# Patient Record
Sex: FEMALE | Race: WHITE | NOT HISPANIC OR LATINO | Employment: OTHER | URBAN - NONMETROPOLITAN AREA
[De-identification: names, ages, dates, MRNs, and addresses within clinical notes are randomized per-mention and may not be internally consistent; named-entity substitution may affect disease eponyms.]

---

## 2021-11-02 ENCOUNTER — APPOINTMENT (OUTPATIENT)
Dept: CT IMAGING | Facility: HOSPITAL | Age: 81
End: 2021-11-02

## 2021-11-02 ENCOUNTER — APPOINTMENT (OUTPATIENT)
Dept: GENERAL RADIOLOGY | Facility: HOSPITAL | Age: 81
End: 2021-11-02

## 2021-11-02 ENCOUNTER — HOSPITAL ENCOUNTER (INPATIENT)
Facility: HOSPITAL | Age: 81
LOS: 5 days | Discharge: HOME HEALTH | End: 2021-11-07
Attending: EMERGENCY MEDICINE | Admitting: INTERNAL MEDICINE

## 2021-11-02 DIAGNOSIS — Z74.09 IMPAIRED MOBILITY AND ADLS: ICD-10-CM

## 2021-11-02 DIAGNOSIS — Z74.09 IMPAIRED FUNCTIONAL MOBILITY, BALANCE, GAIT, AND ENDURANCE: ICD-10-CM

## 2021-11-02 DIAGNOSIS — Z78.9 IMPAIRED MOBILITY AND ADLS: ICD-10-CM

## 2021-11-02 DIAGNOSIS — S72.002A DISPLACED FRACTURE OF LEFT FEMORAL NECK (HCC): Primary | ICD-10-CM

## 2021-11-02 DIAGNOSIS — S72.042D CLOSED DISPLACED BASICERVICAL FRACTURE OF LEFT FEMUR WITH ROUTINE HEALING, SUBSEQUENT ENCOUNTER: ICD-10-CM

## 2021-11-02 DIAGNOSIS — S72.042A CLOSED DISPLACED BASICERVICAL FRACTURE OF LEFT FEMUR, INITIAL ENCOUNTER (HCC): ICD-10-CM

## 2021-11-02 PROBLEM — I10 PRIMARY HYPERTENSION: Status: ACTIVE | Noted: 2021-11-02

## 2021-11-02 LAB
ABO GROUP BLD: NORMAL
ALBUMIN SERPL-MCNC: 4.3 G/DL (ref 3.5–5.2)
ALBUMIN/GLOB SERPL: 1.3 G/DL
ALP SERPL-CCNC: 40 U/L (ref 39–117)
ALT SERPL W P-5'-P-CCNC: 17 U/L (ref 1–33)
ANION GAP SERPL CALCULATED.3IONS-SCNC: 10 MMOL/L (ref 5–15)
AST SERPL-CCNC: 35 U/L (ref 1–32)
BASOPHILS # BLD AUTO: 0.02 10*3/MM3 (ref 0–0.2)
BASOPHILS NFR BLD AUTO: 0.2 % (ref 0–1.5)
BILIRUB SERPL-MCNC: 0.6 MG/DL (ref 0–1.2)
BLD GP AB SCN SERPL QL: NEGATIVE
BUN SERPL-MCNC: 13 MG/DL (ref 8–23)
BUN/CREAT SERPL: 16.7 (ref 7–25)
CALCIUM SPEC-SCNC: 9.4 MG/DL (ref 8.6–10.5)
CHLORIDE SERPL-SCNC: 103 MMOL/L (ref 98–107)
CO2 SERPL-SCNC: 25 MMOL/L (ref 22–29)
CREAT SERPL-MCNC: 0.78 MG/DL (ref 0.57–1)
DEPRECATED RDW RBC AUTO: 43.8 FL (ref 37–54)
EOSINOPHIL # BLD AUTO: 0.02 10*3/MM3 (ref 0–0.4)
EOSINOPHIL NFR BLD AUTO: 0.2 % (ref 0.3–6.2)
ERYTHROCYTE [DISTWIDTH] IN BLOOD BY AUTOMATED COUNT: 13.3 % (ref 12.3–15.4)
FLUAV SUBTYP SPEC NAA+PROBE: NOT DETECTED
FLUBV RNA ISLT QL NAA+PROBE: NOT DETECTED
GFR SERPL CREATININE-BSD FRML MDRD: 71 ML/MIN/1.73
GLOBULIN UR ELPH-MCNC: 3.2 GM/DL
GLUCOSE SERPL-MCNC: 122 MG/DL (ref 65–99)
HCT VFR BLD AUTO: 39.1 % (ref 34–46.6)
HGB BLD-MCNC: 13 G/DL (ref 12–15.9)
HOLD SPECIMEN: NORMAL
IMM GRANULOCYTES # BLD AUTO: 0.06 10*3/MM3 (ref 0–0.05)
IMM GRANULOCYTES NFR BLD AUTO: 0.7 % (ref 0–0.5)
LYMPHOCYTES # BLD AUTO: 0.89 10*3/MM3 (ref 0.7–3.1)
LYMPHOCYTES NFR BLD AUTO: 10.2 % (ref 19.6–45.3)
Lab: NORMAL
MCH RBC QN AUTO: 29.9 PG (ref 26.6–33)
MCHC RBC AUTO-ENTMCNC: 33.2 G/DL (ref 31.5–35.7)
MCV RBC AUTO: 89.9 FL (ref 79–97)
MONOCYTES # BLD AUTO: 0.41 10*3/MM3 (ref 0.1–0.9)
MONOCYTES NFR BLD AUTO: 4.7 % (ref 5–12)
NEUTROPHILS NFR BLD AUTO: 7.31 10*3/MM3 (ref 1.7–7)
NEUTROPHILS NFR BLD AUTO: 84 % (ref 42.7–76)
NRBC BLD AUTO-RTO: 0 /100 WBC (ref 0–0.2)
PLATELET # BLD AUTO: 158 10*3/MM3 (ref 140–450)
PMV BLD AUTO: 9.6 FL (ref 6–12)
POTASSIUM SERPL-SCNC: 3.5 MMOL/L (ref 3.5–5.2)
PROT SERPL-MCNC: 7.5 G/DL (ref 6–8.5)
QT INTERVAL: 410 MS
QTC INTERVAL: 526 MS
RBC # BLD AUTO: 4.35 10*6/MM3 (ref 3.77–5.28)
RH BLD: POSITIVE
SARS-COV-2 RNA PNL SPEC NAA+PROBE: NOT DETECTED
SODIUM SERPL-SCNC: 138 MMOL/L (ref 136–145)
T&S EXPIRATION DATE: NORMAL
WBC # BLD AUTO: 8.71 10*3/MM3 (ref 3.4–10.8)
WHOLE BLOOD HOLD SPECIMEN: NORMAL

## 2021-11-02 PROCEDURE — 25010000002 MORPHINE PER 10 MG: Performed by: NURSE PRACTITIONER

## 2021-11-02 PROCEDURE — G0378 HOSPITAL OBSERVATION PER HR: HCPCS

## 2021-11-02 PROCEDURE — 73502 X-RAY EXAM HIP UNI 2-3 VIEWS: CPT

## 2021-11-02 PROCEDURE — 86900 BLOOD TYPING SEROLOGIC ABO: CPT | Performed by: ORTHOPAEDIC SURGERY

## 2021-11-02 PROCEDURE — 51702 INSERT TEMP BLADDER CATH: CPT

## 2021-11-02 PROCEDURE — 86901 BLOOD TYPING SEROLOGIC RH(D): CPT | Performed by: ORTHOPAEDIC SURGERY

## 2021-11-02 PROCEDURE — 85025 COMPLETE CBC W/AUTO DIFF WBC: CPT | Performed by: PHYSICIAN ASSISTANT

## 2021-11-02 PROCEDURE — 71045 X-RAY EXAM CHEST 1 VIEW: CPT

## 2021-11-02 PROCEDURE — 93005 ELECTROCARDIOGRAM TRACING: CPT | Performed by: PHYSICIAN ASSISTANT

## 2021-11-02 PROCEDURE — 86850 RBC ANTIBODY SCREEN: CPT | Performed by: ORTHOPAEDIC SURGERY

## 2021-11-02 PROCEDURE — 87636 SARSCOV2 & INF A&B AMP PRB: CPT | Performed by: PHYSICIAN ASSISTANT

## 2021-11-02 PROCEDURE — 99284 EMERGENCY DEPT VISIT MOD MDM: CPT

## 2021-11-02 PROCEDURE — 86900 BLOOD TYPING SEROLOGIC ABO: CPT

## 2021-11-02 PROCEDURE — 93010 ELECTROCARDIOGRAM REPORT: CPT | Performed by: INTERNAL MEDICINE

## 2021-11-02 PROCEDURE — 80053 COMPREHEN METABOLIC PANEL: CPT | Performed by: PHYSICIAN ASSISTANT

## 2021-11-02 PROCEDURE — 73700 CT LOWER EXTREMITY W/O DYE: CPT

## 2021-11-02 PROCEDURE — 86901 BLOOD TYPING SEROLOGIC RH(D): CPT

## 2021-11-02 RX ORDER — SODIUM CHLORIDE 0.9 % (FLUSH) 0.9 %
10 SYRINGE (ML) INJECTION AS NEEDED
Status: DISCONTINUED | OUTPATIENT
Start: 2021-11-02 | End: 2021-11-07 | Stop reason: HOSPADM

## 2021-11-02 RX ORDER — SIMVASTATIN 20 MG
20 TABLET ORAL NIGHTLY
COMMUNITY
End: 2022-11-21 | Stop reason: SDUPTHER

## 2021-11-02 RX ORDER — ACETAMINOPHEN 325 MG/1
650 TABLET ORAL EVERY 4 HOURS PRN
Status: DISCONTINUED | OUTPATIENT
Start: 2021-11-02 | End: 2021-11-07 | Stop reason: HOSPADM

## 2021-11-02 RX ORDER — BISACODYL 5 MG/1
5 TABLET, DELAYED RELEASE ORAL DAILY PRN
Status: DISCONTINUED | OUTPATIENT
Start: 2021-11-02 | End: 2021-11-07 | Stop reason: HOSPADM

## 2021-11-02 RX ORDER — NALOXONE HCL 0.4 MG/ML
0.4 VIAL (ML) INJECTION
Status: DISCONTINUED | OUTPATIENT
Start: 2021-11-02 | End: 2021-11-03

## 2021-11-02 RX ORDER — CARVEDILOL 3.12 MG/1
3.12 TABLET ORAL 2 TIMES DAILY WITH MEALS
COMMUNITY
End: 2022-05-03 | Stop reason: SDUPTHER

## 2021-11-02 RX ORDER — SODIUM CHLORIDE 0.9 % (FLUSH) 0.9 %
10 SYRINGE (ML) INJECTION EVERY 12 HOURS SCHEDULED
Status: DISCONTINUED | OUTPATIENT
Start: 2021-11-02 | End: 2021-11-07 | Stop reason: HOSPADM

## 2021-11-02 RX ORDER — AMOXICILLIN 250 MG
2 CAPSULE ORAL 2 TIMES DAILY
Status: DISCONTINUED | OUTPATIENT
Start: 2021-11-02 | End: 2021-11-07 | Stop reason: HOSPADM

## 2021-11-02 RX ORDER — CARVEDILOL 3.12 MG/1
3.12 TABLET ORAL 2 TIMES DAILY WITH MEALS
Status: DISCONTINUED | OUTPATIENT
Start: 2021-11-02 | End: 2021-11-07 | Stop reason: HOSPADM

## 2021-11-02 RX ORDER — ONDANSETRON 2 MG/ML
4 INJECTION INTRAMUSCULAR; INTRAVENOUS EVERY 6 HOURS PRN
Status: DISCONTINUED | OUTPATIENT
Start: 2021-11-02 | End: 2021-11-07 | Stop reason: HOSPADM

## 2021-11-02 RX ORDER — FUROSEMIDE 20 MG/1
20 TABLET ORAL DAILY
Status: DISCONTINUED | OUTPATIENT
Start: 2021-11-03 | End: 2021-11-04

## 2021-11-02 RX ORDER — ATORVASTATIN CALCIUM 10 MG/1
10 TABLET, FILM COATED ORAL DAILY
Status: DISCONTINUED | OUTPATIENT
Start: 2021-11-02 | End: 2021-11-07 | Stop reason: HOSPADM

## 2021-11-02 RX ORDER — BISACODYL 10 MG
10 SUPPOSITORY, RECTAL RECTAL DAILY PRN
Status: DISCONTINUED | OUTPATIENT
Start: 2021-11-02 | End: 2021-11-07 | Stop reason: HOSPADM

## 2021-11-02 RX ORDER — POTASSIUM CHLORIDE 750 MG/1
20 CAPSULE, EXTENDED RELEASE ORAL DAILY
COMMUNITY
End: 2022-07-18 | Stop reason: SDUPTHER

## 2021-11-02 RX ORDER — ONDANSETRON 4 MG/1
4 TABLET, FILM COATED ORAL EVERY 6 HOURS PRN
Status: DISCONTINUED | OUTPATIENT
Start: 2021-11-02 | End: 2021-11-07 | Stop reason: HOSPADM

## 2021-11-02 RX ORDER — POTASSIUM CHLORIDE 750 MG/1
20 CAPSULE, EXTENDED RELEASE ORAL DAILY
Status: DISCONTINUED | OUTPATIENT
Start: 2021-11-03 | End: 2021-11-07 | Stop reason: HOSPADM

## 2021-11-02 RX ORDER — FUROSEMIDE 20 MG/1
1 TABLET ORAL EVERY MORNING
COMMUNITY
Start: 2021-07-19 | End: 2022-07-18 | Stop reason: SDUPTHER

## 2021-11-02 RX ORDER — POLYETHYLENE GLYCOL 3350 17 G/17G
17 POWDER, FOR SOLUTION ORAL DAILY PRN
Status: DISCONTINUED | OUTPATIENT
Start: 2021-11-02 | End: 2021-11-07 | Stop reason: HOSPADM

## 2021-11-02 RX ORDER — MORPHINE SULFATE 2 MG/ML
2 INJECTION, SOLUTION INTRAMUSCULAR; INTRAVENOUS
Status: DISCONTINUED | OUTPATIENT
Start: 2021-11-02 | End: 2021-11-03

## 2021-11-02 RX ORDER — HYDROCODONE BITARTRATE AND ACETAMINOPHEN 7.5; 325 MG/1; MG/1
1 TABLET ORAL EVERY 4 HOURS PRN
Status: DISCONTINUED | OUTPATIENT
Start: 2021-11-02 | End: 2021-11-03

## 2021-11-02 RX ADMIN — ATORVASTATIN CALCIUM 10 MG: 10 TABLET, FILM COATED ORAL at 17:13

## 2021-11-02 RX ADMIN — CARVEDILOL 3.12 MG: 3.12 TABLET, FILM COATED ORAL at 17:13

## 2021-11-02 RX ADMIN — MORPHINE SULFATE 2 MG: 2 INJECTION, SOLUTION INTRAMUSCULAR; INTRAVENOUS at 17:13

## 2021-11-02 RX ADMIN — SODIUM CHLORIDE, PRESERVATIVE FREE 10 ML: 5 INJECTION INTRAVENOUS at 21:21

## 2021-11-02 RX ADMIN — HYDROCODONE BITARTRATE AND ACETAMINOPHEN 1 TABLET: 7.5; 325 TABLET ORAL at 18:16

## 2021-11-02 NOTE — PLAN OF CARE
Goal Outcome Evaluation:  Plan of Care Reviewed With: patient        Progress: no change admitted to floor

## 2021-11-02 NOTE — ED PROVIDER NOTES
"Subjective   Patient presents to emergency department for left hip pain starting this morning after she fell over the side of the couch while trying to move it.  She denies any head trauma, syncope, confusion, weakness.  States pain is alleviated by holding her left leg in knee flexion, aggravated by any movement.  She is unablel to bear any weight.        History provided by:  Patient   used: No        Review of Systems   Constitutional: Negative for chills and fever.   HENT: Negative for sore throat and trouble swallowing.    Eyes: Negative for visual disturbance.   Respiratory: Negative for cough, shortness of breath and wheezing.    Cardiovascular: Negative for chest pain.   Gastrointestinal: Negative for abdominal pain, nausea and vomiting.   Musculoskeletal: Positive for arthralgias. Negative for back pain.   Skin: Negative for color change.   Allergic/Immunologic: Negative for immunocompromised state.   Neurological: Negative for syncope and weakness.       Past Medical History:   Diagnosis Date   • CHF (congestive heart failure) (HCC)    • HLD (hyperlipidemia)    • Hypertension        No Known Allergies    History reviewed. No pertinent surgical history.    Family History   Problem Relation Age of Onset   • Heart disease Mother    • Cancer Mother    • Heart disease Father    • Cancer Father        Social History     Socioeconomic History   • Marital status:    Tobacco Use   • Smoking status: Never Smoker   • Smokeless tobacco: Never Used   Vaping Use   • Vaping Use: Never used   Substance and Sexual Activity   • Alcohol use: Never   • Drug use: Never   • Sexual activity: Defer           Objective      /67 (BP Location: Right arm, Patient Position: Lying)   Pulse 70   Temp 97 °F (36.1 °C)   Resp 18   Ht 162.6 cm (64\")   Wt 69.9 kg (154 lb)   SpO2 99%   BMI 26.43 kg/m²     Physical Exam  Vitals and nursing note reviewed.   Constitutional:       Appearance: Normal " appearance.   HENT:      Head: Normocephalic.   Eyes:      Conjunctiva/sclera: Conjunctivae normal.   Cardiovascular:      Rate and Rhythm: Normal rate and regular rhythm.      Pulses: Normal pulses.      Heart sounds: Normal heart sounds.   Pulmonary:      Effort: Pulmonary effort is normal.      Breath sounds: Normal breath sounds.   Musculoskeletal:         General: Tenderness (left anterior/lateral hip) present. No deformity.      Comments: ROM limited due to pain   Skin:     General: Skin is warm.   Neurological:      Mental Status: She is alert. Mental status is at baseline.   Psychiatric:         Mood and Affect: Mood normal.         Behavior: Behavior normal.         Thought Content: Thought content normal.         Procedures           ED Course  ED Course as of 11/03/21 1823 Tue Nov 02, 2021   1411 Paged Dr De Luna.   [JOAQUIN]   1431 Paged Hospitalist. [JOAQUIN]      ED Course User Index  [JOAQUIN] Varun Scales PA-C      Results for orders placed or performed during the hospital encounter of 11/02/21   COVID-19 and FLU A/B PCR - Swab, Nasopharynx    Specimen: Nasopharynx; Swab   Result Value Ref Range    COVID19 Not Detected Not Detected - Ref. Range    Influenza A PCR Not Detected Not Detected    Influenza B PCR Not Detected Not Detected   Comprehensive Metabolic Panel    Specimen: Blood   Result Value Ref Range    Glucose 122 (H) 65 - 99 mg/dL    BUN 13 8 - 23 mg/dL    Creatinine 0.78 0.57 - 1.00 mg/dL    Sodium 138 136 - 145 mmol/L    Potassium 3.5 3.5 - 5.2 mmol/L    Chloride 103 98 - 107 mmol/L    CO2 25.0 22.0 - 29.0 mmol/L    Calcium 9.4 8.6 - 10.5 mg/dL    Total Protein 7.5 6.0 - 8.5 g/dL    Albumin 4.30 3.50 - 5.20 g/dL    ALT (SGPT) 17 1 - 33 U/L    AST (SGOT) 35 (H) 1 - 32 U/L    Alkaline Phosphatase 40 39 - 117 U/L    Total Bilirubin 0.6 0.0 - 1.2 mg/dL    eGFR Non African Amer 71 >60 mL/min/1.73    Globulin 3.2 gm/dL    A/G Ratio 1.3 g/dL    BUN/Creatinine Ratio 16.7 7.0 - 25.0    Anion Gap 10.0  5.0 - 15.0 mmol/L   CBC Auto Differential    Specimen: Blood   Result Value Ref Range    WBC 8.71 3.40 - 10.80 10*3/mm3    RBC 4.35 3.77 - 5.28 10*6/mm3    Hemoglobin 13.0 12.0 - 15.9 g/dL    Hematocrit 39.1 34.0 - 46.6 %    MCV 89.9 79.0 - 97.0 fL    MCH 29.9 26.6 - 33.0 pg    MCHC 33.2 31.5 - 35.7 g/dL    RDW 13.3 12.3 - 15.4 %    RDW-SD 43.8 37.0 - 54.0 fl    MPV 9.6 6.0 - 12.0 fL    Platelets 158 140 - 450 10*3/mm3    Neutrophil % 84.0 (H) 42.7 - 76.0 %    Lymphocyte % 10.2 (L) 19.6 - 45.3 %    Monocyte % 4.7 (L) 5.0 - 12.0 %    Eosinophil % 0.2 (L) 0.3 - 6.2 %    Basophil % 0.2 0.0 - 1.5 %    Immature Grans % 0.7 (H) 0.0 - 0.5 %    Neutrophils, Absolute 7.31 (H) 1.70 - 7.00 10*3/mm3    Lymphocytes, Absolute 0.89 0.70 - 3.10 10*3/mm3    Monocytes, Absolute 0.41 0.10 - 0.90 10*3/mm3    Eosinophils, Absolute 0.02 0.00 - 0.40 10*3/mm3    Basophils, Absolute 0.02 0.00 - 0.20 10*3/mm3    Immature Grans, Absolute 0.06 (H) 0.00 - 0.05 10*3/mm3    nRBC 0.0 0.0 - 0.2 /100 WBC   Comprehensive Metabolic Panel    Specimen: Blood   Result Value Ref Range    Glucose 118 (H) 65 - 99 mg/dL    BUN 11 8 - 23 mg/dL    Creatinine 0.59 0.57 - 1.00 mg/dL    Sodium 133 (L) 136 - 145 mmol/L    Potassium 3.8 3.5 - 5.2 mmol/L    Chloride 100 98 - 107 mmol/L    CO2 24.0 22.0 - 29.0 mmol/L    Calcium 8.9 8.6 - 10.5 mg/dL    Total Protein 6.6 6.0 - 8.5 g/dL    Albumin 3.80 3.50 - 5.20 g/dL    ALT (SGPT) 14 1 - 33 U/L    AST (SGOT) 22 1 - 32 U/L    Alkaline Phosphatase 32 (L) 39 - 117 U/L    Total Bilirubin 1.0 0.0 - 1.2 mg/dL    eGFR Non African Amer 98 >60 mL/min/1.73    Globulin 2.8 gm/dL    A/G Ratio 1.4 g/dL    BUN/Creatinine Ratio 18.6 7.0 - 25.0    Anion Gap 9.0 5.0 - 15.0 mmol/L   CBC Auto Differential    Specimen: Blood   Result Value Ref Range    WBC 6.56 3.40 - 10.80 10*3/mm3    RBC 3.72 (L) 3.77 - 5.28 10*6/mm3    Hemoglobin 11.1 (L) 12.0 - 15.9 g/dL    Hematocrit 32.8 (L) 34.0 - 46.6 %    MCV 88.2 79.0 - 97.0 fL    MCH 29.8 26.6  - 33.0 pg    MCHC 33.8 31.5 - 35.7 g/dL    RDW 13.2 12.3 - 15.4 %    RDW-SD 42.7 37.0 - 54.0 fl    MPV 10.0 6.0 - 12.0 fL    Platelets 152 140 - 450 10*3/mm3    Neutrophil % 68.6 42.7 - 76.0 %    Lymphocyte % 20.0 19.6 - 45.3 %    Monocyte % 10.4 5.0 - 12.0 %    Eosinophil % 0.2 (L) 0.3 - 6.2 %    Basophil % 0.3 0.0 - 1.5 %    Immature Grans % 0.5 0.0 - 0.5 %    Neutrophils, Absolute 4.51 1.70 - 7.00 10*3/mm3    Lymphocytes, Absolute 1.31 0.70 - 3.10 10*3/mm3    Monocytes, Absolute 0.68 0.10 - 0.90 10*3/mm3    Eosinophils, Absolute 0.01 0.00 - 0.40 10*3/mm3    Basophils, Absolute 0.02 0.00 - 0.20 10*3/mm3    Immature Grans, Absolute 0.03 0.00 - 0.05 10*3/mm3    nRBC 0.0 0.0 - 0.2 /100 WBC   Urinalysis With Culture If Indicated - Urine, Catheter    Specimen: Urine, Catheter   Result Value Ref Range    Color, UA Yellow Yellow, Straw, Dark Yellow, Nydia    Appearance, UA Cloudy (A) Clear    pH, UA <=5.0 5.0 - 9.0    Specific Gravity, UA 1.027 1.003 - 1.030    Glucose, UA Negative Negative    Ketones, UA Negative Negative    Bilirubin, UA Negative Negative    Blood, UA Negative Negative    Protein, UA Trace (A) Negative    Leuk Esterase, UA Small (1+) (A) Negative    Nitrite, UA Positive (A) Negative    Urobilinogen, UA 0.2 E.U./dL 0.2 - 1.0 E.U./dL   Urinalysis, Microscopic Only - Urine, Catheter    Specimen: Urine, Catheter   Result Value Ref Range    RBC, UA 3-5 (A) None Seen /HPF    WBC, UA 13-20 (A) None Seen, 0-2, 3-5 /HPF    Bacteria, UA 1+ (A) None Seen /HPF    Squamous Epithelial Cells, UA None Seen None Seen, 0-2 /HPF    Hyaline Casts, UA 7-12 None Seen /LPF    Methodology Automated Microscopy    ECG 12 Lead   Result Value Ref Range    QT Interval 410 ms    QTC Interval 526 ms   Type & Screen    Specimen: Blood   Result Value Ref Range    ABO Type O     RH type Positive     Antibody Screen Negative     T&S Expiration Date 11/5/2021 11:59:59 PM    PREVIOUS HISTORY    Specimen: Blood   Result Value Ref Range     Previous History Previous Record on File    Gold Top - SST   Result Value Ref Range    Extra Tube Hold for add-ons.    Light Blue Top   Result Value Ref Range    Extra Tube hold for add-on           XR Chest 1 View    Result Date: 11/2/2021  Narrative: EXAM DESCRIPTION:  XR CHEST 1 VW CLINICAL HISTORY: 81 years  Female  pre op, S72.002A Fracture of unspecified part of neck of left femur, initial encounter for closed fracture COMPARISON: None available TECHNIQUE: One view-AP radiograph the chest FINDINGS: The lungs are well-expanded and clear. The cardiac silhouette and pulmonary vasculature are within normal limits. There are no pleural effusions.     Impression: 1. No radiographic evidence of acute cardiopulmonary disease. Electronically signed by:  Lexus Hernandez MD  11/2/2021 3:00 PM CDT Workstation: [id number]    CT Lower Extremity Left Without Contrast    Result Date: 11/2/2021  Narrative: PROCEDURE: CT LOWER EXTREMITY WITHOUT IV CONTRAST TECHNIQUE: Multichannel computed tomography of the left hip without contrast. Coronal and sagittal reformatted images were also obtained to improve fracture detection. Contrast: None This exam was performed using radiation doses that are as low as reasonably achievable (ALARA). This exam was performed according to our departmental dose optimization program, which includes automated exposure control, adjustment of the mA and/or KV according to patient size and/or use of iterative reconstruction technique. COMPARISON: Left hip radiographs performed the same date. HISTORY: further eval of left hip fracture per ortho, S72.002A Fracture of unspecified part of neck of left femur, initial encounter for closed fracture FINDINGS There is a comminuted left intertrochanteric fracture to the base of the femoral neck. There are degenerative changes of the left hip joint. There is a moderate amount stool in the rectum. There is a left adnexal cystic structure measuring 2.9 x 2.6 cm. Recommend  pelvic ultrasound in 6-12 weeks.     Impression: CONCLUSION: Comminuted left intertrochanteric proximal femoral fracture also involving the base of the femoral neck. Degenerative changes of the left hip. Left adnexal cystic structure measuring 2.9 x 2.6 cm. Recommend pelvic ultrasound in 6-12 weeks. Electronically signed by:  Taye Poon MD  11/2/2021 5:30 PM CDT Workstation: [id number]    XR Hip With or Without Pelvis 2 - 3 View Left    Result Date: 11/3/2021  Narrative: Procedure:  Left hip    Indication:  Postop fracture internal fixation.. Technique:  2 views   . Prior relevant exam: Left hip November 2, 2021.. Left hip fracture without angulation or deformity. Alignment is significantly improved in comparison with prereduction views. Alignment is maintained by a lag screw through  the femoral neck attached to a short intramedullary maryanne through the proximal femoral shaft.  Skin staples are noted in the lateral aspect left hip and proximal femur at the incision sites. There are moderate arthritic changes of the femoral head, left hip.     Impression: As above. Electronically signed by:  Timo Looney MD  11/3/2021 3:14 PM CDT Workstation: [id number]    XR Hip With or Without Pelvis 2 - 3 View Left    Result Date: 11/2/2021  Narrative: AP pelvis single view and left hip two views three views total November 2, 2021 INDICATION: Patient fell today with acute onset pain FINDINGS: Limited study secondary to limitation in patient positioning. Comminuted intertrochanteric fracture may extend into the femoral neck. Multiple butterfly fragments suspected. No definite anterior pelvic fracture. Right hip grossly normal. Degenerative changes lower lumbar region.     Impression: Limited study. Comminuted intertrochanteric fracture on the left may extend into the femoral neck. Clinical correlation recommended. Depending upon the clinical situation, he scan might be considered for more complete evaluation. Electronically  signed by:  Kg Villa MD  11/2/2021 2:21 PM CDT Workstation: [id number]                                    [facility]    Final diagnoses:   Displaced fracture of left femoral neck (HCC)       ED Disposition  ED Disposition     ED Disposition Condition Comment    Decision to Admit  Level of Care: Med/Surg [1]   Diagnosis: Displaced fracture of left femoral neck (HCC) [241483]   Admitting Physician: AMPARO SHORT [[id number]]   Attending Physician: AMPARO SHORT [[id number]]            No follow-up provider specified.       Medication List      No changes were made to your prescriptions during this visit.          Varun Scales PA-C  11/03/21 1829

## 2021-11-03 ENCOUNTER — ANESTHESIA EVENT (OUTPATIENT)
Dept: PERIOP | Facility: HOSPITAL | Age: 81
End: 2021-11-03

## 2021-11-03 ENCOUNTER — APPOINTMENT (OUTPATIENT)
Dept: GENERAL RADIOLOGY | Facility: HOSPITAL | Age: 81
End: 2021-11-03

## 2021-11-03 ENCOUNTER — ANESTHESIA (OUTPATIENT)
Dept: PERIOP | Facility: HOSPITAL | Age: 81
End: 2021-11-03

## 2021-11-03 LAB
ALBUMIN SERPL-MCNC: 3.8 G/DL (ref 3.5–5.2)
ALBUMIN/GLOB SERPL: 1.4 G/DL
ALP SERPL-CCNC: 32 U/L (ref 39–117)
ALT SERPL W P-5'-P-CCNC: 14 U/L (ref 1–33)
ANION GAP SERPL CALCULATED.3IONS-SCNC: 9 MMOL/L (ref 5–15)
AST SERPL-CCNC: 22 U/L (ref 1–32)
BACTERIA UR QL AUTO: ABNORMAL /HPF
BASOPHILS # BLD AUTO: 0.02 10*3/MM3 (ref 0–0.2)
BASOPHILS NFR BLD AUTO: 0.3 % (ref 0–1.5)
BILIRUB SERPL-MCNC: 1 MG/DL (ref 0–1.2)
BILIRUB UR QL STRIP: NEGATIVE
BUN SERPL-MCNC: 11 MG/DL (ref 8–23)
BUN/CREAT SERPL: 18.6 (ref 7–25)
CALCIUM SPEC-SCNC: 8.9 MG/DL (ref 8.6–10.5)
CHLORIDE SERPL-SCNC: 100 MMOL/L (ref 98–107)
CLARITY UR: ABNORMAL
CO2 SERPL-SCNC: 24 MMOL/L (ref 22–29)
COLOR UR: YELLOW
CREAT SERPL-MCNC: 0.59 MG/DL (ref 0.57–1)
DEPRECATED RDW RBC AUTO: 42.7 FL (ref 37–54)
EOSINOPHIL # BLD AUTO: 0.01 10*3/MM3 (ref 0–0.4)
EOSINOPHIL NFR BLD AUTO: 0.2 % (ref 0.3–6.2)
ERYTHROCYTE [DISTWIDTH] IN BLOOD BY AUTOMATED COUNT: 13.2 % (ref 12.3–15.4)
GFR SERPL CREATININE-BSD FRML MDRD: 98 ML/MIN/1.73
GLOBULIN UR ELPH-MCNC: 2.8 GM/DL
GLUCOSE SERPL-MCNC: 118 MG/DL (ref 65–99)
GLUCOSE UR STRIP-MCNC: NEGATIVE MG/DL
HCT VFR BLD AUTO: 32.8 % (ref 34–46.6)
HGB BLD-MCNC: 11.1 G/DL (ref 12–15.9)
HGB UR QL STRIP.AUTO: NEGATIVE
HYALINE CASTS UR QL AUTO: ABNORMAL /LPF
IMM GRANULOCYTES # BLD AUTO: 0.03 10*3/MM3 (ref 0–0.05)
IMM GRANULOCYTES NFR BLD AUTO: 0.5 % (ref 0–0.5)
KETONES UR QL STRIP: NEGATIVE
LEUKOCYTE ESTERASE UR QL STRIP.AUTO: ABNORMAL
LYMPHOCYTES # BLD AUTO: 1.31 10*3/MM3 (ref 0.7–3.1)
LYMPHOCYTES NFR BLD AUTO: 20 % (ref 19.6–45.3)
MCH RBC QN AUTO: 29.8 PG (ref 26.6–33)
MCHC RBC AUTO-ENTMCNC: 33.8 G/DL (ref 31.5–35.7)
MCV RBC AUTO: 88.2 FL (ref 79–97)
MONOCYTES # BLD AUTO: 0.68 10*3/MM3 (ref 0.1–0.9)
MONOCYTES NFR BLD AUTO: 10.4 % (ref 5–12)
NEUTROPHILS NFR BLD AUTO: 4.51 10*3/MM3 (ref 1.7–7)
NEUTROPHILS NFR BLD AUTO: 68.6 % (ref 42.7–76)
NITRITE UR QL STRIP: POSITIVE
NRBC BLD AUTO-RTO: 0 /100 WBC (ref 0–0.2)
PH UR STRIP.AUTO: <=5 [PH] (ref 5–9)
PLATELET # BLD AUTO: 152 10*3/MM3 (ref 140–450)
PMV BLD AUTO: 10 FL (ref 6–12)
POTASSIUM SERPL-SCNC: 3.8 MMOL/L (ref 3.5–5.2)
PROT SERPL-MCNC: 6.6 G/DL (ref 6–8.5)
PROT UR QL STRIP: ABNORMAL
RBC # BLD AUTO: 3.72 10*6/MM3 (ref 3.77–5.28)
RBC # UR: ABNORMAL /HPF
REF LAB TEST METHOD: ABNORMAL
SODIUM SERPL-SCNC: 133 MMOL/L (ref 136–145)
SP GR UR STRIP: 1.03 (ref 1–1.03)
SQUAMOUS #/AREA URNS HPF: ABNORMAL /HPF
UROBILINOGEN UR QL STRIP: ABNORMAL
WBC # BLD AUTO: 6.56 10*3/MM3 (ref 3.4–10.8)
WBC UR QL AUTO: ABNORMAL /HPF

## 2021-11-03 PROCEDURE — 81001 URINALYSIS AUTO W/SCOPE: CPT | Performed by: ORTHOPAEDIC SURGERY

## 2021-11-03 PROCEDURE — 73502 X-RAY EXAM HIP UNI 2-3 VIEWS: CPT

## 2021-11-03 PROCEDURE — 87086 URINE CULTURE/COLONY COUNT: CPT | Performed by: ORTHOPAEDIC SURGERY

## 2021-11-03 PROCEDURE — 99222 1ST HOSP IP/OBS MODERATE 55: CPT | Performed by: ORTHOPAEDIC SURGERY

## 2021-11-03 PROCEDURE — 76000 FLUOROSCOPY <1 HR PHYS/QHP: CPT

## 2021-11-03 PROCEDURE — 80053 COMPREHEN METABOLIC PANEL: CPT | Performed by: NURSE PRACTITIONER

## 2021-11-03 PROCEDURE — 94799 UNLISTED PULMONARY SVC/PX: CPT

## 2021-11-03 PROCEDURE — 25010000002 PROPOFOL 10 MG/ML EMULSION: Performed by: NURSE ANESTHETIST, CERTIFIED REGISTERED

## 2021-11-03 PROCEDURE — 27245 TREAT THIGH FRACTURE: CPT | Performed by: ORTHOPAEDIC SURGERY

## 2021-11-03 PROCEDURE — C1769 GUIDE WIRE: HCPCS | Performed by: ORTHOPAEDIC SURGERY

## 2021-11-03 PROCEDURE — 25010000002 CEFAZOLIN PER 500 MG: Performed by: ORTHOPAEDIC SURGERY

## 2021-11-03 PROCEDURE — 25010000002 MIDAZOLAM PER 1 MG: Performed by: NURSE ANESTHETIST, CERTIFIED REGISTERED

## 2021-11-03 PROCEDURE — 25010000002 VANCOMYCIN 1 G RECONSTITUTED SOLUTION: Performed by: ORTHOPAEDIC SURGERY

## 2021-11-03 PROCEDURE — 97166 OT EVAL MOD COMPLEX 45 MIN: CPT

## 2021-11-03 PROCEDURE — 85025 COMPLETE CBC W/AUTO DIFF WBC: CPT | Performed by: NURSE PRACTITIONER

## 2021-11-03 PROCEDURE — 25010000002 ONDANSETRON PER 1 MG: Performed by: NURSE ANESTHETIST, CERTIFIED REGISTERED

## 2021-11-03 PROCEDURE — 25010000002 PHENYLEPHRINE 10 MG/ML SOLUTION: Performed by: NURSE ANESTHETIST, CERTIFIED REGISTERED

## 2021-11-03 PROCEDURE — 25010000002 PHENYLEPHRINE 10 MG/ML SOLUTION 5 ML VIAL: Performed by: NURSE ANESTHETIST, CERTIFIED REGISTERED

## 2021-11-03 PROCEDURE — 25010000002 FENTANYL CITRATE (PF) 50 MCG/ML SOLUTION: Performed by: NURSE ANESTHETIST, CERTIFIED REGISTERED

## 2021-11-03 PROCEDURE — C1713 ANCHOR/SCREW BN/BN,TIS/BN: HCPCS | Performed by: ORTHOPAEDIC SURGERY

## 2021-11-03 PROCEDURE — 0QS706Z REPOSITION LEFT UPPER FEMUR WITH INTRAMEDULLARY INTERNAL FIXATION DEVICE, OPEN APPROACH: ICD-10-PCS | Performed by: ORTHOPAEDIC SURGERY

## 2021-11-03 DEVICE — SCRW LK STRDRV TI 5X40M STRL: Type: IMPLANTABLE DEVICE | Site: HIP | Status: FUNCTIONAL

## 2021-11-03 DEVICE — NAIL FEM TFN ADV PROX 130D SHT 10X170MM STRL: Type: IMPLANTABLE DEVICE | Site: HIP | Status: FUNCTIONAL

## 2021-11-03 DEVICE — BLD FEM FIX HELI TFN ADV PERF 95MM STRL: Type: IMPLANTABLE DEVICE | Site: HIP | Status: FUNCTIONAL

## 2021-11-03 RX ORDER — HYDROCODONE BITARTRATE AND ACETAMINOPHEN 5; 325 MG/1; MG/1
2 TABLET ORAL EVERY 4 HOURS PRN
Status: DISCONTINUED | OUTPATIENT
Start: 2021-11-03 | End: 2021-11-07 | Stop reason: HOSPADM

## 2021-11-03 RX ORDER — SODIUM CHLORIDE 0.9 % (FLUSH) 0.9 %
3-10 SYRINGE (ML) INJECTION AS NEEDED
Status: DISCONTINUED | OUTPATIENT
Start: 2021-11-03 | End: 2021-11-07 | Stop reason: HOSPADM

## 2021-11-03 RX ORDER — ONDANSETRON 2 MG/ML
4 INJECTION INTRAMUSCULAR; INTRAVENOUS EVERY 6 HOURS PRN
Status: DISCONTINUED | OUTPATIENT
Start: 2021-11-03 | End: 2021-11-03 | Stop reason: SDUPTHER

## 2021-11-03 RX ORDER — SODIUM CHLORIDE, SODIUM GLUCONATE, SODIUM ACETATE, POTASSIUM CHLORIDE AND MAGNESIUM CHLORIDE 526; 502; 368; 37; 30 MG/100ML; MG/100ML; MG/100ML; MG/100ML; MG/100ML
1000 INJECTION, SOLUTION INTRAVENOUS CONTINUOUS PRN
Status: DISCONTINUED | OUTPATIENT
Start: 2021-11-03 | End: 2021-11-03 | Stop reason: HOSPADM

## 2021-11-03 RX ORDER — SODIUM CHLORIDE 9 MG/ML
100 INJECTION, SOLUTION INTRAVENOUS CONTINUOUS
Status: DISCONTINUED | OUTPATIENT
Start: 2021-11-03 | End: 2021-11-05

## 2021-11-03 RX ORDER — MIDAZOLAM HYDROCHLORIDE 1 MG/ML
INJECTION INTRAMUSCULAR; INTRAVENOUS AS NEEDED
Status: DISCONTINUED | OUTPATIENT
Start: 2021-11-03 | End: 2021-11-03 | Stop reason: SURG

## 2021-11-03 RX ORDER — HYDROCODONE BITARTRATE AND ACETAMINOPHEN 5; 325 MG/1; MG/1
1 TABLET ORAL EVERY 4 HOURS PRN
Status: DISCONTINUED | OUTPATIENT
Start: 2021-11-03 | End: 2021-11-07 | Stop reason: HOSPADM

## 2021-11-03 RX ORDER — BUPIVACAINE HCL/0.9 % NACL/PF 0.1 %
2 PLASTIC BAG, INJECTION (ML) EPIDURAL ONCE
Status: COMPLETED | OUTPATIENT
Start: 2021-11-03 | End: 2021-11-03

## 2021-11-03 RX ORDER — SODIUM CHLORIDE 0.9 % (FLUSH) 0.9 %
3 SYRINGE (ML) INJECTION EVERY 12 HOURS SCHEDULED
Status: DISCONTINUED | OUTPATIENT
Start: 2021-11-03 | End: 2021-11-07 | Stop reason: HOSPADM

## 2021-11-03 RX ORDER — TRANEXAMIC ACID 650 MG/1
1300 TABLET ORAL EVERY 4 HOURS
Status: COMPLETED | OUTPATIENT
Start: 2021-11-03 | End: 2021-11-03

## 2021-11-03 RX ORDER — TRANEXAMIC ACID 650 MG/1
1300 TABLET ORAL ONCE
Status: COMPLETED | OUTPATIENT
Start: 2021-11-03 | End: 2021-11-03

## 2021-11-03 RX ORDER — BUPIVACAINE HYDROCHLORIDE 2.5 MG/ML
INJECTION, SOLUTION EPIDURAL; INFILTRATION; INTRACAUDAL AS NEEDED
Status: DISCONTINUED | OUTPATIENT
Start: 2021-11-03 | End: 2021-11-03 | Stop reason: HOSPADM

## 2021-11-03 RX ORDER — ONDANSETRON 4 MG/1
4 TABLET, FILM COATED ORAL EVERY 6 HOURS PRN
Status: DISCONTINUED | OUTPATIENT
Start: 2021-11-03 | End: 2021-11-03 | Stop reason: SDUPTHER

## 2021-11-03 RX ORDER — PROPOFOL 10 MG/ML
VIAL (ML) INTRAVENOUS AS NEEDED
Status: DISCONTINUED | OUTPATIENT
Start: 2021-11-03 | End: 2021-11-03 | Stop reason: SURG

## 2021-11-03 RX ORDER — PHENYLEPHRINE HYDROCHLORIDE 10 MG/ML
INJECTION INTRAVENOUS AS NEEDED
Status: DISCONTINUED | OUTPATIENT
Start: 2021-11-03 | End: 2021-11-03 | Stop reason: SURG

## 2021-11-03 RX ORDER — DOCUSATE SODIUM 100 MG/1
100 CAPSULE, LIQUID FILLED ORAL 2 TIMES DAILY
Status: DISCONTINUED | OUTPATIENT
Start: 2021-11-03 | End: 2021-11-07 | Stop reason: HOSPADM

## 2021-11-03 RX ORDER — ONDANSETRON 2 MG/ML
4 INJECTION INTRAMUSCULAR; INTRAVENOUS ONCE AS NEEDED
Status: DISCONTINUED | OUTPATIENT
Start: 2021-11-03 | End: 2021-11-03 | Stop reason: HOSPADM

## 2021-11-03 RX ORDER — NALOXONE HCL 0.4 MG/ML
0.4 VIAL (ML) INJECTION
Status: DISCONTINUED | OUTPATIENT
Start: 2021-11-03 | End: 2021-11-07 | Stop reason: HOSPADM

## 2021-11-03 RX ORDER — TRANEXAMIC ACID 100 MG/ML
INJECTION, SOLUTION INTRAVENOUS AS NEEDED
Status: DISCONTINUED | OUTPATIENT
Start: 2021-11-03 | End: 2021-11-03 | Stop reason: HOSPADM

## 2021-11-03 RX ORDER — FENTANYL CITRATE 50 UG/ML
INJECTION, SOLUTION INTRAMUSCULAR; INTRAVENOUS AS NEEDED
Status: DISCONTINUED | OUTPATIENT
Start: 2021-11-03 | End: 2021-11-03 | Stop reason: SURG

## 2021-11-03 RX ORDER — BUPIVACAINE HCL/0.9 % NACL/PF 0.1 %
2 PLASTIC BAG, INJECTION (ML) EPIDURAL EVERY 8 HOURS
Status: COMPLETED | OUTPATIENT
Start: 2021-11-03 | End: 2021-11-04

## 2021-11-03 RX ORDER — MORPHINE SULFATE 2 MG/ML
2 INJECTION, SOLUTION INTRAMUSCULAR; INTRAVENOUS
Status: DISCONTINUED | OUTPATIENT
Start: 2021-11-03 | End: 2021-11-07 | Stop reason: HOSPADM

## 2021-11-03 RX ORDER — VANCOMYCIN HYDROCHLORIDE 1 G/20ML
INJECTION, POWDER, LYOPHILIZED, FOR SOLUTION INTRAVENOUS AS NEEDED
Status: DISCONTINUED | OUTPATIENT
Start: 2021-11-03 | End: 2021-11-03 | Stop reason: HOSPADM

## 2021-11-03 RX ORDER — ONDANSETRON 2 MG/ML
INJECTION INTRAMUSCULAR; INTRAVENOUS AS NEEDED
Status: DISCONTINUED | OUTPATIENT
Start: 2021-11-03 | End: 2021-11-03 | Stop reason: SURG

## 2021-11-03 RX ADMIN — DOCUSATE SODIUM 50 MG AND SENNOSIDES 8.6 MG 2 TABLET: 8.6; 5 TABLET, FILM COATED ORAL at 21:12

## 2021-11-03 RX ADMIN — PHENYLEPHRINE HYDROCHLORIDE 200 MCG: 10 INJECTION INTRAVENOUS at 13:01

## 2021-11-03 RX ADMIN — PHENYLEPHRINE HYDROCHLORIDE 0.1 MCG/KG/MIN: 10 INJECTION INTRAVENOUS at 13:33

## 2021-11-03 RX ADMIN — PHENYLEPHRINE HYDROCHLORIDE 100 MCG: 10 INJECTION INTRAVENOUS at 13:22

## 2021-11-03 RX ADMIN — FENTANYL CITRATE 25 MCG: 50 INJECTION INTRAMUSCULAR; INTRAVENOUS at 12:38

## 2021-11-03 RX ADMIN — HYDROCODONE BITARTRATE AND ACETAMINOPHEN 1 TABLET: 7.5; 325 TABLET ORAL at 00:02

## 2021-11-03 RX ADMIN — PROPOFOL 50 MG: 10 INJECTION, EMULSION INTRAVENOUS at 12:52

## 2021-11-03 RX ADMIN — PHENYLEPHRINE HYDROCHLORIDE 100 MCG: 10 INJECTION INTRAVENOUS at 13:05

## 2021-11-03 RX ADMIN — DOCUSATE SODIUM 100 MG: 100 CAPSULE, LIQUID FILLED ORAL at 21:12

## 2021-11-03 RX ADMIN — PHENYLEPHRINE HYDROCHLORIDE 100 MCG: 10 INJECTION INTRAVENOUS at 13:16

## 2021-11-03 RX ADMIN — ONDANSETRON 4 MG: 2 INJECTION INTRAMUSCULAR; INTRAVENOUS at 13:57

## 2021-11-03 RX ADMIN — PROPOFOL 100 MG: 10 INJECTION, EMULSION INTRAVENOUS at 12:45

## 2021-11-03 RX ADMIN — CEFAZOLIN SODIUM 2 G: 10 INJECTION, POWDER, FOR SOLUTION INTRAVENOUS at 21:11

## 2021-11-03 RX ADMIN — TRANEXAMIC ACID 1300 MG: 650 TABLET ORAL at 18:34

## 2021-11-03 RX ADMIN — MIDAZOLAM HYDROCHLORIDE 0.5 MG: 1 INJECTION, SOLUTION INTRAMUSCULAR; INTRAVENOUS at 12:38

## 2021-11-03 RX ADMIN — PHENYLEPHRINE HYDROCHLORIDE 100 MCG: 10 INJECTION INTRAVENOUS at 13:12

## 2021-11-03 RX ADMIN — Medication 2 G: at 12:43

## 2021-11-03 RX ADMIN — SODIUM CHLORIDE, SODIUM GLUCONATE, SODIUM ACETATE, POTASSIUM CHLORIDE AND MAGNESIUM CHLORIDE 1000 ML: 526; 502; 368; 37; 30 INJECTION, SOLUTION INTRAVENOUS at 11:13

## 2021-11-03 RX ADMIN — TRANEXAMIC ACID 1300 MG: 650 TABLET ORAL at 21:12

## 2021-11-03 RX ADMIN — HYDROCODONE BITARTRATE AND ACETAMINOPHEN 2 TABLET: 5; 325 TABLET ORAL at 16:38

## 2021-11-03 RX ADMIN — CARVEDILOL 3.12 MG: 3.12 TABLET, FILM COATED ORAL at 08:43

## 2021-11-03 RX ADMIN — SODIUM CHLORIDE, PRESERVATIVE FREE 10 ML: 5 INJECTION INTRAVENOUS at 08:46

## 2021-11-03 RX ADMIN — SODIUM CHLORIDE 100 ML/HR: 9 INJECTION, SOLUTION INTRAVENOUS at 16:03

## 2021-11-03 RX ADMIN — TRANEXAMIC ACID 1300 MG: 650 TABLET ORAL at 11:53

## 2021-11-03 RX ADMIN — CARVEDILOL 3.12 MG: 3.12 TABLET, FILM COATED ORAL at 18:34

## 2021-11-03 RX ADMIN — FENTANYL CITRATE 25 MCG: 50 INJECTION INTRAMUSCULAR; INTRAVENOUS at 13:27

## 2021-11-03 RX ADMIN — PROPOFOL 50 MG: 10 INJECTION, EMULSION INTRAVENOUS at 13:28

## 2021-11-03 RX ADMIN — SODIUM CHLORIDE, SODIUM GLUCONATE, SODIUM ACETATE, POTASSIUM CHLORIDE AND MAGNESIUM CHLORIDE: 526; 502; 368; 37; 30 INJECTION, SOLUTION INTRAVENOUS at 13:47

## 2021-11-03 NOTE — OP NOTE
Procedure(s):  HIP TROCHANTERIC NAILING SHORT WITH INTRAMEDULLARY HIP SCREW  Procedure Note    Corina Lenz  11/3/2021    Pre-op Diagnosis: Displaced cervical trochanteric fracture left femur  Closed displaced basicervical fracture of left femur, initial encounter ([facility]) [S72.042A]    Post-op Diagnosis:   Same  Post-Op Diagnosis Codes:     * Closed displaced basicervical fracture of left femur, initial encounter ([facility]) [S72.042A]    Procedure/CPT® Codes: Closed reduction left hip with placement of trochanteric fixation nail (CPT code 86934).      Procedure(s):  HIP TROCHANTERIC NAILING SHORT WITH INTRAMEDULLARY HIP SCREW    Surgeon(s):  Leobardo De Luna MD    Anesthesia: Choice    Staff:   Circulator: Lexus Hemphill RN  Scrub Person: Javed Stout  Assistant: Anahi Garcia MA    Assistant: Anahi Garcia MA was responsible for performing the following activities: Suction, Irrigation and Placing Dressing and their skilled assistance was necessary for the success of this case.     Estimated Blood Loss: Less than 50 cc    Specimens:       None               Drains: None  Urethral Catheter Silicone 16 Fr. (Active)   Daily Indications Required activity restriction from trauma, surgery, (e.g. unstable spine, fracture, hemodynamics) 11/03/21 0744   Site Assessment Clean 11/03/21 0747   Collection Container Standard drainage bag 11/03/21 1111   Securement Method Leg strap 11/03/21 0149   Catheter care complete Yes 11/03/21 0744   Output (mL) 500 mL 11/03/21 0900       Findings: Displaced cervical trochanteric fracture left femur    Complications: None    INDICATIONS : The patient is a 81-year-old female who sustained the above injury.  Treatment options were reviewed and I recommended the above procedure.  Anticipated benefits of surgery as well as potential complications of surgery and anesthetic were reviewed in detail and she appeared to understand all matters discussed and wished to proceed.  The  timeout procedure was performed prior to entry to the operating room suite.    Operative Report: Patient was brought to the operating room.  The timeout procedure was followed.  General anesthesia was induced.  She was placed on the fracture table in the supine position.  Fracture was manipulated under fluoroscopic control and satisfactory fracture alignment was confirmed.    The left hindquarter was prepped and draped.  A longitudinal incision was made in the buttock.  Subcutaneous tissues and the fascia olayinka were incised and bleeding points cauterized.  Under fluoroscopic control a guidewire was advanced into the proximal femur.  After the appropriate reaming an intramedullary nail was advanced to the femoral canal.  I chose as the implant for fixation a trochanteric fixation nail manufactured by the [employer] with 10 mm in diameter and 130 degree neck shaft angle.  After the appropriate reaming a spiral blade was placed proximally.  This was 95 mm in length.  Rotation of the nail was locked.  Distal locking was performed using a single 40 mm screw.  Final position of the fracture and hardware were satisfactory C arm in multiple planes.    The wounds were irrigated.  The deep fascia was closed with Vicryl.  Subcutaneous tissues were closed in layers using Vicryl.  Skin closure was with staples.  The wounds were infiltrated with Marcaine.  A mixture of tranexamic acid and saline was then injected deep to the fascia.  This injection also included 1 g of vancomycin and 5 cc of Marcaine.  Soft dressing was applied.    The patient was transported to the recovery room in stable condition.  There were no intraoperative or immediate postoperative complications.  Estimated blood loss less than 50 cc.    Leobardo De Luna MD  11/03/21  14:16 CDT

## 2021-11-03 NOTE — THERAPY EVALUATION
Patient Name: Corina Lenz  : 1940    MRN: [id number]                              Today's Date: 11/3/2021       Admit Date: 2021    Visit Dx:     ICD-10-CM ICD-9-CM   1. Displaced fracture of left femoral neck (HCC)  S72.002A 820.8   2. Closed displaced basicervical fracture of left femur, initial encounter ([facility])  S72.042A 820.03   3. Impaired mobility and ADLs  Z74.09 V49.89    Z78.9      Patient Active Problem List   Diagnosis   • Displaced fracture of left femoral neck (HCC)   • Primary hypertension   • Closed displaced fracture of base of neck of left femur (HCC)     Past Medical History:   Diagnosis Date   • CHF (congestive heart failure) (HCC)    • HLD (hyperlipidemia)    • Hypertension      History reviewed. No pertinent surgical history.   General Information     [facility] Name 21 155          OT Time and Intention    Document Type evaluation  -     Mode of Treatment individual therapy; occupational therapy  -[facility] Name 21 155          General Information    Patient Profile Reviewed yes  -     Prior Level of Function independent:; all household mobility; community mobility; gait; transfer; bed mobility; ADL's; feeding; grooming; dressing; bathing; shopping; cleaning; cooking; home management  family assists with driving.  -     Existing Precautions/Restrictions fall; weight bearing  -     Barriers to Rehab none identified  -[facility] Name 21          Living Environment    Lives With other (see comments)  Niece and Nephew  -[facility] Name 21 230          Stairs Within Home, Primary    Stairs, Within Home, Primary No stairs, 1 story home. Patient has a tub with no chair or railings. Also has a regular toilet. No RW or canes. Patient independent in all ADLs and IADLs (she does her own laundry, cooks, cleans). She does not drive.  -     Row Name 21 788          Cognition    Orientation Status (Cognition) oriented x 4  -[facility] Name 21 271           Safety Issues, Functional Mobility    Safety Issues Affecting Function (Mobility) insight into deficits/self-awareness; awareness of need for assistance  -     Impairments Affecting Function (Mobility) pain  -           User Key  (r) = Recorded By, (t) = Taken By, (c) = Cosigned By    Initials Name Provider Type    Fazal Clements OT Occupational Therapist                 Mobility/ADL's     Row Name 11/03/21 1550          Bed Mobility    Bed Mobility rolling left; rolling right; scooting/bridging  -     Rolling Left [place] (Bed Mobility) moderate assist (50% patient effort)  -     Rolling Right [place] (Bed Mobility) minimum assist (75% patient effort)  -     Scooting/Bridging [place] (Bed Mobility) 2 person assist; dependent (less than 25% patient effort)  -     Assistive Device (Bed Mobility) bed rails; draw sheet; head of bed elevated  -     Row Name 11/03/21 1550          Activities of Daily Living    BADL Assessment/Intervention grooming; lower body dressing  -     Row Name 11/03/21 [facility]0          Mobility    Extremity Weight-bearing Status left lower extremity  -     Left Lower Extremity (Weight-bearing Status) weight-bearing as tolerated (WBAT)  -     Row Name 11/03/21 1550          Grooming Assessment/Training    [place] Level (Grooming) set up  -SJ     Position (Grooming) supine  -SJ     Comment (Grooming) washing hands and face  -     Row Name 11/03/21 1550          Lower Body Dressing Assessment/Training    [place] Level (Lower Body Dressing) doff; don; socks; dependent (less than 25% patient effort)  -SJ     Position (Lower Body Dressing) supine  -           User Key  (r) = Recorded By, (t) = Taken By, (c) = Cosigned By    Initials Name Provider Type    Fazal Clements OT Occupational Therapist               Obj/Interventions     Row Name 11/03/21 1550          Sensory Assessment (Somatosensory)    Sensory Assessment (Somatosensory) UE  sensation intact  -SJ     Row Name 11/03/21 1550          Vision Assessment/Intervention    Visual Impairment/Limitations corrective lenses full-time  -[facility] Name 11/03/21 1550          Range of Motion Comprehensive    General Range of Motion bilateral upper extremity ROM WFL  -[facility] Name 11/03/21 1550          Strength Comprehensive (MMT)    General Manual Muscle Testing (MMT) Assessment other (see comments)  -     Comment, General Manual Muscle Testing (MMT) Assessment BUE 4-/5 grossly  -           User Key  (r) = Recorded By, (t) = Taken By, (c) = Cosigned By    Initials Name Provider Type     Fazal Cheatham, OT Occupational Therapist               Goals/Plan     Row Name 11/03/21 1550          Transfer Goal 1 (OT)    Activity/Assistive Device (Transfer Goal 1, OT) toilet  -     [place] Level/Cues Needed (Transfer Goal 1, OT) minimum assist (75% or more patient effort)  -SJ     Time Frame (Transfer Goal 1, OT) long term goal (LTG); by discharge  -     Progress/Outcome (Transfer Goal 1, OT) goal not met  -[facility] 11/03/21 1550          Bathing Goal 1 (OT)    Activity/Device (Bathing Goal 1, OT) lower body bathing  -     [place] Level/Cues Needed (Bathing Goal 1, OT) standby assist  -     Time Frame (Bathing Goal 1, OT) long term goal (LTG); by discharge  -     Progress/Outcomes (Bathing Goal 1, OT) goal not met  -[facility] 11/03/21 1550          Dressing Goal 1 (OT)    Activity/Device (Dressing Goal 1, OT) lower body dressing  -     [place]/Cues Needed (Dressing Goal 1, OT) standby assist; minimum assist (75% or more patient effort)  -SJ     Time Frame (Dressing Goal 1, OT) long term goal (LTG); by discharge  -     Progress/Outcome (Dressing Goal 1, OT) goal not met  -[facility] 11/03/21 1550          Toileting Goal 1 (OT)    [place] Level/Cues Needed (Toileting Goal 1, OT) minimum assist (75% or more patient effort)  -SJ     Time Frame  (Toileting Goal 1, OT) long term goal (LTG); by discharge  -     Progress/Outcome (Toileting Goal 1, OT) goal not met  -     Row Name 11/03/21 7740          Therapy Assessment/Plan (OT)    Planned Therapy Interventions (OT) activity tolerance training; adaptive equipment training; BADL retraining; cognitive/visual perception retraining; edema control/reduction; functional balance retraining; IADL retraining; manual therapy/joint mobilization; occupation/activity based interventions; patient/caregiver education/training; ROM/therapeutic exercise; strengthening exercise; transfer/mobility retraining  -           User Key  (r) = Recorded By, (t) = Taken By, (c) = Cosigned By    Initials Name Provider Type     Fazal Cheatham, OT Occupational Therapist               Clinical Impression     Row Name 11/03/21 9270          Pain Assessment    Additional Documentation Pain Scale: Numbers Pre/Post-Treatment (Group)  -[facility] Name 11/03/21 3090          Pain Scale: Numbers Pre/Post-Treatment    Pretreatment Pain Rating 10/10  -     Posttreatment Pain Rating 10/10  -     Pain Location - Side Left  -     Pain Location hip  -     Pre/Posttreatment Pain Comment RN to provide pain meds  -     Pain Intervention(s) Repositioned  -[facility] Name 11/03/21 6440          Plan of Care Review    Plan of Care Reviewed With patient  -     Outcome Summary OT eval complete, RN okay with OT evaluation. Supine in bed upon arrival, L LE still numb, but patient has distal movement as patient able to dorsi/plantar flex on L LE. Bed eval only as patient still numb. Rolling R with min A, rolling L with Mod A. X 2 person to scoot patient up towards HOB. Set up for feeding, hand hygiene. Dependent for LE dressing at this time (donning socks). Recommend rehab prior to return home.  -     Row Name 11/03/21 2770          Therapy Assessment/Plan (OT)    Patient/Family Therapy Goal Statement (OT) To be able to take care of her  self  -SJ     Rehab Potential (OT) good, to achieve stated therapy goals  -     Criteria for Skilled Therapeutic Interventions Met (OT) yes; meets criteria; skilled treatment is necessary  -     Therapy Frequency (OT) daily  -     Predicted Duration of Therapy Intervention (OT) until discharge or all goals met  -     Row Name 11/03/21 1550          Therapy Plan Review/Discharge Plan (OT)    Equipment Needs Upon Discharge (OT) tub bench; commode chair; raised toilet seat; hip kit  -     Anticipated Discharge Disposition (OT) inpatient rehabilitation facility; skilled nursing facility  -     Row Name 11/03/21 1850          Vital Signs    Pre Systolic BP Rehab 107  -SJ     Pre Treatment Diastolic BP 59  -SJ     Post Systolic BP Rehab 127  -SJ     Post Treatment Diastolic BP 69  -SJ     Pretreatment Heart Rate (beats/min) 68  -SJ     Posttreatment Heart Rate (beats/min) 68  -SJ     Pre SpO2 (%) 98  -SJ     O2 Delivery Pre Treatment room air  -     Post SpO2 (%) 97  -SJ     O2 Delivery Post Treatment room air  -SJ     Pre Patient Position Supine  -     Post Patient Position Side Lying  -[facility] Name 11/03/21 5840          Positioning and Restraints    Pre-Treatment Position in bed  -SJ     Post Treatment Position bed  -SJ     In Bed side lying right; call light within reach; encouraged to call for assist; with nsg; exit alarm on  -           User Key  (r) = Recorded By, (t) = Taken By, (c) = Cosigned By    Initials Name Provider Type     Fazal Cheatham, OT Occupational Therapist               Outcome Measures     Row Name 11/03/21 3294          How much help from another is currently needed...    Putting on and taking off regular lower body clothing? 1  -SJ     Bathing (including washing, rinsing, and drying) 2  -SJ     Toileting (which includes using toilet bed pan or urinal) 2  -SJ     Putting on and taking off regular upper body clothing 3  -SJ     Taking care of personal grooming (such as  brushing teeth) 4  -     Eating meals 4  -     AM-PAC 6 Clicks Score (OT) 16  -     Row Name 11/03/21 1550          Functional Assessment    Outcome Measure Options AM-PAC 6 Clicks Daily Activity (OT)  -           User Key  (r) = Recorded By, (t) = Taken By, (c) = Cosigned By    Initials Name Provider Type     Fazal Cheatham, OT Occupational Therapist                Occupational Therapy Education                 Title: PT OT SLP Therapies (In Progress)     Topic: Occupational Therapy (In Progress)     Point: ADL training (Not Started)     Description:   Instruct learner(s) on proper safety adaptation and remediation techniques during self care or transfers.   Instruct in proper use of assistive devices.              Learner Progress:  Not documented in this visit.          Point: Home exercise program (Not Started)     Description:   Instruct learner(s) on appropriate technique for monitoring, assisting and/or progressing therapeutic exercises/activities.              Learner Progress:  Not documented in this visit.          Point: Precautions (In Progress)     Description:   Instruct learner(s) on prescribed precautions during self-care and functional transfers.              Learning Progress Summary           Patient Acceptance, E,TB, NR by  at 11/3/2021 1621    Comment: POC, role of OT, transfer training                   Point: Body mechanics (In Progress)     Description:   Instruct learner(s) on proper positioning and spine alignment during self-care, functional mobility activities and/or exercises.              Learning Progress Summary           Patient Acceptance, E,TB, NR by  at 11/3/2021 1621    Comment: POC, role of OT, transfer training                               User Key     Initials Effective Dates Name Provider Type [facility] 06/14/21 -  Fazal Cheatham OT Occupational Therapist OT              OT Recommendation and Plan  Planned Therapy Interventions (OT): activity  tolerance training, adaptive equipment training, BADL retraining, cognitive/visual perception retraining, edema control/reduction, functional balance retraining, IADL retraining, manual therapy/joint mobilization, occupation/activity based interventions, patient/caregiver education/training, ROM/therapeutic exercise, strengthening exercise, transfer/mobility retraining  Therapy Frequency (OT): daily  Plan of Care Review  Plan of Care Reviewed With: patient  Outcome Summary: OT eval complete, RN okay with OT evaluation. Supine in bed upon arrival, L LE still numb, but patient has distal movement as patient able to dorsi/plantar flex on L LE. Bed eval only as patient still numb. Rolling R with min A, rolling L with Mod A. X 2 person to scoot patient up towards HOB. Set up for feeding, hand hygiene. Dependent for LE dressing at this time (donning socks). Recommend rehab prior to return home.     Time Calculation:    Time Calculation- OT     Row Name 11/03/21 1609             Time Calculation- OT    OT Start Time 1550  -      OT Stop Time 1605  -      OT Time Calculation (min) 15 min  -SJ      Total Timed Code Minutes- OT 15 minute(s)  -      OT Received On 11/03/21  -      OT Goal Re-Cert Due Date 11/16/21  -              Untimed Charges    OT Eval/Re-eval Minutes 15  -SJ              Total Minutes    Untimed Charges Total Minutes 15  -SJ       Total Minutes 15  -SJ            User Key  (r) = Recorded By, (t) = Taken By, (c) = Cosigned By    Initials Name Provider Type     Fazal Cheatham OT Occupational Therapist              Therapy Charges for Today     Code Description Service Date Service Provider Modifiers Qty    [id number]  OT EVAL MOD COMPLEXITY 1 11/3/2021 Fazal Cheatham OT GO 1               Fazal Cheatham OT  11/3/2021

## 2021-11-03 NOTE — ANESTHESIA POSTPROCEDURE EVALUATION
Patient: Corina Lenz    Procedure Summary     Date: 11/03/21 Room / Location: [facility] OR 11 / [facility] OR    Anesthesia Start: 1233 Anesthesia Stop:     Procedure: HIP TROCHANTERIC NAILING SHORT WITH INTRAMEDULLARY HIP SCREW (Left Hip) Diagnosis:       Closed displaced basicervical fracture of left femur, initial encounter ([facility])      (Closed displaced basicervical fracture of left femur, initial encounter ([facility]) [S72.042A])    Surgeons: Leobardo De Luna MD Provider: Michael Lawler MD    Anesthesia Type: general ASA Status: 3          Anesthesia Type: general    Vitals  No vitals data found for the desired time range.          Post Anesthesia Care and Evaluation    Patient location during evaluation: PACU  Patient participation: complete - patient cannot participate  Level of consciousness: sleepy but conscious  Pain score: 0  Pain management: adequate  Airway patency: patent  Anesthetic complications: No anesthetic complications  PONV Status: none  Cardiovascular status: acceptable  Respiratory status: acceptable, spontaneous ventilation and face mask  Hydration status: acceptable    Comments: 124/70, HR 82, sat 95%, resp 18, stable, report given to RN

## 2021-11-03 NOTE — PLAN OF CARE
Goal Outcome Evaluation:  Plan of Care Reviewed With: patient           Outcome Summary: OT milind complete, RN okay with OT evaluation. Supine in bed upon arrival, L LE still numb, but patient has distal movement as patient able to dorsi/plantar flex on L LE. Bed eval only as patient still numb. Rolling R with min A, rolling L with Mod A. X 2 person to scoot patient up towards HOB. Set up for feeding, hand hygiene. Dependent for LE dressing at this time (donning socks). Recommend rehab prior to return home.

## 2021-11-03 NOTE — PAYOR COMM NOTE
"Ashley Guerrero   [facility]  phone [phone]  fax [phone]    Admitted from ER to Observation 11/02/21 to Inpatient 11/3/21    REF# [id number]    Ministerio Clarke (81 y.o. Female)             Date of Birth Social Security Number Address Home Phone MRN    1940  [address] [address] [phone] [id number]    Sikhism Marital Status             None        Admission Date Admission Type Admitting Provider Attending Provider Department, Room/Bed    11/2/21 Emergency Camacho Cruz MD Ebenibo, Sotonte E, MD [facility] OR, MAD OR/MAIN OR    Discharge Date Discharge Disposition Discharge Destination                         Attending Provider: Camacho Cruz MD    Allergies: No Known Allergies    Isolation: None   Infection: None   Code Status: CPR   Advance Care Planning Activity    Ht: 162.6 cm (64\")   Wt: 69.9 kg (154 lb)    Admission Cmt: None   Principal Problem: Closed displaced fracture of base of neck of left femur (HCC) [S72.042A] More...                 Active Insurance as of 11/2/2021     Primary Coverage     Payor Plan Insurance Group Employer/Plan Group    ANTHEM MEDICARE REPLACEMENT ANTHEM MEDICARE ADVANTAGE [id number]     Payor Plan Address Payor Plan Phone Number Payor Plan Fax Number Effective Dates    [address] [phone]  1/1/2021 - None Entered    [facility] [id number]       Subscriber Name Subscriber Birth Date Member ID       MINISTERIO CLARKE 1940 [id number]                 Emergency Contacts      (Rel.) Home Phone Work Phone Mobile Phone    Donya Worley (Relative) [phone] -- [phone]               History & Physical      Juan Henning APRN at 11/02/21 1513     Attestation signed by Camacho Cruz MD at 11/02/21 2302    I have examined the patient, reviewed this documentation, discussed with APC and agree with the plan as outlined.    HPI: Patient " "presents with mechanical fall after tripping at home while trying to move furniture.  She complains of some pain on moving her left hip.    Physical exam  General: Alert, oriented x3, not in distress  Chest: Breath sounds clear, no rales  Musculoskeletal: Tenderness around left hip region    Assessment  Displaced fracture of left femoral neck  Essential hypertension    Plan  Pain control.  N.p.o. for midnight.  Orthopedic surgery consultation.  Plan for ORIF in the morning.  Continue other home medications.                            [facility] Admission      Date of Admission: 11/2/2021      Primary Care Physician: Provider, No Known      Chief Complaint: Fall    HPI:  This is a 81 year old female with a history of HTN, HLD, and \"mild CHF\" who presents to the ED with a complaint of left hip pain after a fall at home caused by tripping over a rug. X-ray noted comminuted intertrochanteric fracture.  She denies any pain at this time.      Concurrent Medical History:  has a past medical history of CHF (congestive heart failure) (HCC), HLD (hyperlipidemia), and Hypertension.    Past Surgical History: History reviewed. No pertinent surgical history. Denies surgical history.    Family History:   Family History   Problem Relation Age of Onset   • Heart disease Mother    • Cancer Mother    • Heart disease Father    • Cancer Father        Social History:   Social History     Socioeconomic History   • Marital status:    Tobacco Use   • Smoking status: Never Smoker   • Smokeless tobacco: Never Used       Allergies: No Known Allergies    Medications:   Coreg 3.125 mg PO BID  Simvistatin 20 mg PO daily  Lasix 20 mg PO daily  KCl 20 mEq PO cosby    Review of Systems:  Review of Systems   Constitutional: Negative for appetite change, chills, fatigue and fever.   HENT: Negative for congestion.    Eyes: Negative for visual disturbance.   Respiratory: Negative for cough, chest " tightness, shortness of breath and wheezing.    Cardiovascular: Negative for chest pain, palpitations and leg swelling.   Gastrointestinal: Negative for abdominal distention, abdominal pain, diarrhea, nausea and vomiting.   Genitourinary: Negative for difficulty urinating and dysuria.   Musculoskeletal: Positive for arthralgias (left hip). Negative for back pain, myalgias and neck pain.   Skin: Negative for rash and wound.   Neurological: Negative for dizziness, syncope, weakness, light-headedness, numbness and headaches.   All other systems reviewed and are negative.     Otherwise complete ROS is negative except as mentioned above.    Physical Exam:   Temp:  [97.9 °F (36.6 °C)-98 °F (36.7 °C)] 98 °F (36.7 °C)  Heart Rate:  [79-84] 79  Resp:  [17-18] 17  BP: (146-153)/(69-73) 149/73  Physical Exam  Vitals reviewed.   Constitutional:       General: She is not in acute distress.     Appearance: Normal appearance. She is well-developed. She is not ill-appearing.   HENT:      Head: Normocephalic and atraumatic.   Eyes:      Conjunctiva/sclera: Conjunctivae normal.   Cardiovascular:      Rate and Rhythm: Normal rate and regular rhythm.      Heart sounds: Normal heart sounds. No murmur heard.  No friction rub. No gallop.    Pulmonary:      Effort: Pulmonary effort is normal. No respiratory distress.      Breath sounds: Normal breath sounds. No wheezing or rales.   Abdominal:      General: Bowel sounds are normal. There is no distension.      Palpations: Abdomen is soft.      Tenderness: There is no abdominal tenderness.   Musculoskeletal:         General: Tenderness (left hip), deformity (left leg shortened) and signs of injury present. No swelling. Normal range of motion.      Cervical back: Normal range of motion and neck supple.   Skin:     General: Skin is warm and dry.      Findings: No erythema.   Neurological:      General: No focal deficit present.      Mental Status: She is alert and oriented to person, place,  and time.   Psychiatric:         Behavior: Behavior normal.         Thought Content: Thought content normal.         Judgment: Judgment normal.           Results Reviewed:  I have personally reviewed current lab, radiology, and data and agree with results.  Lab Results (last 24 hours)     Procedure Component Value Units Date/Time    CBC & Differential [[id number]]  (Abnormal) Collected: 11/02/21 1451    Specimen: Blood Updated: 11/02/21 1458    Narrative:      The following orders were created for panel order CBC & Differential.  Procedure                               Abnormality         Status                     ---------                               -----------         ------                     CBC Auto Differential[[id number]]        Abnormal            Final result                 Please view results for these tests on the individual orders.    CBC Auto Differential [[id number]]  (Abnormal) Collected: 11/02/21 1451    Specimen: Blood Updated: 11/02/21 1458     WBC 8.71 10*3/mm3      RBC 4.35 10*6/mm3      Hemoglobin 13.0 g/dL      Hematocrit 39.1 %      MCV 89.9 fL      MCH 29.9 pg      MCHC 33.2 g/dL      RDW 13.3 %      RDW-SD 43.8 fl      MPV 9.6 fL      Platelets 158 10*3/mm3      Neutrophil % 84.0 %      Lymphocyte % 10.2 %      Monocyte % 4.7 %      Eosinophil % 0.2 %      Basophil % 0.2 %      Immature Grans % 0.7 %      Neutrophils, Absolute 7.31 10*3/mm3      Lymphocytes, Absolute 0.89 10*3/mm3      Monocytes, Absolute 0.41 10*3/mm3      Eosinophils, Absolute 0.02 10*3/mm3      Basophils, Absolute 0.02 10*3/mm3      Immature Grans, Absolute 0.06 10*3/mm3      nRBC 0.0 /100 WBC     Gold Top - SST [[id number]] Collected: 11/02/21 1455    Specimen: Blood Updated: 11/02/21 1455    Extra Tubes [[id number]] Collected: 11/02/21 1455    Specimen: Blood, Venous Line Updated: 11/02/21 1455    Narrative:      The following orders were created for panel order Extra Tubes.  Procedure                                Abnormality         Status                     ---------                               -----------         ------                     Gold Top - [facility][[id number]]                                   In process                 Light Blue Top[[id number]]                                   In process                   Please view results for these tests on the individual orders.    Light Blue Top [[id number]] Collected: 11/02/21 1455    Specimen: Blood Updated: 11/02/21 1455    Comprehensive Metabolic Panel [[id number]] Collected: 11/02/21 1451    Specimen: Blood Updated: 11/02/21 1454    COVID-19 and FLU A/B PCR - Swab, Nasopharynx [[id number]] Collected: 11/02/21 1451    Specimen: Swab from Nasopharynx Updated: 11/02/21 1454        Imaging Results (Last 24 Hours)     Procedure Component Value Units Date/Time    XR Chest 1 View [[id number]] Collected: 11/02/21 1426     Updated: 11/02/21 1501    Narrative:      EXAM DESCRIPTION:     XR CHEST 1 VW    CLINICAL HISTORY:     81 years  Female  pre op, S72.002A Fracture of unspecified part  of neck of left femur, initial encounter for closed fracture    COMPARISON:     None available    TECHNIQUE:     One view-AP radiograph the chest    FINDINGS:     The lungs are well-expanded and clear. The cardiac silhouette and  pulmonary vasculature are within normal limits. There are no  pleural effusions.      Impression:          1. No radiographic evidence of acute cardiopulmonary disease.        Electronically signed by:  Lexus Hernandez MD  11/2/2021 3:00 PM  CDT Workstation: [id number]    XR Hip With or Without Pelvis 2 - 3 View Left [[id number]] Collected: 11/02/21 1323     Updated: 11/02/21 1422    Narrative:      AP pelvis single view and left hip two views three views total  November 2, 2021    INDICATION: Patient fell today with acute onset pain    FINDINGS:  Limited study secondary to limitation in patient positioning.  Comminuted intertrochanteric fracture may extend into the  femoral  neck. Multiple butterfly fragments suspected. No definite  anterior pelvic fracture.  Right hip grossly normal.  Degenerative changes lower lumbar region.      Impression:      Limited study.  Comminuted intertrochanteric fracture on the left may extend into  the femoral neck. Clinical correlation recommended. Depending  upon the clinical situation, he scan might be considered for more  complete evaluation.    Electronically signed by:  Kg Villa MD  11/2/2021 2:21 PM  CDT Workstation: [id number]            Assessment:      Displaced fracture of left femoral neck (HCC)    Primary hypertension            Plan:  1. Admit inpatient, Bedrest, NPO after midnight  2. Orthopedics consulted, plan for surgery tomorrow  3. CT left hip  4. Pain control: PRN tylenol, PRN Norco, PRN morphine  5. Resume home Coreg, Lasix, Potassium, and Statin  6. VTE PPx: Pharmacologic prophylaxis deferred to orthopedic surgery who ask for no anticoagulation at this time due to planned surgery      I confirmed that the patient's Advance Care Plan is present, code status is documented, or surrogate decision maker is listed in the patient's medical record.     I have utilized all available immediate resources to obtain, update, or review the patient's current medications.     I discussed the patient's findings and my recommendations with: patient, niece    Juan Henning, APRN  11/02/21  15:19 CDT                    Electronically signed by Camacho Cruz MD at 11/02/21 2301          Emergency Department Notes      Rex Pate RN at 11/02/21 1555        Called report to floor RN, awaiting transport       Rex Pate RN  11/02/21 5239      Electronically signed by Rex Pate RN at 11/02/21 1551         Lab Results (last 24 hours)     Procedure Component Value Units Date/Time    Comprehensive Metabolic Panel [[id number]]  (Abnormal) Collected: 11/03/21 0525    Specimen: Blood Updated: 11/03/21 0636     Glucose  118 mg/dL      BUN 11 mg/dL      Creatinine 0.59 mg/dL      Sodium 133 mmol/L      Potassium 3.8 mmol/L      Chloride 100 mmol/L      CO2 24.0 mmol/L      Calcium 8.9 mg/dL      Total Protein 6.6 g/dL      Albumin 3.80 g/dL      ALT (SGPT) 14 U/L      AST (SGOT) 22 U/L      Alkaline Phosphatase 32 U/L      Total Bilirubin 1.0 mg/dL      eGFR Non African Amer 98 mL/min/1.73      Globulin 2.8 gm/dL      A/G Ratio 1.4 g/dL      BUN/Creatinine Ratio 18.6     Anion Gap 9.0 mmol/L     Narrative:      GFR Normal >60  Chronic Kidney Disease <60  Kidney Failure <15      CBC & Differential [[id number]]  (Abnormal) Collected: 11/03/21 0525    Specimen: Blood Updated: 11/03/21 0607    Narrative:      The following orders were created for panel order CBC & Differential.  Procedure                               Abnormality         Status                     ---------                               -----------         ------                     CBC Auto Differential[[id number]]        Abnormal            Final result                 Please view results for these tests on the individual orders.    CBC Auto Differential [[id number]]  (Abnormal) Collected: 11/03/21 0525    Specimen: Blood Updated: 11/03/21 0607     WBC 6.56 10*3/mm3      RBC 3.72 10*6/mm3      Hemoglobin 11.1 g/dL      Hematocrit 32.8 %      MCV 88.2 fL      MCH 29.8 pg      MCHC 33.8 g/dL      RDW 13.2 %      RDW-SD 42.7 fl      MPV 10.0 fL      Platelets 152 10*3/mm3      Neutrophil % 68.6 %      Lymphocyte % 20.0 %      Monocyte % 10.4 %      Eosinophil % 0.2 %      Basophil % 0.3 %      Immature Grans % 0.5 %      Neutrophils, Absolute 4.51 10*3/mm3      Lymphocytes, Absolute 1.31 10*3/mm3      Monocytes, Absolute 0.68 10*3/mm3      Eosinophils, Absolute 0.01 10*3/mm3      Basophils, Absolute 0.02 10*3/mm3      Immature Grans, Absolute 0.03 10*3/mm3      nRBC 0.0 /100 WBC     Extra Tubes [[id number]] Collected: 11/02/21 6206    Specimen: Blood, Venous Line Updated:  11/02/21 1600    Narrative:      The following orders were created for panel order Extra Tubes.  Procedure                               Abnormality         Status                     ---------                               -----------         ------                     Gold Top - SST[[id number]]                                   Final result               Light Blue Top[[id number]]                                   Final result                 Please view results for these tests on the individual orders.    Gold Top - SST [[id number]] Collected: 11/02/21 1455    Specimen: Blood Updated: 11/02/21 1600     Extra Tube Hold for add-ons.     Comment: Auto resulted.       Light Blue Top [[id number]] Collected: 11/02/21 1455    Specimen: Blood Updated: 11/02/21 1600     Extra Tube hold for add-on     Comment: Auto resulted       COVID-19 and FLU A/B PCR - Swab, Nasopharynx [[id number]]  (Normal) Collected: 11/02/21 1451    Specimen: Swab from Nasopharynx Updated: 11/02/21 1526     COVID19 Not Detected     Influenza A PCR Not Detected     Influenza B PCR Not Detected    Narrative:      Fact sheet for providers: https://www.fda.gov/media/771484/download    Fact sheet for patients: https://www.fda.gov/media/474726/download    Test performed by PCR.    Comprehensive Metabolic Panel [[id number]]  (Abnormal) Collected: 11/02/21 1451    Specimen: Blood Updated: 11/02/21 1523     Glucose 122 mg/dL      BUN 13 mg/dL      Creatinine 0.78 mg/dL      Sodium 138 mmol/L      Potassium 3.5 mmol/L      Chloride 103 mmol/L      CO2 25.0 mmol/L      Calcium 9.4 mg/dL      Total Protein 7.5 g/dL      Albumin 4.30 g/dL      ALT (SGPT) 17 U/L      AST (SGOT) 35 U/L      Alkaline Phosphatase 40 U/L      Total Bilirubin 0.6 mg/dL      eGFR Non African Amer 71 mL/min/1.73      Globulin 3.2 gm/dL      A/G Ratio 1.3 g/dL      BUN/Creatinine Ratio 16.7     Anion Gap 10.0 mmol/L     Narrative:      GFR Normal >60  Chronic Kidney Disease <60  Kidney  Failure <15      CBC & Differential [[id number]]  (Abnormal) Collected: 11/02/21 1451    Specimen: Blood Updated: 11/02/21 1458    Narrative:      The following orders were created for panel order CBC & Differential.  Procedure                               Abnormality         Status                     ---------                               -----------         ------                     CBC Auto Differential[[id number]]        Abnormal            Final result                 Please view results for these tests on the individual orders.    CBC Auto Differential [[id number]]  (Abnormal) Collected: 11/02/21 1451    Specimen: Blood Updated: 11/02/21 1458     WBC 8.71 10*3/mm3      RBC 4.35 10*6/mm3      Hemoglobin 13.0 g/dL      Hematocrit 39.1 %      MCV 89.9 fL      MCH 29.9 pg      MCHC 33.2 g/dL      RDW 13.3 %      RDW-SD 43.8 fl      MPV 9.6 fL      Platelets 158 10*3/mm3      Neutrophil % 84.0 %      Lymphocyte % 10.2 %      Monocyte % 4.7 %      Eosinophil % 0.2 %      Basophil % 0.2 %      Immature Grans % 0.7 %      Neutrophils, Absolute 7.31 10*3/mm3      Lymphocytes, Absolute 0.89 10*3/mm3      Monocytes, Absolute 0.41 10*3/mm3      Eosinophils, Absolute 0.02 10*3/mm3      Basophils, Absolute 0.02 10*3/mm3      Immature Grans, Absolute 0.06 10*3/mm3      nRBC 0.0 /100 WBC         Imaging Results (Last 24 Hours)     Procedure Component Value Units Date/Time    CT Lower Extremity Left Without Contrast [[id number]] Collected: 11/02/21 1553     Updated: 11/02/21 1731    Narrative:      PROCEDURE: CT LOWER EXTREMITY WITHOUT IV CONTRAST    TECHNIQUE: Multichannel computed tomography of the left hip  without contrast.  Coronal and sagittal reformatted images were also obtained to  improve fracture detection.    Contrast: None    This exam was performed using radiation doses that are as low as  reasonably achievable (ALARA).  This exam was performed according to our departmental dose  optimization program, which  includes automated exposure control,  adjustment of the mA and/or KV according to patient size and/or  use of iterative reconstruction technique.    COMPARISON: Left hip radiographs performed the same date.    HISTORY: further eval of left hip fracture per ortho, S72.002A  Fracture of unspecified part of neck of left femur, initial  encounter for closed fracture    FINDINGS  There is a comminuted left intertrochanteric fracture to the base  of the femoral neck. There are degenerative changes of the left  hip joint. There is a moderate amount stool in the rectum. There  is a left adnexal cystic structure measuring 2.9 x 2.6 cm.  Recommend pelvic ultrasound in 6-12 weeks.      Impression:      CONCLUSION:   Comminuted left intertrochanteric proximal femoral fracture also  involving the base of the femoral neck.  Degenerative changes of the left hip.  Left adnexal cystic structure measuring 2.9 x 2.6 cm. Recommend  pelvic ultrasound in 6-12 weeks.    Electronically signed by:  Taye Poon MD  11/2/2021 5:30 PM CDT  Workstation: [id number]    XR Chest 1 View [[id number]] Collected: 11/02/21 1426     Updated: 11/02/21 1501    Narrative:      EXAM DESCRIPTION:     XR CHEST 1 VW    CLINICAL HISTORY:     81 years  Female  pre op, S72.002A Fracture of unspecified part  of neck of left femur, initial encounter for closed fracture    COMPARISON:     None available    TECHNIQUE:     One view-AP radiograph the chest    FINDINGS:     The lungs are well-expanded and clear. The cardiac silhouette and  pulmonary vasculature are within normal limits. There are no  pleural effusions.      Impression:          1. No radiographic evidence of acute cardiopulmonary disease.        Electronically signed by:  Lexus Hernandez MD  11/2/2021 3:00 PM  CDT Workstation: [id number]    XR Hip With or Without Pelvis 2 - 3 View Left [[id number]] Collected: 11/02/21 1323     Updated: 11/02/21 1422    Narrative:      AP pelvis single view and left hip  two views three views total  November 2, 2021    INDICATION: Patient fell today with acute onset pain    FINDINGS:  Limited study secondary to limitation in patient positioning.  Comminuted intertrochanteric fracture may extend into the femoral  neck. Multiple butterfly fragments suspected. No definite  anterior pelvic fracture.  Right hip grossly normal.  Degenerative changes lower lumbar region.      Impression:      Limited study.  Comminuted intertrochanteric fracture on the left may extend into  the femoral neck. Clinical correlation recommended. Depending  upon the clinical situation, he scan might be considered for more  complete evaluation.    Electronically signed by:  Kg Villa MD  11/2/2021 2:21 PM  CDT Workstation: [id number]        ECG/EMG Results (last 24 hours)     Procedure Component Value Units Date/Time    SCANNED EKG [[id number]] Resulted: 11/02/21     Updated: 11/02/21 1906    ECG 12 Lead [[id number]] Collected: 11/02/21 1452     Updated: 11/02/21 2019     QT Interval 410 ms      QTC Interval 526 ms     Narrative:      Test Reason : pre op  Blood Pressure :   */*   mmHG  Vent. Rate :  99 BPM     Atrial Rate :  99 BPM     P-R Int : 166 ms          QRS Dur : 132 ms      QT Int : 410 ms       P-R-T Axes :  73 -64  84 degrees     QTc Int : 526 ms    Normal sinus rhythm  Left axis deviation (LAFB)  Right bundle branch block  Left ventricular hypertrophy with repolarization abnormality  Anterior infarct , age undetermined  Abnormal ECG  No previous ECGs available    Referred By: DAPHNE           Confirmed By: PATRICIA WAGNER MD          Physician Progress Notes (last 24 hours)  Notes from 11/02/21 1141 through 11/03/21 1141   No notes of this type exist for this encounter.            Consult Notes (last 24 hours)      Leobardo De Luna MD at 11/03/21 0659          ORTHOPAEDIC CONSULT     Patient Name:  Corina Lenz  Admit Date:  11/2/2021  Consult Date:  11/3/2021    Referring Provider:   Fahad  Reason for Consultation: Fracture left hip    Patient Care Team:  Jane Lepe Known as PCP - General    History of present illness: Ms. Lenz is 81 years old.  She slipped and fell yesterday with an isolated injury to the left hip.  There is no history of back pain or loss of consciousness.  Presented emergency room where x-rays showed a fracture of the left hip.  She was admitted for further investigation and treatment.    Home medications include Coreg Lasix calcium chloride and simvastatin.  She has no drug allergies.  She does not smoke.    Past medical history shows her general health is been good.  She has had no previous surgeries.    Family history she is .    Social history she lives in [place] with a niece.  She said prior to her injury she was a normal community ambulator and uses no ambulatory aids.    Review of Systems remarkable for left hip pain.  Otherwise complete ROS is negative except as mentioned above.    Prior to Admission medications    Medication Sig Start Date End Date Taking? Authorizing Provider   carvedilol (COREG) 3.125 MG tablet Take 3.125 mg by mouth 2 (Two) Times a Day With Meals.   Yes Stefanie Lepe MD   furosemide (LASIX) 20 MG tablet Take 1 tablet by mouth Every Morning. 7/19/21  Yes Stefanie Lepe MD   potassium chloride (MICRO-K) 10 MEQ CR capsule Take 20 mEq by mouth Daily.   Yes Stefanie Lepe MD   simvastatin (ZOCOR) 20 MG tablet Take 20 mg by mouth Every Night.   Yes Stefanie Lepe MD     No Known Allergies  Social History     Socioeconomic History   • Marital status:    Tobacco Use   • Smoking status: Never Smoker   • Smokeless tobacco: Never Used   Vaping Use   • Vaping Use: Never used   Substance and Sexual Activity   • Alcohol use: Never   • Drug use: Never   • Sexual activity: Defer     Past Medical History:   Diagnosis Date   • CHF (congestive heart failure) (HCC)    • HLD (hyperlipidemia)    • Hypertension       History reviewed. No pertinent surgical history.  Family History   Problem Relation Age of Onset   • Heart disease Mother    • Cancer Mother    • Heart disease Father    • Cancer Father        Vital Signs   Temp:  [95.5 °F (35.3 °C)-98.2 °F (36.8 °C)] 97 °F (36.1 °C)  Heart Rate:  [76-84] 76  Resp:  [15-20] 18  BP: (137-153)/(63-75) 137/66      11/03/21  0500   Weight: 69.9 kg (154 lb)     Body mass index is 26.43 kg/m².    Physical Exam she is alert calm and in no apparent distress.  She responds appropriately to questions and commands.    Musculoskeletal exam is directed to the lower extremities.  Skin about the left hip is unremarkable.  There is pain on attempts at movement of the left hip.  Dorsalis pedis pulses strong bilaterally.  Sensory exam is intact to soft touch.  Sciatic motor function is grossly intact although exam is compromised somewhat by her pain.    Radiographs of the left hip done on admission show what appears to be a minimally displaced cervical trochanteric fracture of the femur.  There is also degenerative change in the hip.  The studies are technically limited due to the positioning of the limb.  CT scan of the hip confirms a slightly displaced cervical trochanteric fracture of the femur with moderate degenerative change of the hip joint.    Lab studies show glucose 118 sodium 133 BUN 11 creatinine 0.59 albumin 3.8.  Hemoglobin today is 11.1 with platelet count 152,000.  Covid testing is negative.  Results of urinalysis are pending.    Results Review:  Imaging Results (Last 24 Hours)     Procedure Component Value Units Date/Time    CT Lower Extremity Left Without Contrast [[id number]] Collected: 11/02/21 1553     Updated: 11/02/21 1731    Narrative:      PROCEDURE: CT LOWER EXTREMITY WITHOUT IV CONTRAST    TECHNIQUE: Multichannel computed tomography of the left hip  without contrast.  Coronal and sagittal reformatted images were also obtained to  improve fracture detection.    Contrast:  None    This exam was performed using radiation doses that are as low as  reasonably achievable (ALARA).  This exam was performed according to our departmental dose  optimization program, which includes automated exposure control,  adjustment of the mA and/or KV according to patient size and/or  use of iterative reconstruction technique.    COMPARISON: Left hip radiographs performed the same date.    HISTORY: further eval of left hip fracture per ortho, S72.002A  Fracture of unspecified part of neck of left femur, initial  encounter for closed fracture    FINDINGS  There is a comminuted left intertrochanteric fracture to the base  of the femoral neck. There are degenerative changes of the left  hip joint. There is a moderate amount stool in the rectum. There  is a left adnexal cystic structure measuring 2.9 x 2.6 cm.  Recommend pelvic ultrasound in 6-12 weeks.      Impression:      CONCLUSION:   Comminuted left intertrochanteric proximal femoral fracture also  involving the base of the femoral neck.  Degenerative changes of the left hip.  Left adnexal cystic structure measuring 2.9 x 2.6 cm. Recommend  pelvic ultrasound in 6-12 weeks.    Electronically signed by:  Taye Poon MD  11/2/2021 5:30 PM CDT  Workstation: [id number]    XR Chest 1 View [[id number]] Collected: 11/02/21 1426     Updated: 11/02/21 1501    Narrative:      EXAM DESCRIPTION:     XR CHEST 1 VW    CLINICAL HISTORY:     81 years  Female  pre op, S72.002A Fracture of unspecified part  of neck of left femur, initial encounter for closed fracture    COMPARISON:     None available    TECHNIQUE:     One view-AP radiograph the chest    FINDINGS:     The lungs are well-expanded and clear. The cardiac silhouette and  pulmonary vasculature are within normal limits. There are no  pleural effusions.      Impression:          1. No radiographic evidence of acute cardiopulmonary disease.        Electronically signed by:  Lexus Hernandez MD  11/2/2021 3:00  PM  CDT Workstation: [id number]    XR Hip With or Without Pelvis 2 - 3 View Left [[id number]] Collected: 11/02/21 1323     Updated: 11/02/21 1422    Narrative:      AP pelvis single view and left hip two views three views total  November 2, 2021    INDICATION: Patient fell today with acute onset pain    FINDINGS:  Limited study secondary to limitation in patient positioning.  Comminuted intertrochanteric fracture may extend into the femoral  neck. Multiple butterfly fragments suspected. No definite  anterior pelvic fracture.  Right hip grossly normal.  Degenerative changes lower lumbar region.      Impression:      Limited study.  Comminuted intertrochanteric fracture on the left may extend into  the femoral neck. Clinical correlation recommended. Depending  upon the clinical situation, he scan might be considered for more  complete evaluation.    Electronically signed by:  Kg Villa MD  11/2/2021 2:21 PM  CDT Workstation: [id number]        Lab Results (last 24 hours)     Procedure Component Value Units Date/Time    Comprehensive Metabolic Panel [[id number]]  (Abnormal) Collected: 11/03/21 0525    Specimen: Blood Updated: 11/03/21 0636     Glucose 118 mg/dL      BUN 11 mg/dL      Creatinine 0.59 mg/dL      Sodium 133 mmol/L      Potassium 3.8 mmol/L      Chloride 100 mmol/L      CO2 24.0 mmol/L      Calcium 8.9 mg/dL      Total Protein 6.6 g/dL      Albumin 3.80 g/dL      ALT (SGPT) 14 U/L      AST (SGOT) 22 U/L      Alkaline Phosphatase 32 U/L      Total Bilirubin 1.0 mg/dL      eGFR Non African Amer 98 mL/min/1.73      Globulin 2.8 gm/dL      A/G Ratio 1.4 g/dL      BUN/Creatinine Ratio 18.6     Anion Gap 9.0 mmol/L     Narrative:      GFR Normal >60  Chronic Kidney Disease <60  Kidney Failure <15      CBC & Differential [[id number]]  (Abnormal) Collected: 11/03/21 0525    Specimen: Blood Updated: 11/03/21 0607    Narrative:      The following orders were created for panel order CBC & Differential.  Procedure                                Abnormality         Status                     ---------                               -----------         ------                     CBC Auto Differential[[id number]]        Abnormal            Final result                 Please view results for these tests on the individual orders.    CBC Auto Differential [[id number]]  (Abnormal) Collected: 11/03/21 0525    Specimen: Blood Updated: 11/03/21 0607     WBC 6.56 10*3/mm3      RBC 3.72 10*6/mm3      Hemoglobin 11.1 g/dL      Hematocrit 32.8 %      MCV 88.2 fL      MCH 29.8 pg      MCHC 33.8 g/dL      RDW 13.2 %      RDW-SD 42.7 fl      MPV 10.0 fL      Platelets 152 10*3/mm3      Neutrophil % 68.6 %      Lymphocyte % 20.0 %      Monocyte % 10.4 %      Eosinophil % 0.2 %      Basophil % 0.3 %      Immature Grans % 0.5 %      Neutrophils, Absolute 4.51 10*3/mm3      Lymphocytes, Absolute 1.31 10*3/mm3      Monocytes, Absolute 0.68 10*3/mm3      Eosinophils, Absolute 0.01 10*3/mm3      Basophils, Absolute 0.02 10*3/mm3      Immature Grans, Absolute 0.03 10*3/mm3      nRBC 0.0 /100 WBC     Extra Tubes [[id number]] Collected: 11/02/21 1455    Specimen: Blood, Venous Line Updated: 11/02/21 1600    Narrative:      The following orders were created for panel order Extra Tubes.  Procedure                               Abnormality         Status                     ---------                               -----------         ------                     Gold Top - SST[[id number]]                                   Final result               Light Blue Top[[id number]]                                   Final result                 Please view results for these tests on the individual orders.    Gold Top - SST [[id number]] Collected: 11/02/21 1455    Specimen: Blood Updated: 11/02/21 1600     Extra Tube Hold for add-ons.     Comment: Auto resulted.       Light Blue Top [[id number]] Collected: 11/02/21 1455    Specimen: Blood Updated: 11/02/21 1600     Extra Tube  hold for add-on     Comment: Auto resulted       COVID-19 and FLU A/B PCR - Swab, Nasopharynx [[id number]]  (Normal) Collected: 11/02/21 1451    Specimen: Swab from Nasopharynx Updated: 11/02/21 1526     COVID19 Not Detected     Influenza A PCR Not Detected     Influenza B PCR Not Detected    Narrative:      Fact sheet for providers: https://www.fda.gov/media/118472/download    Fact sheet for patients: https://www.fda.gov/media/237524/download    Test performed by PCR.    Comprehensive Metabolic Panel [[id number]]  (Abnormal) Collected: 11/02/21 1451    Specimen: Blood Updated: 11/02/21 1523     Glucose 122 mg/dL      BUN 13 mg/dL      Creatinine 0.78 mg/dL      Sodium 138 mmol/L      Potassium 3.5 mmol/L      Chloride 103 mmol/L      CO2 25.0 mmol/L      Calcium 9.4 mg/dL      Total Protein 7.5 g/dL      Albumin 4.30 g/dL      ALT (SGPT) 17 U/L      AST (SGOT) 35 U/L      Alkaline Phosphatase 40 U/L      Total Bilirubin 0.6 mg/dL      eGFR Non African Amer 71 mL/min/1.73      Globulin 3.2 gm/dL      A/G Ratio 1.3 g/dL      BUN/Creatinine Ratio 16.7     Anion Gap 10.0 mmol/L     Narrative:      GFR Normal >60  Chronic Kidney Disease <60  Kidney Failure <15      CBC & Differential [[id number]]  (Abnormal) Collected: 11/02/21 1451    Specimen: Blood Updated: 11/02/21 1458    Narrative:      The following orders were created for panel order CBC & Differential.  Procedure                               Abnormality         Status                     ---------                               -----------         ------                     CBC Auto Differential[[id number]]        Abnormal            Final result                 Please view results for these tests on the individual orders.    CBC Auto Differential [[id number]]  (Abnormal) Collected: 11/02/21 1451    Specimen: Blood Updated: 11/02/21 1458     WBC 8.71 10*3/mm3      RBC 4.35 10*6/mm3      Hemoglobin 13.0 g/dL      Hematocrit 39.1 %      MCV 89.9 fL      MCH 29.9 pg       MCHC 33.2 g/dL      RDW 13.3 %      RDW-SD 43.8 fl      MPV 9.6 fL      Platelets 158 10*3/mm3      Neutrophil % 84.0 %      Lymphocyte % 10.2 %      Monocyte % 4.7 %      Eosinophil % 0.2 %      Basophil % 0.2 %      Immature Grans % 0.7 %      Neutrophils, Absolute 7.31 10*3/mm3      Lymphocytes, Absolute 0.89 10*3/mm3      Monocytes, Absolute 0.41 10*3/mm3      Eosinophils, Absolute 0.02 10*3/mm3      Basophils, Absolute 0.02 10*3/mm3      Immature Grans, Absolute 0.06 10*3/mm3      nRBC 0.0 /100 WBC           Assessment/Plan Cervical trochanteric fracture left femur.    The natural history of the disorder and treatment options were reviewed.  She understands there is no surgery that will return her to her preinjury functional level.  We discussed both nonoperative and surgical treatment.  I recommended reduction and stabilization of the fracture with a cephalomedullary nail.  This should significantly improve pain control and will allow earlier mobilization and facilitate rehabilitation and nursing care.  Potential for leg length inequality and deep venous thrombosis were discussed.  She also understands there is a small risk of revision surgery.  Other potential complications of surgery and anesthetic were reviewed in detail including but not limited to infection blood loss neurovascular damage sore throat pneumonia brain damage and even death.  She appears understand all matters discussed and wished to proceed.    Pending medical clearance  we anticipate performing her surgery later today.    I appreciate 's referral.      Closed displaced fracture of base of neck of left femur (HCC)    Displaced fracture of left femoral neck (HCC)    Primary hypertension        Leobardo De Luna MD  11/03/21  06:59 CDT    Electronically signed by Leobardo De Luna MD at 11/03/21 0724

## 2021-11-03 NOTE — CONSULTS
ORTHOPAEDIC CONSULT     Patient Name:  Corina Lenz  Admit Date:  11/2/2021  Consult Date:  11/3/2021    Referring Provider: Dr. Vásquez  Reason for Consultation: Fracture left hip    Patient Care Team:  Jane Lepe Known as PCP - General    History of present illness: Ms. Lenz is 81 years old.  She slipped and fell yesterday with an isolated injury to the left hip.  There is no history of back pain or loss of consciousness.  Presented emergency room where x-rays showed a fracture of the left hip.  She was admitted for further investigation and treatment.    Home medications include Coreg Lasix calcium chloride and simvastatin.  She has no drug allergies.  She does not smoke.    Past medical history shows her general health is been good.  She has had no previous surgeries.    Family history she is .    Social history she lives in [place] with a niece.  She said prior to her injury she was a normal community ambulator and uses no ambulatory aids.    Review of Systems remarkable for left hip pain.  Otherwise complete ROS is negative except as mentioned above.    Prior to Admission medications    Medication Sig Start Date End Date Taking? Authorizing Provider   carvedilol (COREG) 3.125 MG tablet Take 3.125 mg by mouth 2 (Two) Times a Day With Meals.   Yes Stefanie Lepe MD   furosemide (LASIX) 20 MG tablet Take 1 tablet by mouth Every Morning. 7/19/21  Yes Stefanie Lepe MD   potassium chloride (MICRO-K) 10 MEQ CR capsule Take 20 mEq by mouth Daily.   Yes Stefanie Lepe MD   simvastatin (ZOCOR) 20 MG tablet Take 20 mg by mouth Every Night.   Yes Stefanie Lepe MD     No Known Allergies  Social History     Socioeconomic History   • Marital status:    Tobacco Use   • Smoking status: Never Smoker   • Smokeless tobacco: Never Used   Vaping Use   • Vaping Use: Never used   Substance and Sexual Activity   • Alcohol use: Never   • Drug use: Never   • Sexual activity: Defer      Past Medical History:   Diagnosis Date   • CHF (congestive heart failure) (HCC)    • HLD (hyperlipidemia)    • Hypertension      History reviewed. No pertinent surgical history.  Family History   Problem Relation Age of Onset   • Heart disease Mother    • Cancer Mother    • Heart disease Father    • Cancer Father        Vital Signs   Temp:  [95.5 °F (35.3 °C)-98.2 °F (36.8 °C)] 97 °F (36.1 °C)  Heart Rate:  [76-84] 76  Resp:  [15-20] 18  BP: (137-153)/(63-75) 137/66      11/03/21  0500   Weight: 69.9 kg (154 lb)     Body mass index is 26.43 kg/m².    Physical Exam she is alert calm and in no apparent distress.  She responds appropriately to questions and commands.    Musculoskeletal exam is directed to the lower extremities.  Skin about the left hip is unremarkable.  There is pain on attempts at movement of the left hip.  Dorsalis pedis pulses strong bilaterally.  Sensory exam is intact to soft touch.  Sciatic motor function is grossly intact although exam is compromised somewhat by her pain.    Radiographs of the left hip done on admission show what appears to be a minimally displaced cervical trochanteric fracture of the femur.  There is also degenerative change in the hip.  The studies are technically limited due to the positioning of the limb.  CT scan of the hip confirms a slightly displaced cervical trochanteric fracture of the femur with moderate degenerative change of the hip joint.    Lab studies show glucose 118 sodium 133 BUN 11 creatinine 0.59 albumin 3.8.  Hemoglobin today is 11.1 with platelet count 152,000.  Covid testing is negative.  Results of urinalysis are pending.    Results Review:  Imaging Results (Last 24 Hours)     Procedure Component Value Units Date/Time    CT Lower Extremity Left Without Contrast [[id number]] Collected: 11/02/21 1553     Updated: 11/02/21 1731    Narrative:      PROCEDURE: CT LOWER EXTREMITY WITHOUT IV CONTRAST    TECHNIQUE: Multichannel computed tomography of the left  hip  without contrast.  Coronal and sagittal reformatted images were also obtained to  improve fracture detection.    Contrast: None    This exam was performed using radiation doses that are as low as  reasonably achievable (ALARA).  This exam was performed according to our departmental dose  optimization program, which includes automated exposure control,  adjustment of the mA and/or KV according to patient size and/or  use of iterative reconstruction technique.    COMPARISON: Left hip radiographs performed the same date.    HISTORY: further eval of left hip fracture per ortho, S72.002A  Fracture of unspecified part of neck of left femur, initial  encounter for closed fracture    FINDINGS  There is a comminuted left intertrochanteric fracture to the base  of the femoral neck. There are degenerative changes of the left  hip joint. There is a moderate amount stool in the rectum. There  is a left adnexal cystic structure measuring 2.9 x 2.6 cm.  Recommend pelvic ultrasound in 6-12 weeks.      Impression:      CONCLUSION:   Comminuted left intertrochanteric proximal femoral fracture also  involving the base of the femoral neck.  Degenerative changes of the left hip.  Left adnexal cystic structure measuring 2.9 x 2.6 cm. Recommend  pelvic ultrasound in 6-12 weeks.    Electronically signed by:  Taye Poon MD  11/2/2021 5:30 PM CDT  Workstation: [id number]    XR Chest 1 View [[id number]] Collected: 11/02/21 1426     Updated: 11/02/21 1501    Narrative:      EXAM DESCRIPTION:     XR CHEST 1 VW    CLINICAL HISTORY:     81 years  Female  pre op, S72.002A Fracture of unspecified part  of neck of left femur, initial encounter for closed fracture    COMPARISON:     None available    TECHNIQUE:     One view-AP radiograph the chest    FINDINGS:     The lungs are well-expanded and clear. The cardiac silhouette and  pulmonary vasculature are within normal limits. There are no  pleural effusions.      Impression:          1. No  radiographic evidence of acute cardiopulmonary disease.        Electronically signed by:  Lexus Hernandez MD  11/2/2021 3:00 PM  CDT Workstation: [id number]    XR Hip With or Without Pelvis 2 - 3 View Left [[id number]] Collected: 11/02/21 1323     Updated: 11/02/21 1422    Narrative:      AP pelvis single view and left hip two views three views total  November 2, 2021    INDICATION: Patient fell today with acute onset pain    FINDINGS:  Limited study secondary to limitation in patient positioning.  Comminuted intertrochanteric fracture may extend into the femoral  neck. Multiple butterfly fragments suspected. No definite  anterior pelvic fracture.  Right hip grossly normal.  Degenerative changes lower lumbar region.      Impression:      Limited study.  Comminuted intertrochanteric fracture on the left may extend into  the femoral neck. Clinical correlation recommended. Depending  upon the clinical situation, he scan might be considered for more  complete evaluation.    Electronically signed by:  Kg Villa MD  11/2/2021 2:21 PM  CDT Workstation: [id number]        Lab Results (last 24 hours)     Procedure Component Value Units Date/Time    Comprehensive Metabolic Panel [[id number]]  (Abnormal) Collected: 11/03/21 0525    Specimen: Blood Updated: 11/03/21 0636     Glucose 118 mg/dL      BUN 11 mg/dL      Creatinine 0.59 mg/dL      Sodium 133 mmol/L      Potassium 3.8 mmol/L      Chloride 100 mmol/L      CO2 24.0 mmol/L      Calcium 8.9 mg/dL      Total Protein 6.6 g/dL      Albumin 3.80 g/dL      ALT (SGPT) 14 U/L      AST (SGOT) 22 U/L      Alkaline Phosphatase 32 U/L      Total Bilirubin 1.0 mg/dL      eGFR Non African Amer 98 mL/min/1.73      Globulin 2.8 gm/dL      A/G Ratio 1.4 g/dL      BUN/Creatinine Ratio 18.6     Anion Gap 9.0 mmol/L     Narrative:      GFR Normal >60  Chronic Kidney Disease <60  Kidney Failure <15      CBC & Differential [[id number]]  (Abnormal) Collected: 11/03/21 0525    Specimen: Blood  Updated: 11/03/21 0607    Narrative:      The following orders were created for panel order CBC & Differential.  Procedure                               Abnormality         Status                     ---------                               -----------         ------                     CBC Auto Differential[[id number]]        Abnormal            Final result                 Please view results for these tests on the individual orders.    CBC Auto Differential [[id number]]  (Abnormal) Collected: 11/03/21 0525    Specimen: Blood Updated: 11/03/21 0607     WBC 6.56 10*3/mm3      RBC 3.72 10*6/mm3      Hemoglobin 11.1 g/dL      Hematocrit 32.8 %      MCV 88.2 fL      MCH 29.8 pg      MCHC 33.8 g/dL      RDW 13.2 %      RDW-SD 42.7 fl      MPV 10.0 fL      Platelets 152 10*3/mm3      Neutrophil % 68.6 %      Lymphocyte % 20.0 %      Monocyte % 10.4 %      Eosinophil % 0.2 %      Basophil % 0.3 %      Immature Grans % 0.5 %      Neutrophils, Absolute 4.51 10*3/mm3      Lymphocytes, Absolute 1.31 10*3/mm3      Monocytes, Absolute 0.68 10*3/mm3      Eosinophils, Absolute 0.01 10*3/mm3      Basophils, Absolute 0.02 10*3/mm3      Immature Grans, Absolute 0.03 10*3/mm3      nRBC 0.0 /100 WBC     Extra Tubes [[id number]] Collected: 11/02/21 1455    Specimen: Blood, Venous Line Updated: 11/02/21 1600    Narrative:      The following orders were created for panel order Extra Tubes.  Procedure                               Abnormality         Status                     ---------                               -----------         ------                     Gold Top - SST[[id number]]                                   Final result               Light Blue Top[[id number]]                                   Final result                 Please view results for these tests on the individual orders.    Gold Top - SST [[id number]] Collected: 11/02/21 1455    Specimen: Blood Updated: 11/02/21 1600     Extra Tube Hold for add-ons.     Comment: Auto  resulted.       Light Blue Top [[id number]] Collected: 11/02/21 1455    Specimen: Blood Updated: 11/02/21 1600     Extra Tube hold for add-on     Comment: Auto resulted       COVID-19 and FLU A/B PCR - Swab, Nasopharynx [[id number]]  (Normal) Collected: 11/02/21 1451    Specimen: Swab from Nasopharynx Updated: 11/02/21 1526     COVID19 Not Detected     Influenza A PCR Not Detected     Influenza B PCR Not Detected    Narrative:      Fact sheet for providers: https://www.fda.gov/media/880398/download    Fact sheet for patients: https://www.fda.gov/media/430989/download    Test performed by PCR.    Comprehensive Metabolic Panel [[id number]]  (Abnormal) Collected: 11/02/21 1451    Specimen: Blood Updated: 11/02/21 1523     Glucose 122 mg/dL      BUN 13 mg/dL      Creatinine 0.78 mg/dL      Sodium 138 mmol/L      Potassium 3.5 mmol/L      Chloride 103 mmol/L      CO2 25.0 mmol/L      Calcium 9.4 mg/dL      Total Protein 7.5 g/dL      Albumin 4.30 g/dL      ALT (SGPT) 17 U/L      AST (SGOT) 35 U/L      Alkaline Phosphatase 40 U/L      Total Bilirubin 0.6 mg/dL      eGFR Non African Amer 71 mL/min/1.73      Globulin 3.2 gm/dL      A/G Ratio 1.3 g/dL      BUN/Creatinine Ratio 16.7     Anion Gap 10.0 mmol/L     Narrative:      GFR Normal >60  Chronic Kidney Disease <60  Kidney Failure <15      CBC & Differential [[id number]]  (Abnormal) Collected: 11/02/21 1451    Specimen: Blood Updated: 11/02/21 1458    Narrative:      The following orders were created for panel order CBC & Differential.  Procedure                               Abnormality         Status                     ---------                               -----------         ------                     CBC Auto Differential[[id number]]        Abnormal            Final result                 Please view results for these tests on the individual orders.    CBC Auto Differential [[id number]]  (Abnormal) Collected: 11/02/21 1451    Specimen: Blood Updated: 11/02/21 1458      WBC 8.71 10*3/mm3      RBC 4.35 10*6/mm3      Hemoglobin 13.0 g/dL      Hematocrit 39.1 %      MCV 89.9 fL      MCH 29.9 pg      MCHC 33.2 g/dL      RDW 13.3 %      RDW-SD 43.8 fl      MPV 9.6 fL      Platelets 158 10*3/mm3      Neutrophil % 84.0 %      Lymphocyte % 10.2 %      Monocyte % 4.7 %      Eosinophil % 0.2 %      Basophil % 0.2 %      Immature Grans % 0.7 %      Neutrophils, Absolute 7.31 10*3/mm3      Lymphocytes, Absolute 0.89 10*3/mm3      Monocytes, Absolute 0.41 10*3/mm3      Eosinophils, Absolute 0.02 10*3/mm3      Basophils, Absolute 0.02 10*3/mm3      Immature Grans, Absolute 0.06 10*3/mm3      nRBC 0.0 /100 WBC           Assessment/Plan Cervical trochanteric fracture left femur.    The natural history of the disorder and treatment options were reviewed.  She understands there is no surgery that will return her to her preinjury functional level.  We discussed both nonoperative and surgical treatment.  I recommended reduction and stabilization of the fracture with a cephalomedullary nail.  This should significantly improve pain control and will allow earlier mobilization and facilitate rehabilitation and nursing care.  Potential for leg length inequality and deep venous thrombosis were discussed.  She also understands there is a small risk of revision surgery.  Other potential complications of surgery and anesthetic were reviewed in detail including but not limited to infection blood loss neurovascular damage sore throat pneumonia brain damage and even death.  She appears understand all matters discussed and wished to proceed.    Pending medical clearance  we anticipate performing her surgery later today.    I appreciate 's referral.      Closed displaced fracture of base of neck of left femur (HCC)    Displaced fracture of left femoral neck (HCC)    Primary hypertension        Leobardo De Luna MD  11/03/21  06:59 CDT

## 2021-11-03 NOTE — PROGRESS NOTES
"      [facility] Medicine Services  INPATIENT PROGRESS NOTE    Length of Stay: 1  Date of Admission: 11/2/2021  Primary Care Physician: Provider, No Known    Subjective   Chief Complaint: Fall, hip pain  HPI: 81 year old female with a history of HTN, HLD, and \"mild CHF\" who presents to the ED with a complaint of left hip pain after a fall at home caused by tripping over a rug. X-ray noted comminuted intertrochanteric fracture.  She is scheduled for repair today.  She notes pain is controlled as long as she does not try to move.     Review of Systems   Constitutional: Negative for chills and fever.   Respiratory: Negative for shortness of breath.    Cardiovascular: Negative for chest pain.   Gastrointestinal: Negative for abdominal pain, nausea and vomiting.   Musculoskeletal: Positive for arthralgias.        All pertinent negatives and positives are as above. All other systems have been reviewed and are negative unless otherwise stated.     Objective    Temp:  [95.5 °F (35.3 °C)-99.2 °F (37.3 °C)] 97.6 °F (36.4 °C)  Heart Rate:  [71-85] 85  Resp:  [15-20] 18  BP: (128-167)/(63-75) 167/75    Physical Exam  Vitals reviewed.   Constitutional:       General: She is not in acute distress.     Appearance: Normal appearance. She is well-developed. She is not diaphoretic.   HENT:      Head: Normocephalic and atraumatic.   Eyes:      Conjunctiva/sclera: Conjunctivae normal.   Cardiovascular:      Rate and Rhythm: Normal rate and regular rhythm.   Pulmonary:      Effort: Pulmonary effort is normal. No respiratory distress.      Breath sounds: Normal breath sounds.   Abdominal:      General: Bowel sounds are normal. There is no distension.      Palpations: Abdomen is soft.      Tenderness: There is no abdominal tenderness.   Musculoskeletal:         General: Deformity (left leg shortened) and signs of injury present.   Skin:     General: Skin is warm and dry.   Neurological:      General: " No focal deficit present.      Mental Status: She is alert and oriented to person, place, and time.             Results Review:  I have reviewed the labs, radiology results, and diagnostic studies.    Laboratory Data:   Results from last 7 days   Lab Units 11/03/21  0525 11/02/21  1451   SODIUM mmol/L 133* 138   POTASSIUM mmol/L 3.8 3.5   CHLORIDE mmol/L 100 103   CO2 mmol/L 24.0 25.0   BUN mg/dL 11 13   CREATININE mg/dL 0.59 0.78   GLUCOSE mg/dL 118* 122*   CALCIUM mg/dL 8.9 9.4   BILIRUBIN mg/dL 1.0 0.6   ALK PHOS U/L 32* 40   ALT (SGPT) U/L 14 17   AST (SGOT) U/L 22 35*   ANION GAP mmol/L 9.0 10.0     Estimated Creatinine Clearance: 54.7 mL/min (by C-G formula based on SCr of 0.59 mg/dL).          Results from last 7 days   Lab Units 11/03/21  0525 11/02/21  1451   WBC 10*3/mm3 6.56 8.71   HEMOGLOBIN g/dL 11.1* 13.0   HEMATOCRIT % 32.8* 39.1   PLATELETS 10*3/mm3 152 158           Culture Data:   No results found for: BLOODCX  No results found for: URINECX  No results found for: RESPCX  No results found for: WOUNDCX  No results found for: STOOLCX  No components found for: BODYFLD    Radiology Data:   Imaging Results (Last 24 Hours)     Procedure Component Value Units Date/Time    CT Lower Extremity Left Without Contrast [[id number]] Collected: 11/02/21 1553     Updated: 11/02/21 1731    Narrative:      PROCEDURE: CT LOWER EXTREMITY WITHOUT IV CONTRAST    TECHNIQUE: Multichannel computed tomography of the left hip  without contrast.  Coronal and sagittal reformatted images were also obtained to  improve fracture detection.    Contrast: None    This exam was performed using radiation doses that are as low as  reasonably achievable (ALARA).  This exam was performed according to our departmental dose  optimization program, which includes automated exposure control,  adjustment of the mA and/or KV according to patient size and/or  use of iterative reconstruction technique.    COMPARISON: Left hip radiographs performed  the same date.    HISTORY: further eval of left hip fracture per ortho, S72.002A  Fracture of unspecified part of neck of left femur, initial  encounter for closed fracture    FINDINGS  There is a comminuted left intertrochanteric fracture to the base  of the femoral neck. There are degenerative changes of the left  hip joint. There is a moderate amount stool in the rectum. There  is a left adnexal cystic structure measuring 2.9 x 2.6 cm.  Recommend pelvic ultrasound in 6-12 weeks.      Impression:      CONCLUSION:   Comminuted left intertrochanteric proximal femoral fracture also  involving the base of the femoral neck.  Degenerative changes of the left hip.  Left adnexal cystic structure measuring 2.9 x 2.6 cm. Recommend  pelvic ultrasound in 6-12 weeks.    Electronically signed by:  Taye Poon MD  11/2/2021 5:30 PM CDT  Workstation: [id number]    XR Chest 1 View [[id number]] Collected: 11/02/21 1426     Updated: 11/02/21 1500    Narrative:      EXAM DESCRIPTION:     XR CHEST 1 VW    CLINICAL HISTORY:     81 years  Female  pre op, S72.002A Fracture of unspecified part  of neck of left femur, initial encounter for closed fracture    COMPARISON:     None available    TECHNIQUE:     One view-AP radiograph the chest    FINDINGS:     The lungs are well-expanded and clear. The cardiac silhouette and  pulmonary vasculature are within normal limits. There are no  pleural effusions.      Impression:          1. No radiographic evidence of acute cardiopulmonary disease.        Electronically signed by:  Lexus Hernandez MD  11/2/2021 3:00 PM  CDT Workstation: [id number]    XR Hip With or Without Pelvis 2 - 3 View Left [[id number]] Collected: 11/02/21 1323     Updated: 11/02/21 1422    Narrative:      AP pelvis single view and left hip two views three views total  November 2, 2021    INDICATION: Patient fell today with acute onset pain    FINDINGS:  Limited study secondary to limitation in patient positioning.  Comminuted  intertrochanteric fracture may extend into the femoral  neck. Multiple butterfly fragments suspected. No definite  anterior pelvic fracture.  Right hip grossly normal.  Degenerative changes lower lumbar region.      Impression:      Limited study.  Comminuted intertrochanteric fracture on the left may extend into  the femoral neck. Clinical correlation recommended. Depending  upon the clinical situation, he scan might be considered for more  complete evaluation.    Electronically signed by:  Kg Villa MD  11/2/2021 2:21 PM  CDT Workstation: [id number]          I have reviewed the patient's current medications.     Assessment/Plan     Active Hospital Problems    Diagnosis    • **Closed displaced fracture of base of neck of left femur (HCC)      Added automatically from request for surgery [id number]     • Displaced fracture of left femoral neck (HCC)    • Primary hypertension        Plan:    Plan for surgical repair today  PT/OT evaluation following surgery  Orthopedics consultation appreciated  Pain control: PRN tylenol, PRN Norco, PRN morphine  Continue Coreg, Lasix, Potassium, and Statin  VTE PPx: deferred to orthopedics    I confirmed that the patient's Advance Care Plan is present, code status is documented, or surrogate decision maker is listed in the patient's medical record.     The patient was evaluated during the global COVID-19 pandemic, and the diagnosis was suspected/considered upon their initial presentation.  Evaluation, treatment, and testing were consistent with current guidelines for patients who present with complaints or symptoms that may be related to COVID-19.          This document has been electronically signed by EUGENE Disla on November 3, 2021 11:40 CDT

## 2021-11-03 NOTE — PLAN OF CARE
Goal Outcome Evaluation:           Progress: no change  Outcome Summary: Pt has sand bags placed per orders. pt has a middleton. NPO since midnight. Will continue to monitor.

## 2021-11-03 NOTE — PLAN OF CARE
Goal Outcome Evaluation:  Plan of Care Reviewed With: patient        Progress: no change  Outcome Summary: surgery today, hip surgery, no fall, able to communicate needs

## 2021-11-03 NOTE — ANESTHESIA PROCEDURE NOTES
Airway  Urgency: elective    Date/Time: 11/3/2021 12:49 PM  Airway not difficult    General Information and Staff    Patient location during procedure: OR  CRNA: Brittanie Sinclair CRNA    Indications and Patient Condition  Indications for airway management: airway protection    Preoxygenated: yes  MILS maintained throughout  Mask difficulty assessment: 0 - not attempted    Final Airway Details  Final airway type: endotracheal airway      Successful airway: ETT  Cuffed: yes   Successful intubation technique: direct laryngoscopy  Endotracheal tube insertion site: oral  Blade: Curtis  Blade size: 3  ETT size (mm): 7.0  Cormack-Lehane Classification: grade I - full view of glottis  Placement verified by: chest auscultation and capnometry   Measured from: lips  ETT/EBT  to lips (cm): 21  Number of attempts at approach: 1  Assessment: lips, teeth, and gum same as pre-op and atraumatic intubation

## 2021-11-03 NOTE — ANESTHESIA PREPROCEDURE EVALUATION
Anesthesia Evaluation     no history of anesthetic complications (NEVER BEEN UNDER GEN):  NPO Solid Status: > 8 hours  NPO Liquid Status: > 2 hours           Airway   Mallampati: I  TM distance: >3 FB  Neck ROM: full  No difficulty expected  Dental    (+) edentulous, lower dentures and upper dentures    Pulmonary    (+) decreased breath sounds,   (-) COPD, asthma, sleep apnea, not a smoker    ROS comment: No radiographic evidence of acute cardiopulmonary disease.   Cardiovascular - normal exam  Exercise tolerance: good (4-7 METS)    ECG reviewed  Patient on routine beta blocker and Beta blocker given within 24 hours of surgery  Rhythm: regular  Rate: normal    (+) hypertension well controlled 2 medications or greater, hyperlipidemia,   (-) valvular problems/murmurs, past MI, dysrhythmias, angina, CHF, murmur, cardiac stents, DVT    ROS comment: Normal sinus rhythm  Left axis deviation (LAFB)  Right bundle branch block  Left ventricular hypertrophy with repolarization abnormality  Anterior infarct , age undetermined  Abnormal ECG  No previous ECGs available    Neuro/Psych  (-) seizures, TIA, CVA, headaches, weakness, numbness, psychiatric history  GI/Hepatic/Renal/Endo    (-) GERD, hepatitis, liver disease, no renal disease, diabetes, no thyroid disorder    Musculoskeletal         ROS comment: Limited study.   Comminuted intertrochanteric fracture on the left may extend into the femoral neck. Clinical correlation recommended. Depending upon the clinical situation, he scan might be considered for more complete evaluation.   Abdominal    Substance History   (-) alcohol use, drug use     OB/GYN          Other   blood dyscrasia anemia,     (-) history of cancer  ROS/Med Hx Other: DENIES LOC WITH FALL                Anesthesia Plan    ASA 3     general   (DENIES CURRENT PAIN)  intravenous induction     Anesthetic plan, all risks, benefits, and alternatives have been provided, discussed and informed consent has been  obtained with: patient.  Use of blood products discussed with patient  Consented to blood products.

## 2021-11-04 LAB
ANION GAP SERPL CALCULATED.3IONS-SCNC: 6 MMOL/L (ref 5–15)
BASOPHILS # BLD AUTO: 0.01 10*3/MM3 (ref 0–0.2)
BASOPHILS NFR BLD AUTO: 0.2 % (ref 0–1.5)
BUN SERPL-MCNC: 8 MG/DL (ref 8–23)
BUN/CREAT SERPL: 11.8 (ref 7–25)
CALCIUM SPEC-SCNC: 8.5 MG/DL (ref 8.6–10.5)
CHLORIDE SERPL-SCNC: 104 MMOL/L (ref 98–107)
CO2 SERPL-SCNC: 25 MMOL/L (ref 22–29)
CREAT SERPL-MCNC: 0.68 MG/DL (ref 0.57–1)
DEPRECATED RDW RBC AUTO: 43.6 FL (ref 37–54)
EOSINOPHIL # BLD AUTO: 0.02 10*3/MM3 (ref 0–0.4)
EOSINOPHIL NFR BLD AUTO: 0.4 % (ref 0.3–6.2)
ERYTHROCYTE [DISTWIDTH] IN BLOOD BY AUTOMATED COUNT: 13.2 % (ref 12.3–15.4)
GFR SERPL CREATININE-BSD FRML MDRD: 83 ML/MIN/1.73
GLUCOSE SERPL-MCNC: 119 MG/DL (ref 65–99)
HCT VFR BLD AUTO: 29.9 % (ref 34–46.6)
HGB BLD-MCNC: 9.9 G/DL (ref 12–15.9)
IMM GRANULOCYTES # BLD AUTO: 0.03 10*3/MM3 (ref 0–0.05)
IMM GRANULOCYTES NFR BLD AUTO: 0.5 % (ref 0–0.5)
LYMPHOCYTES # BLD AUTO: 1.02 10*3/MM3 (ref 0.7–3.1)
LYMPHOCYTES NFR BLD AUTO: 18.5 % (ref 19.6–45.3)
MCH RBC QN AUTO: 29.6 PG (ref 26.6–33)
MCHC RBC AUTO-ENTMCNC: 33.1 G/DL (ref 31.5–35.7)
MCV RBC AUTO: 89.5 FL (ref 79–97)
MONOCYTES # BLD AUTO: 0.57 10*3/MM3 (ref 0.1–0.9)
MONOCYTES NFR BLD AUTO: 10.3 % (ref 5–12)
NEUTROPHILS NFR BLD AUTO: 3.86 10*3/MM3 (ref 1.7–7)
NEUTROPHILS NFR BLD AUTO: 70.1 % (ref 42.7–76)
NRBC BLD AUTO-RTO: 0 /100 WBC (ref 0–0.2)
PLATELET # BLD AUTO: 113 10*3/MM3 (ref 140–450)
PMV BLD AUTO: 10 FL (ref 6–12)
POTASSIUM SERPL-SCNC: 3.5 MMOL/L (ref 3.5–5.2)
RBC # BLD AUTO: 3.34 10*6/MM3 (ref 3.77–5.28)
SODIUM SERPL-SCNC: 135 MMOL/L (ref 136–145)
WBC # BLD AUTO: 5.51 10*3/MM3 (ref 3.4–10.8)

## 2021-11-04 PROCEDURE — 80048 BASIC METABOLIC PNL TOTAL CA: CPT | Performed by: ORTHOPAEDIC SURGERY

## 2021-11-04 PROCEDURE — 99024 POSTOP FOLLOW-UP VISIT: CPT | Performed by: ORTHOPAEDIC SURGERY

## 2021-11-04 PROCEDURE — 25010000002 CEFAZOLIN PER 500 MG: Performed by: ORTHOPAEDIC SURGERY

## 2021-11-04 PROCEDURE — 97162 PT EVAL MOD COMPLEX 30 MIN: CPT

## 2021-11-04 PROCEDURE — 97535 SELF CARE MNGMENT TRAINING: CPT

## 2021-11-04 PROCEDURE — 85025 COMPLETE CBC W/AUTO DIFF WBC: CPT | Performed by: ORTHOPAEDIC SURGERY

## 2021-11-04 PROCEDURE — 97110 THERAPEUTIC EXERCISES: CPT

## 2021-11-04 RX ORDER — SULFAMETHOXAZOLE AND TRIMETHOPRIM 400; 80 MG/1; MG/1
2 TABLET ORAL EVERY 12 HOURS SCHEDULED
Status: COMPLETED | OUTPATIENT
Start: 2021-11-04 | End: 2021-11-06

## 2021-11-04 RX ADMIN — RIVAROXABAN 10 MG: 10 TABLET, FILM COATED ORAL at 08:05

## 2021-11-04 RX ADMIN — POTASSIUM CHLORIDE 20 MEQ: 750 CAPSULE, EXTENDED RELEASE ORAL at 08:04

## 2021-11-04 RX ADMIN — DOCUSATE SODIUM 100 MG: 100 CAPSULE, LIQUID FILLED ORAL at 08:04

## 2021-11-04 RX ADMIN — CEFAZOLIN SODIUM 2 G: 10 INJECTION, POWDER, FOR SOLUTION INTRAVENOUS at 04:34

## 2021-11-04 RX ADMIN — FUROSEMIDE 20 MG: 20 TABLET ORAL at 08:05

## 2021-11-04 RX ADMIN — DOCUSATE SODIUM 100 MG: 100 CAPSULE, LIQUID FILLED ORAL at 20:58

## 2021-11-04 RX ADMIN — DOCUSATE SODIUM 50 MG AND SENNOSIDES 8.6 MG 2 TABLET: 8.6; 5 TABLET, FILM COATED ORAL at 08:04

## 2021-11-04 RX ADMIN — CARVEDILOL 3.12 MG: 3.12 TABLET, FILM COATED ORAL at 17:38

## 2021-11-04 RX ADMIN — SULFAMETHOXAZOLE AND TRIMETHOPRIM 2 TABLET: 400; 80 TABLET ORAL at 08:05

## 2021-11-04 RX ADMIN — SODIUM CHLORIDE 100 ML/HR: 9 INJECTION, SOLUTION INTRAVENOUS at 21:26

## 2021-11-04 RX ADMIN — CARVEDILOL 3.12 MG: 3.12 TABLET, FILM COATED ORAL at 08:05

## 2021-11-04 RX ADMIN — SODIUM CHLORIDE 100 ML/HR: 9 INJECTION, SOLUTION INTRAVENOUS at 02:55

## 2021-11-04 RX ADMIN — SODIUM CHLORIDE 100 ML/HR: 9 INJECTION, SOLUTION INTRAVENOUS at 14:15

## 2021-11-04 RX ADMIN — HYDROCODONE BITARTRATE AND ACETAMINOPHEN 1 TABLET: 5; 325 TABLET ORAL at 21:09

## 2021-11-04 RX ADMIN — HYDROCODONE BITARTRATE AND ACETAMINOPHEN 2 TABLET: 5; 325 TABLET ORAL at 04:30

## 2021-11-04 RX ADMIN — ATORVASTATIN CALCIUM 10 MG: 10 TABLET, FILM COATED ORAL at 08:05

## 2021-11-04 RX ADMIN — DOCUSATE SODIUM 50 MG AND SENNOSIDES 8.6 MG 2 TABLET: 8.6; 5 TABLET, FILM COATED ORAL at 20:58

## 2021-11-04 RX ADMIN — SULFAMETHOXAZOLE AND TRIMETHOPRIM 2 TABLET: 400; 80 TABLET ORAL at 20:58

## 2021-11-04 RX ADMIN — HYDROCODONE BITARTRATE AND ACETAMINOPHEN 1 TABLET: 5; 325 TABLET ORAL at 15:59

## 2021-11-04 NOTE — PLAN OF CARE
Goal Outcome Evaluation:  Plan of Care Reviewed With: patient           Outcome Summary: OT treatment complete. pt was pleasant and cooperative throughout. pt was min A for sup to sit bed mobility, mod-max to return to supine. dependent x 2 to scoot up in bed. min A for rolling L And R. min A for sit to stand transfer with use of RW. min A for toilet transfer. while sitting on toilet pt became dizzy, BP checked and reading 65/44. bed was then moved as close to pt as possible, and pt was returned to bed by squat pivot transfer and max A. min A for functional mobility. BP back up to 117/60 at end of session. RN notified, and present in room. no goals met this date. continue per OT POC.

## 2021-11-04 NOTE — THERAPY TREATMENT NOTE
Patient Name: Corina Lenz  : 1940    MRN: [id number]                              Today's Date: 2021       Admit Date: 2021    Visit Dx:     ICD-10-CM ICD-9-CM   1. Displaced fracture of left femoral neck (HCC)  S72.002A 820.8   2. Closed displaced basicervical fracture of left femur, initial encounter ([facility])  S72.042A 820.03   3. Impaired mobility and ADLs  Z74.09 V49.89    Z78.9      Patient Active Problem List   Diagnosis   • Displaced fracture of left femoral neck (HCC)   • Primary hypertension   • Closed displaced fracture of base of neck of left femur (HCC)     Past Medical History:   Diagnosis Date   • CHF (congestive heart failure) (HCC)    • HLD (hyperlipidemia)    • Hypertension      History reviewed. No pertinent surgical history.   General Information     Row Name 21 0857          OT Time and Intention    Document Type therapy note (daily note)  -ME     Mode of Treatment occupational therapy; co-treatment; physical therapy  -ME     Row Name 21 0857          General Information    Patient Profile Reviewed yes  -ME     Existing Precautions/Restrictions fall  -ME     Row Name 21 0857          Cognition    Orientation Status (Cognition) oriented x 4  -ME     Row Name 21 0857          Safety Issues, Functional Mobility    Safety Issues Affecting Function (Mobility) safety precautions follow-through/compliance; safety precaution awareness  -ME     Impairments Affecting Function (Mobility) balance; endurance/activity tolerance; strength; pain  -ME           User Key  (r) = Recorded By, (t) = Taken By, (c) = Cosigned By    Initials Name Provider Type    ME Yolanda Avina OTR/L Occupational Therapist                 Mobility/ADL's     Row Name 21 0857          Bed Mobility    Bed Mobility supine-sit; sit-supine; rolling right; rolling left; scooting/bridging  -ME     Rolling Left [place] (Bed Mobility) minimum assist (75% patient effort)  -ME     Rolling Right  [place] (Bed Mobility) minimum assist (75% patient effort)  -ME     Scooting/Bridging [place] (Bed Mobility) dependent (less than 25% patient effort); 2 person assist  -ME     Supine-Sit [place] (Bed Mobility) minimum assist (75% patient effort)  -ME     Sit-Supine [place] (Bed Mobility) moderate assist (50% patient effort)  -ME     Assistive Device (Bed Mobility) bed rails; head of bed elevated; draw sheet  -ME     Row Name 11/04/21 0857          Transfers    Transfers sit-stand transfer; toilet transfer; bed-chair transfer  -ME     Comment (Transfers) pt became dizzy while sitting on toilet, BP checked and reading 65/44; bed moved as close to pt and restroom, and squat pivot transferred to bed at that time; required min A to transfer to toilet, but required max A to transfer from toilet  -ME     Bed-Chair [place] (Transfers) maximum assist (25% patient effort)  -ME     Sit-Stand [place] (Transfers) minimum assist (75% patient effort)  -ME     [place] Level (Toilet Transfer) minimum assist (75% patient effort); maximum assist (25% patient effort)  -ME     Row Name 11/04/21 0857          Sit-Stand Transfer    Assistive Device (Sit-Stand Transfers) walker, front-wheeled  -ME     Row Name 11/04/21 0857          Toilet Transfer    Type (Toilet Transfer) sit-stand; stand-sit  -ME     Row Name 11/04/21 0857          Functional Mobility    Functional Mobility- Ind. Level minimum assist (75% patient effort)  -ME     Functional Mobility- Device rolling walker  -ME     Row Name 11/04/21 0857          Activities of Daily Living    BADL Assessment/Intervention toileting  -ME     Row Name 11/04/21 0857          Mobility    Extremity Weight-bearing Status left lower extremity  -ME     Left Lower Extremity (Weight-bearing Status) weight-bearing as tolerated (WBAT)  -ME     Row Name 11/04/21 0857          Toileting Assessment/Training    [place] Level (Toileting) adjust/manage clothing;  perform perineal hygiene; maximum assist (25% patient effort)  -ME           User Key  (r) = Recorded By, (t) = Taken By, (c) = Cosigned By    Initials Name Provider Type    Yolanda [place] OTR/L Occupational Therapist               Obj/Interventions    No documentation.                Goals/Plan     Row Name 11/04/21 0857          Transfer Goal 1 (OT)    Activity/Assistive Device (Transfer Goal 1, OT) toilet  -ME     [place] Level/Cues Needed (Transfer Goal 1, OT) minimum assist (75% or more patient effort)  -ME     Time Frame (Transfer Goal 1, OT) long term goal (LTG); by discharge  -ME     Progress/Outcome (Transfer Goal 1, OT) goal not met  -ME     Row Name 11/04/21 0857          Bathing Goal 1 (OT)    Activity/Device (Bathing Goal 1, OT) lower body bathing  -ME     [place] Level/Cues Needed (Bathing Goal 1, OT) standby assist  -ME     Time Frame (Bathing Goal 1, OT) long term goal (LTG); by discharge  -ME     Progress/Outcomes (Bathing Goal 1, OT) goal not met  -ME     Row Name 11/04/21 0857          Dressing Goal 1 (OT)    Activity/Device (Dressing Goal 1, OT) lower body dressing  -ME     [place]/Cues Needed (Dressing Goal 1, OT) standby assist; minimum assist (75% or more patient effort)  -ME     Time Frame (Dressing Goal 1, OT) long term goal (LTG); by discharge  -ME     Progress/Outcome (Dressing Goal 1, OT) goal not met  -ME     Row Name 11/04/21 0857          Toileting Goal 1 (OT)    [place] Level/Cues Needed (Toileting Goal 1, OT) minimum assist (75% or more patient effort)  -ME     Time Frame (Toileting Goal 1, OT) long term goal (LTG); by discharge  -ME     Progress/Outcome (Toileting Goal 1, OT) goal not met  -ME           User Key  (r) = Recorded By, (t) = Taken By, (c) = Cosigned By    Initials Name Provider Type    Yolanda [place] OTR/L Occupational Therapist               Clinical Impression     Row Name 11/04/21 0857          Pain Assessment    Additional Documentation  Pain Scale: Numbers Pre/Post-Treatment (Group)  -ME     Row Name 11/04/21 0857          Pain Scale: Numbers Pre/Post-Treatment    Pretreatment Pain Rating 0/10 - no pain  -ME     Posttreatment Pain Rating 0/10 - no pain  -ME     Pain Intervention(s) Repositioned; Ambulation/increased activity; Distraction  -ME     Row Name 11/04/21 0857          Plan of Care Review    Plan of Care Reviewed With patient  -ME     Row Name 11/04/21 0857          Therapy Assessment/Plan (OT)    Therapy Frequency (OT) daily  -ME     Row Name 11/04/21 0857          Therapy Plan Review/Discharge Plan (OT)    Anticipated Discharge Disposition (OT) inpatient rehabilitation facility; skilled nursing facility  -ME     Row Name 11/04/21 0857          Vital Signs    Pre Systolic BP Rehab 105  -ME     Pre Treatment Diastolic BP 64  -ME     Intra Systolic BP Rehab 65  -ME     Intra Treatment Diastolic BP 44  -ME     Post Systolic BP Rehab 117  -ME     Post Treatment Diastolic BP 60  -ME     Pretreatment Heart Rate (beats/min) 102  -ME     Posttreatment Heart Rate (beats/min) 93  -ME     Pre SpO2 (%) 98  -ME     O2 Delivery Pre Treatment room air  -ME     Post SpO2 (%) 98  -ME     O2 Delivery Post Treatment room air  -ME     Pre Patient Position Supine  -ME     Intra Patient Position Sitting  -ME     Post Patient Position Supine  -ME     Row Name 11/04/21 0857          Positioning and Restraints    Pre-Treatment Position in bed  -ME     Post Treatment Position bed  -ME     In Bed notified nsg; fowlers; call light within reach; encouraged to call for assist; exit alarm on; SCD pump applied  -ME           User Key  (r) = Recorded By, (t) = Taken By, (c) = Cosigned By    Initials Name Provider Type    Yolanda [place] OTR/L Occupational Therapist               Outcome Measures    No documentation.                 Occupational Therapy Education                 Title: PT OT SLP Therapies (In Progress)     Topic: Occupational Therapy (In Progress)      Point: ADL training (Not Started)     Description:   Instruct learner(s) on proper safety adaptation and remediation techniques during self care or transfers.   Instruct in proper use of assistive devices.              Learner Progress:  Not documented in this visit.          Point: Home exercise program (Not Started)     Description:   Instruct learner(s) on appropriate technique for monitoring, assisting and/or progressing therapeutic exercises/activities.              Learner Progress:  Not documented in this visit.          Point: Precautions (Done)     Description:   Instruct learner(s) on prescribed precautions during self-care and functional transfers.              Learning Progress Summary           Patient Acceptance, E, VU by ME at 11/4/2021 0935    Comment: Educated on OT And POC. Educated to call for assistance. Educated on safety precautions.    Acceptance, E,TB, NR by  at 11/3/2021 1621    Comment: POC, role of OT, transfer training                   Point: Body mechanics (Done)     Description:   Instruct learner(s) on proper positioning and spine alignment during self-care, functional mobility activities and/or exercises.              Learning Progress Summary           Patient Acceptance, E, VU by ME at 11/4/2021 0935    Comment: Educated on OT And POC. Educated to call for assistance. Educated on safety precautions.    Acceptance, E,TB, NR by  at 11/3/2021 1621    Comment: POC, role of OT, transfer training                               User Key     Initials Effective Dates Name Provider Type Discipline    ME 06/16/21 -  Yolanda Avina OTR/L Occupational Therapist OT     06/14/21 -  Fazal Cheatham OT Occupational Therapist OT              OT Recommendation and Plan  Therapy Frequency (OT): daily  Plan of Care Review  Plan of Care Reviewed With: patient  Outcome Summary: OT treatment complete. pt was pleasant and cooperative throughout. pt was min A for sup to sit bed mobility, mod-max to  return to supine. dependent x 2 to scoot up in bed. min A for rolling L And R. min A for sit to stand transfer with use of RW. min A for toilet transfer. while sitting on toilet pt became dizzy, BP checked and reading 65/44. bed was then moved as close to pt as possible, and pt was returned to bed by squat pivot transfer and max A. min A for functional mobility. BP back up to 117/60 at end of session. RN notified, and present in room. no goals met this date. continue per OT POC.     Time Calculation:    Time Calculation- OT     Row Name 11/04/21 0947             Time Calculation- OT    OT Start Time 0857  -ME      OT Stop Time 0925  -ME      OT Time Calculation (min) 28 min  -ME      OT Received On 11/04/21  -ME              Timed Charges    03644 - OT Therapeutic Exercise Minutes 18  -ME      96044 - OT Self Care/Mgmt Minutes 10  -ME              Total Minutes    Timed Charges Total Minutes 28  -ME       Total Minutes 28  -ME            User Key  (r) = Recorded By, (t) = Taken By, (c) = Cosigned By    Initials Name Provider Type    ME Yolanda Avina, OTR/L Occupational Therapist              Therapy Charges for Today     Code Description Service Date Service Provider Modifiers Qty    26652306045 HC OT THER PROC EA 15 MIN 11/4/2021 Yolanda Avina OTR/L GO 1    63166711115 HC OT SELF CARE/MGMT/TRAIN EA 15 MIN 11/4/2021 Yolanda Avina OTR/L GO 1               Yolanda Avina OTR/RAVINDER  11/4/2021

## 2021-11-04 NOTE — PLAN OF CARE
"Goal Outcome Evaluation:  Plan of Care Reviewed With: patient          Problem: Adult Inpatient Plan of Care  Goal: Plan of Care Review  Recent Flowsheet Documentation  Taken 11/4/2021 0897 by Gabriel Salguero, PT  Plan of Care Reviewed With: patient  Outcome Summary: Initial PT evaluation complete.  Patient is alert and cooperative, fairly flat affect.  She requires min Ax1 with bed mobility, transfers and gait, ambulating 5'x1 with FWW to bathroom, cues for proper use of walker and sequencing.  While sitting on toilet patient c/o dizziness, BP 65/44.  Max Ax1 to return to bed, RN and physician present to assess patient.  BP stabilized in supine. Patient may need SNF placement for rehab, pending progress with I/P PT.  Should patient discharge home, she will need a FWW with 5\" wheels and HHPT.  Goals established, continue skilled PT.     "

## 2021-11-04 NOTE — PLAN OF CARE
Problem: Adult Inpatient Plan of Care  Goal: Plan of Care Review  Recent Flowsheet Documentation  Taken 11/4/2021 1614 by Evelyn Campbell RN  Progress: improving  Plan of Care Reviewed With: patient  Outcome Summary: vss at this time. resting between care. working with therapy. voiding adequately. pain controlled with prn meds.   Goal Outcome Evaluation:  Plan of Care Reviewed With: patient        Progress: improving  Outcome Summary: vss at this time. resting between care. working with therapy. voiding adequately. pain controlled with prn meds.

## 2021-11-04 NOTE — PROGRESS NOTES
"    [facility]  INPATIENT PROGRESS NOTE    Length of Stay: 2  Date of Admission: 11/2/2021  Primary Care Physician: Provider, No Known    Subjective   Chief Complaint: dizzy   HPI:  81 year old female with past medical history of HTN, HLD who presented on 11/2/2021 after suffering a fall that resulted in left hip fracture.  Fracture was repaired on 11/3 by Dr. De Luna.  During today's visit, patient reports she had a \"dizzy spell\" when getting out of bed for the first time post op with PT.  Nursing reports her blood pressure dropped to 60's systolic but recovered to 110-120 after returning to bed.  Patient reports she has not been eating or drinking as much since surgery and concern is for hypovolemia.  She otherwise denies complaints aside from mild incisional pain to left hip.     Review of Systems   Constitutional: Negative for chills, fatigue and fever.   HENT: Negative for congestion, rhinorrhea and sore throat.    Respiratory: Negative for cough, chest tightness, shortness of breath and wheezing.    Cardiovascular: Negative for chest pain, palpitations and leg swelling.   Gastrointestinal: Negative for abdominal pain, diarrhea, nausea and vomiting.   Musculoskeletal: Negative for back pain and neck pain.   Skin: Negative for pallor.   Neurological: Positive for dizziness. Negative for weakness and headaches.   Psychiatric/Behavioral: Negative for confusion. The patient is not nervous/anxious.         All pertinent negatives and positives are as above. All other systems have been reviewed and are negative unless otherwise stated.     Objective    Temp:  [96.6 °F (35.9 °C)-99.7 °F (37.6 °C)] 99 °F (37.2 °C)  Heart Rate:  [62-92] 91  Resp:  [13-20] 18  BP: (102-134)/(53-74) 116/63    Physical Exam  Vitals and nursing note reviewed.   Constitutional:       General: She is not in acute distress.     Appearance: Normal appearance. She is not ill-appearing. "   HENT:      Head: Normocephalic and atraumatic.      Right Ear: External ear normal.      Left Ear: External ear normal.      Nose: Nose normal.      Mouth/Throat:      Mouth: Mucous membranes are moist.      Pharynx: Oropharynx is clear.   Eyes:      General: No scleral icterus.        Right eye: No discharge.         Left eye: No discharge.      Conjunctiva/sclera: Conjunctivae normal.   Cardiovascular:      Rate and Rhythm: Normal rate and regular rhythm.      Pulses: Normal pulses.      Heart sounds: Normal heart sounds. No murmur heard.  No friction rub. No gallop.    Pulmonary:      Effort: Pulmonary effort is normal. No respiratory distress.      Breath sounds: Normal breath sounds. No stridor. No wheezing, rhonchi or rales.   Abdominal:      General: Bowel sounds are normal. There is no distension.      Palpations: Abdomen is soft.      Tenderness: There is no abdominal tenderness.   Musculoskeletal:         General: No swelling. Normal range of motion.      Cervical back: Normal range of motion and neck supple.   Skin:     General: Skin is warm and dry.      Comments: Left hip dressing is clean, dry, intact   Neurological:      General: No focal deficit present.      Mental Status: She is alert and oriented to person, place, and time.   Psychiatric:         Mood and Affect: Mood normal.         Behavior: Behavior normal.             Results Review:  I have reviewed the labs, radiology results, and diagnostic studies.    Laboratory Data:   Results from last 7 days   Lab Units 11/04/21  0453 11/03/21  0525 11/02/21  1451   SODIUM mmol/L 135* 133* 138   POTASSIUM mmol/L 3.5 3.8 3.5   CHLORIDE mmol/L 104 100 103   CO2 mmol/L 25.0 24.0 25.0   BUN mg/dL 8 11 13   CREATININE mg/dL 0.68 0.59 0.78   GLUCOSE mg/dL 119* 118* 122*   CALCIUM mg/dL 8.5* 8.9 9.4   BILIRUBIN mg/dL  --  1.0 0.6   ALK PHOS U/L  --  32* 40   ALT (SGPT) U/L  --  14 17   AST (SGOT) U/L  --  22 35*   ANION GAP mmol/L 6.0 9.0 10.0     Estimated  Creatinine Clearance: 54.7 mL/min (by C-G formula based on SCr of 0.68 mg/dL).          Results from last 7 days   Lab Units 11/04/21  0453 11/03/21  0525 11/02/21  1451   WBC 10*3/mm3 5.51 6.56 8.71   HEMOGLOBIN g/dL 9.9* 11.1* 13.0   HEMATOCRIT % 29.9* 32.8* 39.1   PLATELETS 10*3/mm3 113* 152 158           Culture Data:   No results found for: BLOODCX  No results found for: URINECX  No results found for: RESPCX  No results found for: WOUNDCX  No results found for: STOOLCX  No components found for: BODYFLD    Radiology Data:   Imaging Results (Last 24 Hours)     Procedure Component Value Units Date/Time    XR Hip With or Without Pelvis 2 - 3 View Left [[id number]] Collected: 11/03/21 1432     Updated: 11/03/21 1516    Narrative:      Procedure:  Left hip        Indication:  Postop fracture internal fixation..    Technique:  2 views   .    Prior relevant exam: Left hip November 2, 2021..    Left hip fracture without angulation or deformity. Alignment is  significantly improved in comparison with prereduction views.  Alignment is maintained by a lag screw through  the femoral neck  attached to a short intramedullary maryanne through the proximal  femoral shaft.     Skin staples are noted in the lateral aspect left hip and  proximal femur at the incision sites.      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      Impression:      As above.    Electronically signed by:  Timo Looney MD  11/3/2021 3:14 PM CDT  Workstation: [id number]    FL C Arm During Surgery [[id number]] Resulted: 11/03/21 1459     Updated: 11/03/21 1459          I have reviewed the patient's current medications.     Assessment/Plan     Active Hospital Problems    Diagnosis    • **Closed displaced fracture of base of neck of left femur (HCC)      Added automatically from request for surgery [id number]     • Displaced fracture of left femoral neck (HCC)    • Primary hypertension        Plan:    1. Left femur fracture: s/p surgical  repair with Dr. De Luna on 11/3.  Continue PT/OT.  Continue pain control.  Continue Xarelto for DVT prevention.   2. Dizziness/orthostasis: continue IV fluids, encourage PO intake.  Patient has already received morning Coreg and Lasix dosing.  Lasix placed on hold and will stop beta blocker if hypotension continues.    3. HLD: continue statin.    4. Acute cystitis: currently on Bactrim.  Awaiting cultures.     Discharge Planning: awaiting further PT/OT evaluations to determine best rehab course.  Patient prefers home with home health Pt/Ot with family to assist her as needed.     I confirmed that the patient's Advance Care Plan is present, code status is documented, or surrogate decision maker is listed in the patient's medical record.            This document has been electronically signed by EUGENE Juarez on November 4, 2021 13:48 CDT

## 2021-11-04 NOTE — PLAN OF CARE
Goal Outcome Evaluation:   Patient received lying in bed awake alert and oriented. VS taken stable. IV fluids infusing well. Voiding QS via bedpan. Medicated X1 for pain as ordered. With good results. Rested quietly throughout the night. Condition remains stable. Will continue to monitor.

## 2021-11-04 NOTE — PROGRESS NOTES
ORTHOPEDIC PROGRESS NOTE:    Name:  Corina Lenz  Date:    11/4/2021  Date of admission:  11/2/2021    Post op day:  1 Day Post-Op  Procedure:    Procedure(s) (LRB):  HIP TROCHANTERIC NAILING SHORT WITH INTRAMEDULLARY HIP SCREW (Left)    Subjective: She is having little pain at rest.  She has been up with the physical therapist.    Vitals:    Vitals:    11/04/21 1132   BP: 116/63   Pulse: 91   Resp: 18   Temp: 99 °F (37.2 °C)   SpO2: 94%       Exam: She is alert calm and in no apparent distress.    There is mild bloody staining of the dressings over her proximal wound.  The thighs and calves are soft.  Sciatic motor and sensory are normal.  The heels are nontender.    Hemoglobin day is 9.9.  Urinalysis showed pyuria with positive nitrite and leukocyte esterase.    Lab Results (last 24 hours)     Procedure Component Value Units Date/Time    Basic Metabolic Panel [[id number]]  (Abnormal) Collected: 11/04/21 0453    Specimen: Blood Updated: 11/04/21 0537     Glucose 119 mg/dL      BUN 8 mg/dL      Creatinine 0.68 mg/dL      Sodium 135 mmol/L      Potassium 3.5 mmol/L      Chloride 104 mmol/L      CO2 25.0 mmol/L      Calcium 8.5 mg/dL      eGFR Non African Amer 83 mL/min/1.73      BUN/Creatinine Ratio 11.8     Anion Gap 6.0 mmol/L     Narrative:      GFR Normal >60  Chronic Kidney Disease <60  Kidney Failure <15      CBC & Differential [[id number]]  (Abnormal) Collected: 11/04/21 0453    Specimen: Blood Updated: 11/04/21 0519    Narrative:      The following orders were created for panel order CBC & Differential.  Procedure                               Abnormality         Status                     ---------                               -----------         ------                     CBC Auto Differential[[id number]]        Abnormal            Final result               Scan Slide[[id number]]                                                                    Please view results for these tests on the individual orders.     CBC Auto Differential [[id number]]  (Abnormal) Collected: 11/04/21 0453    Specimen: Blood Updated: 11/04/21 0519     WBC 5.51 10*3/mm3      RBC 3.34 10*6/mm3      Hemoglobin 9.9 g/dL      Hematocrit 29.9 %      MCV 89.5 fL      MCH 29.6 pg      MCHC 33.1 g/dL      RDW 13.2 %      RDW-SD 43.6 fl      MPV 10.0 fL      Platelets 113 10*3/mm3      Neutrophil % 70.1 %      Lymphocyte % 18.5 %      Monocyte % 10.3 %      Eosinophil % 0.4 %      Basophil % 0.2 %      Immature Grans % 0.5 %      Neutrophils, Absolute 3.86 10*3/mm3      Lymphocytes, Absolute 1.02 10*3/mm3      Monocytes, Absolute 0.57 10*3/mm3      Eosinophils, Absolute 0.02 10*3/mm3      Basophils, Absolute 0.01 10*3/mm3      Immature Grans, Absolute 0.03 10*3/mm3      nRBC 0.0 /100 WBC     Urinalysis, Microscopic Only - Urine, Catheter [[id number]]  (Abnormal) Collected: 11/03/21 1643    Specimen: Urine, Catheter Updated: 11/03/21 1653     RBC, UA 3-5 /HPF      WBC, UA 13-20 /HPF      Bacteria, UA 1+ /HPF      Squamous Epithelial Cells, UA None Seen /HPF      Hyaline Casts, UA 7-12 /LPF      Methodology Automated Microscopy    Urinalysis With Culture If Indicated - Urine, Catheter [[id number]]  (Abnormal) Collected: 11/03/21 1643    Specimen: Urine, Catheter Updated: 11/03/21 1653     Color, UA Yellow     Appearance, UA Cloudy     pH, UA <=5.0     Specific Gravity, UA 1.027     Glucose, UA Negative     Ketones, UA Negative     Bilirubin, UA Negative     Blood, UA Negative     Protein, UA Trace     Leuk Esterase, UA Small (1+)     Nitrite, UA Positive     Urobilinogen, UA 0.2 E.U./dL    Urine Culture - Urine, Urine, Catheter [[id number]] Collected: 11/03/21 1643    Specimen: Urine, Catheter Updated: 11/03/21 1653          ASSESSMENT: 1 satisfactory progress following fixation left hip fracture.  2 possible urinary tract infection.    She has been placed on Bactrim.  She may continue physical therapy weightbearing as tolerated.  For DVT prophylaxis I would like  to maintain her on Xarelto during this admission and transition to aspirin 81 mg twice a day after discharge.  Options after discharge include Home with home health care and home physical therapy versus short-term rehab placement.  She would like to go directly home from the hospital if possible.      Principal Problem:    Closed displaced fracture of base of neck of left femur (HCC)  Active Problems:    Displaced fracture of left femoral neck (HCC)    Primary hypertension        PLAN: Discharge when safe with ambulation.  Remove staples at about 10 days postop with follow-up x-rays in my office at about 2 weeks postop.  Begin daily dressing changes tomorrow.      Leobardo De Luna MD  11/04/21  12:58 CDT

## 2021-11-04 NOTE — THERAPY EVALUATION
Patient Name: Corina Lenz  : 1940    MRN: [id number]                              Today's Date: 2021       Admit Date: 2021    Visit Dx:     ICD-10-CM ICD-9-CM   1. Displaced fracture of left femoral neck (HCC)  S72.002A 820.8   2. Closed displaced basicervical fracture of left femur, initial encounter ([facility])  S72.042A 820.03   3. Impaired mobility and ADLs  Z74.09 V49.89    Z78.9    4. Impaired functional mobility, balance, gait, and endurance  Z74.09 V49.89     Patient Active Problem List   Diagnosis   • Displaced fracture of left femoral neck (HCC)   • Primary hypertension   • Closed displaced fracture of base of neck of left femur (HCC)     Past Medical History:   Diagnosis Date   • CHF (congestive heart failure) (HCC)    • HLD (hyperlipidemia)    • Hypertension      History reviewed. No pertinent surgical history.   General Information     Row Name 21          Physical Therapy Time and Intention    Document Type evaluation  -CZ     Mode of Treatment individual therapy; physical therapy  -CZ     Row Name 21 0840          General Information    Patient Profile Reviewed yes  -CZ     Prior Level of Function independent:; all household mobility  -CZ     Existing Precautions/Restrictions fall  -CZ     Barriers to Rehab medically complex  -CZ     Row Name 21 0840          Living Environment    Lives With other relative(s)  -CZ     Row Name 21 0840          Home Main Entrance    Number of Stairs, Main Entrance none  -CZ     Row Name 2140          Stairs Within Home, Primary    Stairs, Within Home, Primary (I) without an AD, no FWW at home.  -CZ     Number of Stairs, Within Home, Primary none  -CZ     Row Name 21 0840          Cognition    Orientation Status (Cognition) oriented x 4  -CZ     Row Name 2140          Safety Issues, Functional Mobility    Impairments Affecting Function (Mobility) strength; endurance/activity tolerance; balance; range of  motion (ROM); pain  -CZ           User Key  (r) = Recorded By, (t) = Taken By, (c) = Cosigned By    Initials Name Provider Type    Gabriel Mcqueen, PT Physical Therapist               Mobility     Row Name 11/04/21 0840          Bed Mobility    Bed Mobility supine-sit; scooting/bridging; rolling right; rolling left  -CZ     Rolling Left [place] (Bed Mobility) minimum assist (75% patient effort)  -CZ     Rolling Right [place] (Bed Mobility) minimum assist (75% patient effort)  -CZ     Scooting/Bridging [place] (Bed Mobility) dependent (less than 25% patient effort); 2 person assist  -CZ     Supine-Sit [place] (Bed Mobility) minimum assist (75% patient effort)  -CZ     Sit-Supine [place] (Bed Mobility) moderate assist (50% patient effort)  -CZ     Assistive Device (Bed Mobility) bed rails; head of bed elevated; draw sheet  -CZ     Row Name 11/04/21 0840          Bed-Chair Transfer    Bed-Chair [place] (Transfers) maximum assist (25% patient effort)  -CZ     Row Name 11/04/21 0840          Sit-Stand Transfer    Sit-Stand [place] (Transfers) minimum assist (75% patient effort)  -CZ     Row Name 11/04/21 0840          Gait/Stairs (Locomotion)    [place] Level (Gait) minimum assist (75% patient effort); maximum assist (25% patient effort)  -CZ     Distance in Feet (Gait) 5'x1.  -CZ     Comment (Gait/Stairs) Cues for sequencing and use of walker.  C/o dizziness while seated on toilet.  Max Ax1 to stand pivot and ambulate short distance to bed, BP 65/44.  -CZ     Row Name 11/04/21 0840          Mobility    Extremity Weight-bearing Status left lower extremity  -CZ     Left Lower Extremity (Weight-bearing Status) weight-bearing as tolerated (WBAT)  -CZ           User Key  (r) = Recorded By, (t) = Taken By, (c) = Cosigned By    Initials Name Provider Type    Gabriel Mcqueen, PT Physical Therapist               Obj/Interventions     Row Name 11/04/21 0840          Range of Motion  Comprehensive    General Range of Motion bilateral lower extremity ROM WFL  -CZ     Row Name 11/04/21 0840          Strength Comprehensive (MMT)    General Manual Muscle Testing (MMT) Assessment other (see comments)  -CZ     Comment, General Manual Muscle Testing (MMT) Assessment RLE: 3/5, LLE: 3-/5 grossly.  -CZ     Row Name 11/04/21 0840          Sensory Assessment (Somatosensory)    Sensory Assessment (Somatosensory) LE sensation intact  -CZ           User Key  (r) = Recorded By, (t) = Taken By, (c) = Cosigned By    Initials Name Provider Type    CZ Gabriel Salguero, PT Physical Therapist               Goals/Plan     Row Name 11/04/21 0840          Bed Mobility Goal 1 (PT)    Activity/Assistive Device (Bed Mobility Goal 1, PT) sit to supine/supine to sit  -CZ     [place] Level/Cues Needed (Bed Mobility Goal 1, PT) independent  -CZ     Time Frame (Bed Mobility Goal 1, PT) by discharge  -CZ     Strategies/Barriers (Bed Mobility Goal 1, PT) L hip ORIF, WBAT.  -CZ     Progress/Outcomes (Bed Mobility Goal 1, PT) goal not met  -CZ     Row Name 11/04/21 0840          Transfer Goal 1 (PT)    Activity/Assistive Device (Transfer Goal 1, PT) sit-to-stand/stand-to-sit; bed-to-chair/chair-to-bed  -CZ     [place] Level/Cues Needed (Transfer Goal 1, PT) modified independence  -CZ     Time Frame (Transfer Goal 1, PT) by discharge  -CZ     Strategies/Barriers (Transfers Goal 1, PT) L hip ORIF, WBAT.  -CZ     Progress/Outcome (Transfer Goal 1, PT) goal not met  -CZ     Row Name 11/04/21 0840          Gait Training Goal 1 (PT)    Activity/Assistive Device (Gait Training Goal 1, PT) walker, rolling  -CZ     [place] Level (Gait Training Goal 1, PT) modified independence  -CZ     Distance (Gait Training Goal 1, PT) 100' x 1.  -CZ     Time Frame (Gait Training Goal 1, PT) by discharge  -CZ     Strategies/Barriers (Gait Training Goal 1, PT) L hip ORIF, WBAT. Orthostatic hypotension during evaluation.  -CZ      "Progress/Outcome (Gait Training Goal 1, PT) goal not met  -           User Key  (r) = Recorded By, (t) = Taken By, (c) = Cosigned By    Initials Name Provider Type    Gabriel Mcqueen, PT Physical Therapist               Clinical Impression     Row Name 11/04/21 0840          Pain    Additional Documentation Pain Scale: Numbers Pre/Post-Treatment (Group)  -YUE     Row Name 11/04/21 0840          Pain Scale: Numbers Pre/Post-Treatment    Pretreatment Pain Rating 0/10 - no pain  -CZ     Posttreatment Pain Rating 0/10 - no pain  -CZ     Pain Intervention(s) Repositioned; Ambulation/increased activity; Distraction  -YUE     Row Name 11/04/21 0840          Plan of Care Review    Plan of Care Reviewed With patient  -CZ     Outcome Summary Initial PT evaluation complete.  Patient is alert and cooperative, fairly flat affect.  She requires min Ax1 with bed mobility, transfers and gait, ambulating 5'x1 with FWW to bathroom, cues for proper use of walker and sequencing.  While sitting on toilet patient c/o dizziness, BP 65/44.  Max Ax1 to return to bed, RN and physician present to assess patient.  BP stabilized in supine. Patient may need SNF placement for rehab, pending progress with I/P PT.  Should patient discharge home, she will need a FWW with 5\" wheels and HHPT.  Goals established, continue skilled PT.  -YUE     Row Name 11/04/21 0840          Therapy Assessment/Plan (PT)    Rehab Potential (PT) good, to achieve stated therapy goals  -     Criteria for Skilled Interventions Met (PT) yes; skilled treatment is necessary  -     Row Name 11/04/21 0840          Vital Signs    Pre Systolic BP Rehab 105  -CZ     Pre Treatment Diastolic BP 64  -CZ     Intra Systolic BP Rehab 65  -CZ     Intra Treatment Diastolic BP 44  -CZ     Post Systolic BP Rehab 117  -CZ     Post Treatment Diastolic BP 60  -CZ     Pretreatment Heart Rate (beats/min) 102  -CZ     Posttreatment Heart Rate (beats/min) 93  -CZ     Pre SpO2 (%) 98  -CZ     " O2 Delivery Pre Treatment room air  -CZ     Post SpO2 (%) 98  -CZ     O2 Delivery Post Treatment room air  -CZ     Pre Patient Position Supine  -CZ     Intra Patient Position Sitting  -CZ     Post Patient Position Supine  -CZ     Row Name 11/04/21 0840          Positioning and Restraints    Pre-Treatment Position in bed  -CZ     Post Treatment Position bed  -CZ     In Bed supine; call light within reach; encouraged to call for assist; exit alarm on; SCD pump applied  -CZ           User Key  (r) = Recorded By, (t) = Taken By, (c) = Cosigned By    Initials Name Provider Type    Gabriel Mcqueen, PT Physical Therapist               Outcome Measures     Row Name 11/04/21 0840          How much help from another person do you currently need...    Turning from your back to your side while in flat bed without using bedrails? 3  -CZ     Moving from lying on back to sitting on the side of a flat bed without bedrails? 3  -CZ     Moving to and from a bed to a chair (including a wheelchair)? 3  -CZ     Standing up from a chair using your arms (e.g., wheelchair, bedside chair)? 3  -CZ     Climbing 3-5 steps with a railing? 2  -CZ     To walk in hospital room? 3  -CZ     AM-PAC 6 Clicks Score (PT) 17  -CZ     Row Name 11/04/21 0840          Functional Assessment    Outcome Measure Options AM-PAC 6 Clicks Basic Mobility (PT)  -CZ           User Key  (r) = Recorded By, (t) = Taken By, (c) = Cosigned By    Initials Name Provider Type    Gabriel Mcqueen, PT Physical Therapist                             Physical Therapy Education                 Title: PT OT SLP Therapies (In Progress)     Topic: Physical Therapy (In Progress)     Point: Mobility training (Done)     Learning Progress Summary           Patient Acceptance, E, VU,NR by  at 11/4/2021 1001    Comment: PT POC, rehab process, use of walker.                   Point: Home exercise program (Not Started)     Learner Progress:  Not documented in this visit.           "Point: Body mechanics (Not Started)     Learner Progress:  Not documented in this visit.          Point: Precautions (Not Started)     Learner Progress:  Not documented in this visit.                      User Key     Initials Effective Dates Name Provider Type Discipline     06/16/21 -  Gabriel Salguero, PT Physical Therapist PT              PT Recommendation and Plan  Planned Therapy Interventions (PT): balance training, bed mobility training, gait training, patient/family education, transfer training, ROM (range of motion), stair training, strengthening, stretching  Plan of Care Reviewed With: patient  Outcome Summary: Initial PT evaluation complete.  Patient is alert and cooperative, fairly flat affect.  She requires min Ax1 with bed mobility, transfers and gait, ambulating 5'x1 with FWW to bathroom, cues for proper use of walker and sequencing.  While sitting on toilet patient c/o dizziness, BP 65/44.  Max Ax1 to return to bed, RN and physician present to assess patient.  BP stabilized in supine. Patient may need SNF placement for rehab, pending progress with I/P PT.  Should patient discharge home, she will need a FWW with 5\" wheels and HHPT.  Goals established, continue skilled PT.     Time Calculation:    PT Charges     Row Name 11/04/21 1006             Time Calculation    Start Time 0840  -CZ      Stop Time 0929  -CZ      Time Calculation (min) 49 min  -CZ      PT Received On 11/04/21  -CZ      PT Goal Re-Cert Due Date 11/17/21  -CZ              Untimed Charges    PT Eval/Re-eval Minutes 49  -CZ              Total Minutes    Untimed Charges Total Minutes 49  -CZ       Total Minutes 49  -CZ            User Key  (r) = Recorded By, (t) = Taken By, (c) = Cosigned By    Initials Name Provider Type    CZ Gabriel Salguero, PT Physical Therapist              Therapy Charges for Today     Code Description Service Date Service Provider Modifiers Qty    [id number] HC PT EVAL MOD COMPLEXITY 3 11/4/2021 Noemy, " Gabriel VALDES, PT GP 1          PT G-Codes  Outcome Measure Options: AM-PAC 6 Clicks Basic Mobility (PT)  AM-PAC 6 Clicks Score (PT): 17  AM-PAC 6 Clicks Score (OT): 16    Gabriel Salguero, PT  11/4/2021

## 2021-11-05 ENCOUNTER — HOME HEALTH ADMISSION (OUTPATIENT)
Dept: HOME HEALTH SERVICES | Facility: HOME HEALTHCARE | Age: 81
End: 2021-11-05

## 2021-11-05 LAB — BACTERIA SPEC AEROBE CULT: NO GROWTH

## 2021-11-05 PROCEDURE — 97530 THERAPEUTIC ACTIVITIES: CPT

## 2021-11-05 PROCEDURE — 97110 THERAPEUTIC EXERCISES: CPT

## 2021-11-05 PROCEDURE — 99024 POSTOP FOLLOW-UP VISIT: CPT | Performed by: ORTHOPAEDIC SURGERY

## 2021-11-05 PROCEDURE — 97116 GAIT TRAINING THERAPY: CPT

## 2021-11-05 PROCEDURE — 94799 UNLISTED PULMONARY SVC/PX: CPT

## 2021-11-05 RX ADMIN — CARVEDILOL 3.12 MG: 3.12 TABLET, FILM COATED ORAL at 17:41

## 2021-11-05 RX ADMIN — SODIUM CHLORIDE 100 ML/HR: 9 INJECTION, SOLUTION INTRAVENOUS at 07:02

## 2021-11-05 RX ADMIN — HYDROCODONE BITARTRATE AND ACETAMINOPHEN 1 TABLET: 5; 325 TABLET ORAL at 04:44

## 2021-11-05 RX ADMIN — SODIUM CHLORIDE, PRESERVATIVE FREE 10 ML: 5 INJECTION INTRAVENOUS at 21:00

## 2021-11-05 RX ADMIN — DOCUSATE SODIUM 100 MG: 100 CAPSULE, LIQUID FILLED ORAL at 20:53

## 2021-11-05 RX ADMIN — RIVAROXABAN 10 MG: 10 TABLET, FILM COATED ORAL at 09:31

## 2021-11-05 RX ADMIN — SODIUM CHLORIDE, PRESERVATIVE FREE 3 ML: 5 INJECTION INTRAVENOUS at 21:00

## 2021-11-05 RX ADMIN — HYDROCODONE BITARTRATE AND ACETAMINOPHEN 2 TABLET: 5; 325 TABLET ORAL at 22:54

## 2021-11-05 RX ADMIN — DOCUSATE SODIUM 100 MG: 100 CAPSULE, LIQUID FILLED ORAL at 09:31

## 2021-11-05 RX ADMIN — CARVEDILOL 3.12 MG: 3.12 TABLET, FILM COATED ORAL at 09:31

## 2021-11-05 RX ADMIN — SULFAMETHOXAZOLE AND TRIMETHOPRIM 2 TABLET: 400; 80 TABLET ORAL at 20:53

## 2021-11-05 RX ADMIN — ATORVASTATIN CALCIUM 10 MG: 10 TABLET, FILM COATED ORAL at 09:31

## 2021-11-05 RX ADMIN — DOCUSATE SODIUM 50 MG AND SENNOSIDES 8.6 MG 2 TABLET: 8.6; 5 TABLET, FILM COATED ORAL at 09:31

## 2021-11-05 RX ADMIN — HYDROCODONE BITARTRATE AND ACETAMINOPHEN 2 TABLET: 5; 325 TABLET ORAL at 14:17

## 2021-11-05 RX ADMIN — DOCUSATE SODIUM 50 MG AND SENNOSIDES 8.6 MG 2 TABLET: 8.6; 5 TABLET, FILM COATED ORAL at 20:53

## 2021-11-05 RX ADMIN — SULFAMETHOXAZOLE AND TRIMETHOPRIM 2 TABLET: 400; 80 TABLET ORAL at 09:31

## 2021-11-05 RX ADMIN — POTASSIUM CHLORIDE 20 MEQ: 750 CAPSULE, EXTENDED RELEASE ORAL at 09:31

## 2021-11-05 NOTE — PAYOR COMM NOTE
"      Tisha Rodriguez RN [facility]  [phone]     Phone  [phone]      Fax  Cont stay review      Ministerio Clarke (81 y.o. Female)             Date of Birth Social Security Number Address Home Phone MRN    [id number]  [id number]  [facility] [address] [phone] [id number]    Mandaen Marital Status             None        Admission Date Admission Type Admitting Provider Attending Provider Department, Room/Bed    11/2/21 Emergency Camacho Cruz MD Eben[place], Sotonte E, MD [facility] [address] [place], 430/1    Discharge Date Discharge Disposition Discharge Destination                         Attending Provider: Camacho Cruz MD    Allergies: No Known Allergies    Isolation: None   Infection: None   Code Status: CPR   Advance Care Planning Activity    Ht: 162.6 cm (64\")   Wt: 74.2 kg (163 lb 9.6 oz)    Admission Cmt: None   Principal Problem: Closed displaced fracture of base of neck of left femur (HCC) [S72.042A] More...                 Active Insurance as of 11/2/2021     Primary Coverage     Payor Plan Insurance Group Employer/Plan Group    ANTHEM MEDICARE REPLACEMENT ANTHEM MEDICARE ADVANTAGE [id number]     Payor Plan Address Payor Plan Phone Number Payor Plan Fax Number Effective Dates    [address] [phone]  1/1/2021 - None Entered    [facility] [id number]       Subscriber Name Subscriber Birth Date Member ID       MINISTERIO CLARKE 1940 [id number]                 Emergency Contacts      (Rel.) Home Phone Work Phone Mobile Phone    Donya Worley (Relative) [phone] -- [phone]            Vital Signs (last day)     Date/Time Temp Temp src Pulse Resp BP Patient Position SpO2    11/05/21 0928 97.5 (36.4) Oral 87 16 113/65 Lying 96    11/05/21 0335 97.8 (36.6) Oral 85 18 138/70 Lying 96    11/04/21 2338 98.9 (37.2) Oral 84 18 119/53 Lying 95    11/04/21 1955 99 (37.2) Oral 80 20 127/65 Lying 96    " 11/04/21 1646 98.8 (37.1) Oral 88 18 118/64 Lying 95    11/04/21 1132 99 (37.2) Oral 91 18 116/63 Lying 94    11/04/21 0729 99 (37.2) Oral 83 16 129/61 Lying 93    11/04/21 0336 99.7 (37.6) Oral 87 18 114/54 Lying 96    11/04/21 0018 99.3 (37.4) Oral 92 18 115/53 Lying 96          Oxygen Therapy (last day)     Date/Time SpO2 Device (Oxygen Therapy) Flow (L/min) Oxygen Concentration (%) ETCO2 (mmHg)    11/05/21 0928 96 room air -- -- --    11/05/21 0335 96 -- -- -- --    11/04/21 2338 95 room air -- -- --    11/04/21 2000 -- room air -- -- --    11/04/21 1955 96 room air -- -- --    11/04/21 1646 95 -- -- -- --    11/04/21 1132 94 -- -- -- --    11/04/21 0729 93 room air -- -- --    11/04/21 0336 96 -- -- -- --    11/04/21 0018 96 -- -- -- --          Current Facility-Administered Medications   Medication Dose Route Frequency Provider Last Rate Last Admin   • acetaminophen (TYLENOL) tablet 650 mg  650 mg Oral Q4H PRN Leobardo De Luna MD       • atorvastatin (LIPITOR) tablet 10 mg  10 mg Oral Daily Leobardo De Luna MD   10 mg at 11/05/21 0931   • sennosides-docusate (PERICOLACE) 8.6-50 MG per tablet 2 tablet  2 tablet Oral BID Leobardo De Luna MD   2 tablet at 11/05/21 0931    And   • polyethylene glycol (MIRALAX) packet 17 g  17 g Oral Daily PRN Leobardo De Luna MD        And   • bisacodyl (DULCOLAX) EC tablet 5 mg  5 mg Oral Daily PRN Leobardo De Luna MD        And   • bisacodyl (DULCOLAX) suppository 10 mg  10 mg Rectal Daily PRN Leobardo De Luna MD       • carvedilol (COREG) tablet 3.125 mg  3.125 mg Oral BID With Meals Leobardo De Luna MD   3.125 mg at 11/05/21 0931   • docusate sodium (COLACE) capsule 100 mg  100 mg Oral BID Leobardo De Luna MD   100 mg at 11/05/21 0931   • HYDROcodone-acetaminophen (NORCO) 5-325 MG per tablet 1 tablet  1 tablet Oral Q4H Leobardo Mendieta MD   1 tablet at 11/05/21 0444   • HYDROcodone-acetaminophen (NORCO) 5-325 MG per tablet 2 tablet  2  tablet Oral Q4H PRN Leobardo De Luna MD   2 tablet at 11/04/21 0430   • morphine injection 2 mg  2 mg Intravenous Q2H PRN Leobardo De Luna MD        And   • naloxone (NARCAN) injection 0.4 mg  0.4 mg Intravenous Q5 Min PRN Leobardo De Luna MD       • morphine injection 4 mg  4 mg Intravenous Q2H PRN Leobardo De Luna MD        And   • naloxone (NARCAN) injection 0.4 mg  0.4 mg Intravenous Q5 Min PRN Leobardo De Luna MD       • ondansetron (ZOFRAN) tablet 4 mg  4 mg Oral Q6H PRN Leobardo De Luna MD        Or   • ondansetron (ZOFRAN) injection 4 mg  4 mg Intravenous Q6H PRN Leobardo De Luna MD       • potassium chloride (MICRO-K) CR capsule 20 mEq  20 mEq Oral Daily Leobardo De Luna MD   20 mEq at 11/05/21 0931   • rivaroxaban (XARELTO) tablet 10 mg  10 mg Oral Daily Leobardo De Luna MD   10 mg at 11/05/21 0931   • sodium chloride 0.9 % flush 10 mL  10 mL Intravenous Q12H Leobardo De Luna MD   10 mL at 11/03/21 0846   • sodium chloride 0.9 % flush 10 mL  10 mL Intravenous PRN Leobardo De Luna MD       • sodium chloride 0.9 % flush 3 mL  3 mL Intravenous Q12H Leobardo De Luna MD       • sodium chloride 0.9 % flush 3-10 mL  3-10 mL Intravenous PRN Leobardo De Luna MD       • sodium chloride 0.9 % infusion  100 mL/hr Intravenous Continuous Leobardo De Luna  mL/hr at 11/05/21 0702 100 mL/hr at 11/05/21 0702   • sulfamethoxazole-trimethoprim (BACTRIM,SEPTRA) 400-80 MG tablet 2 tablet  2 tablet Oral Q12H Dina Barrios APRN   2 tablet at 11/05/21 0931        Physician Progress Notes (last 48 hours)      Leobardo De Luna MD at 11/04/21 1258          ORTHOPEDIC PROGRESS NOTE:    Name:  Corina Lenz  Date:    11/4/2021  Date of admission:  11/2/2021    Post op day:  1 Day Post-Op  Procedure:    Procedure(s) (LRB):  HIP TROCHANTERIC NAILING SHORT WITH INTRAMEDULLARY HIP SCREW (Left)    Subjective: She is having little pain at rest.  She has been up with the  physical therapist.    Vitals:    Vitals:    11/04/21 1132   BP: 116/63   Pulse: 91   Resp: 18   Temp: 99 °F (37.2 °C)   SpO2: 94%       Exam: She is alert calm and in no apparent distress.    There is mild bloody staining of the dressings over her proximal wound.  The thighs and calves are soft.  Sciatic motor and sensory are normal.  The heels are nontender.    Hemoglobin day is 9.9.  Urinalysis showed pyuria with positive nitrite and leukocyte esterase.    Lab Results (last 24 hours)     Procedure Component Value Units Date/Time    Basic Metabolic Panel [[id number]]  (Abnormal) Collected: 11/04/21 0453    Specimen: Blood Updated: 11/04/21 0537     Glucose 119 mg/dL      BUN 8 mg/dL      Creatinine 0.68 mg/dL      Sodium 135 mmol/L      Potassium 3.5 mmol/L      Chloride 104 mmol/L      CO2 25.0 mmol/L      Calcium 8.5 mg/dL      eGFR Non African Amer 83 mL/min/1.73      BUN/Creatinine Ratio 11.8     Anion Gap 6.0 mmol/L     Narrative:      GFR Normal >60  Chronic Kidney Disease <60  Kidney Failure <15      CBC & Differential [[id number]]  (Abnormal) Collected: 11/04/21 0453    Specimen: Blood Updated: 11/04/21 0519    Narrative:      The following orders were created for panel order CBC & Differential.  Procedure                               Abnormality         Status                     ---------                               -----------         ------                     CBC Auto Differential[[id number]]        Abnormal            Final result               Scan Slide[[id number]]                                                                    Please view results for these tests on the individual orders.    CBC Auto Differential [[id number]]  (Abnormal) Collected: 11/04/21 0453    Specimen: Blood Updated: 11/04/21 0519     WBC 5.51 10*3/mm3      RBC 3.34 10*6/mm3      Hemoglobin 9.9 g/dL      Hematocrit 29.9 %      MCV 89.5 fL      MCH 29.6 pg      MCHC 33.1 g/dL      RDW 13.2 %      RDW-SD 43.6 fl      MPV 10.0  fL      Platelets 113 10*3/mm3      Neutrophil % 70.1 %      Lymphocyte % 18.5 %      Monocyte % 10.3 %      Eosinophil % 0.4 %      Basophil % 0.2 %      Immature Grans % 0.5 %      Neutrophils, Absolute 3.86 10*3/mm3      Lymphocytes, Absolute 1.02 10*3/mm3      Monocytes, Absolute 0.57 10*3/mm3      Eosinophils, Absolute 0.02 10*3/mm3      Basophils, Absolute 0.01 10*3/mm3      Immature Grans, Absolute 0.03 10*3/mm3      nRBC 0.0 /100 WBC     Urinalysis, Microscopic Only - Urine, Catheter [[id number]]  (Abnormal) Collected: 11/03/21 1643    Specimen: Urine, Catheter Updated: 11/03/21 1653     RBC, UA 3-5 /HPF      WBC, UA 13-20 /HPF      Bacteria, UA 1+ /HPF      Squamous Epithelial Cells, UA None Seen /HPF      Hyaline Casts, UA 7-12 /LPF      Methodology Automated Microscopy    Urinalysis With Culture If Indicated - Urine, Catheter [[id number]]  (Abnormal) Collected: 11/03/21 1643    Specimen: Urine, Catheter Updated: 11/03/21 1653     Color, UA Yellow     Appearance, UA Cloudy     pH, UA <=5.0     Specific Gravity, UA 1.027     Glucose, UA Negative     Ketones, UA Negative     Bilirubin, UA Negative     Blood, UA Negative     Protein, UA Trace     Leuk Esterase, UA Small (1+)     Nitrite, UA Positive     Urobilinogen, UA 0.2 E.U./dL    Urine Culture - Urine, Urine, Catheter [[id number]] Collected: 11/03/21 1643    Specimen: Urine, Catheter Updated: 11/03/21 1653          ASSESSMENT: 1 satisfactory progress following fixation left hip fracture.  2 possible urinary tract infection.    She has been placed on Bactrim.  She may continue physical therapy weightbearing as tolerated.  For DVT prophylaxis I would like to maintain her on Xarelto during this admission and transition to aspirin 81 mg twice a day after discharge.  Options after discharge include Home with home health care and home physical therapy versus short-term rehab placement.  She would like to go directly home from the hospital if  possible.      Principal Problem:    Closed displaced fracture of base of neck of left femur (HCC)  Active Problems:    Displaced fracture of left femoral neck (HCC)    Primary hypertension        PLAN: Discharge when safe with ambulation.  Remove staples at about 10 days postop with follow-up x-rays in my office at about 2 weeks postop.  Begin daily dressing changes tomorrow.      Leobardo De Luna MD  11/04/21  12:58 CDT              Electronically signed by Leobardo De Luna MD at 11/04/21 1301     Dina Barrios APRN at 11/04/21 0940     Attestation signed by Camacho Cruz MD at 11/04/21 2783    I have examined the patient, reviewed this documentation, discussed with APC and agree with the plan as outlined.     HPI: Patient presented with mechanical fall after tripping at home while trying to move furniture. She had ORIF of a left hip fracture yesterday.  Her pain is well controlled.  However, she had a dizzy spell and some hypotension after working with physical therapy today.  She is on IV fluids.    Physical exam  General: Alert, oriented x3, not in distress  Chest: Breath sounds clear, no rales  Musculoskeletal: Dressing over left hip fracture repair site clean.  Tenderness around left hip region     Assessment  Displaced fracture of left femoral neck  Essential hypertension  Orthostatic hypotension and dizziness  Acute cystitis    Plan  Pain control. Continue IV fluids. Orthopedic surgery input appreciated.  Hold Lasix for now and monitor blood pressure closely.                      [facility] Medicine Services  INPATIENT PROGRESS NOTE    Length of Stay: 2  Date of Admission: 11/2/2021  Primary Care Physician: Provider, No Known    Subjective   Chief Complaint: dizzy   HPI:  81 year old female with past medical history of HTN, HLD who presented on 11/2/2021 after suffering a fall that resulted in left hip fracture.  Fracture was repaired on 11/3 by   "Yeon.  During today's visit, patient reports she had a \"dizzy spell\" when getting out of bed for the first time post op with PT.  Nursing reports her blood pressure dropped to 60's systolic but recovered to 110-120 after returning to bed.  Patient reports she has not been eating or drinking as much since surgery and concern is for hypovolemia.  She otherwise denies complaints aside from mild incisional pain to left hip.     Review of Systems   Constitutional: Negative for chills, fatigue and fever.   HENT: Negative for congestion, rhinorrhea and sore throat.    Respiratory: Negative for cough, chest tightness, shortness of breath and wheezing.    Cardiovascular: Negative for chest pain, palpitations and leg swelling.   Gastrointestinal: Negative for abdominal pain, diarrhea, nausea and vomiting.   Musculoskeletal: Negative for back pain and neck pain.   Skin: Negative for pallor.   Neurological: Positive for dizziness. Negative for weakness and headaches.   Psychiatric/Behavioral: Negative for confusion. The patient is not nervous/anxious.         All pertinent negatives and positives are as above. All other systems have been reviewed and are negative unless otherwise stated.     Objective    Temp:  [96.6 °F (35.9 °C)-99.7 °F (37.6 °C)] 99 °F (37.2 °C)  Heart Rate:  [62-92] 91  Resp:  [13-20] 18  BP: (102-134)/(53-74) 116/63    Physical Exam  Vitals and nursing note reviewed.   Constitutional:       General: She is not in acute distress.     Appearance: Normal appearance. She is not ill-appearing.   HENT:      Head: Normocephalic and atraumatic.      Right Ear: External ear normal.      Left Ear: External ear normal.      Nose: Nose normal.      Mouth/Throat:      Mouth: Mucous membranes are moist.      Pharynx: Oropharynx is clear.   Eyes:      General: No scleral icterus.        Right eye: No discharge.         Left eye: No discharge.      Conjunctiva/sclera: Conjunctivae normal.   Cardiovascular:      Rate and " Rhythm: Normal rate and regular rhythm.      Pulses: Normal pulses.      Heart sounds: Normal heart sounds. No murmur heard.  No friction rub. No gallop.    Pulmonary:      Effort: Pulmonary effort is normal. No respiratory distress.      Breath sounds: Normal breath sounds. No stridor. No wheezing, rhonchi or rales.   Abdominal:      General: Bowel sounds are normal. There is no distension.      Palpations: Abdomen is soft.      Tenderness: There is no abdominal tenderness.   Musculoskeletal:         General: No swelling. Normal range of motion.      Cervical back: Normal range of motion and neck supple.   Skin:     General: Skin is warm and dry.      Comments: Left hip dressing is clean, dry, intact   Neurological:      General: No focal deficit present.      Mental Status: She is alert and oriented to person, place, and time.   Psychiatric:         Mood and Affect: Mood normal.         Behavior: Behavior normal.             Results Review:  I have reviewed the labs, radiology results, and diagnostic studies.    Laboratory Data:   Results from last 7 days   Lab Units 11/04/21 0453 11/03/21 0525 11/02/21  1451   SODIUM mmol/L 135* 133* 138   POTASSIUM mmol/L 3.5 3.8 3.5   CHLORIDE mmol/L 104 100 103   CO2 mmol/L 25.0 24.0 25.0   BUN mg/dL 8 11 13   CREATININE mg/dL 0.68 0.59 0.78   GLUCOSE mg/dL 119* 118* 122*   CALCIUM mg/dL 8.5* 8.9 9.4   BILIRUBIN mg/dL  --  1.0 0.6   ALK PHOS U/L  --  32* 40   ALT (SGPT) U/L  --  14 17   AST (SGOT) U/L  --  22 35*   ANION GAP mmol/L 6.0 9.0 10.0     Estimated Creatinine Clearance: 54.7 mL/min (by C-G formula based on SCr of 0.68 mg/dL).          Results from last 7 days   Lab Units 11/04/21 0453 11/03/21 0525 11/02/21  1451   WBC 10*3/mm3 5.51 6.56 8.71   HEMOGLOBIN g/dL 9.9* 11.1* 13.0   HEMATOCRIT % 29.9* 32.8* 39.1   PLATELETS 10*3/mm3 113* 152 158           Culture Data:   No results found for: BLOODCX  No results found for: URINECX  No results found for: RESPCX  No  results found for: WOUNDCX  No results found for: STOOLCX  No components found for: BODYFLD    Radiology Data:   Imaging Results (Last 24 Hours)     Procedure Component Value Units Date/Time    XR Hip With or Without Pelvis 2 - 3 View Left [[id number]] Collected: 11/03/21 1432     Updated: 11/03/21 1516    Narrative:      Procedure:  Left hip        Indication:  Postop fracture internal fixation..    Technique:  2 views   .    Prior relevant exam: Left hip November 2, 2021..    Left hip fracture without angulation or deformity. Alignment is  significantly improved in comparison with prereduction views.  Alignment is maintained by a lag screw through  the femoral neck  attached to a short intramedullary maryanne through the proximal  femoral shaft.     Skin staples are noted in the lateral aspect left hip and  proximal femur at the incision sites.      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      Impression:      As above.    Electronically signed by:  Timo Looney MD  11/3/2021 3:14 PM CDT  Workstation: [id number]    FL C Arm During Surgery [[id number]] Resulted: 11/03/21 1459     Updated: 11/03/21 1459          I have reviewed the patient's current medications.     Assessment/Plan     Active Hospital Problems    Diagnosis    • **Closed displaced fracture of base of neck of left femur (HCC)      Added automatically from request for surgery [id number]     • Displaced fracture of left femoral neck (HCC)    • Primary hypertension        Plan:    1. Left femur fracture: s/p surgical repair with Dr. De Luna on 11/3.  Continue PT/OT.  Continue pain control.  Continue Xarelto for DVT prevention.   2. Dizziness/orthostasis: continue IV fluids, encourage PO intake.  Patient has already received morning Coreg and Lasix dosing.  Lasix placed on hold and will stop beta blocker if hypotension continues.    3. HLD: continue statin.    4. Acute cystitis: currently on Bactrim.  Awaiting cultures.     Discharge  Planning: awaiting further PT/OT evaluations to determine best rehab course.  Patient prefers home with home health Pt/Ot with family to assist her as needed.     I confirmed that the patient's Advance Care Plan is present, code status is documented, or surrogate decision maker is listed in the patient's medical record.            This document has been electronically signed by EUGENE Juarez on November 4, 2021 13:48 CDT            Electronically signed by Camacho Cruz MD at 11/04/21 5722       Medical Student Notes (last 24 hours)  Notes from 11/04/21 1105 through 11/05/21 1105   No notes of this type exist for this encounter.         Consult Notes (last 24 hours)  Notes from 11/04/21 1105 through 11/05/21 1105   No notes of this type exist for this encounter.

## 2021-11-05 NOTE — PROGRESS NOTES
[facility] Medicine Services  INPATIENT PROGRESS NOTE    Length of Stay: 3  Date of Admission: 11/2/2021  Primary Care Physician: Provider, No Known    Subjective   Chief Complaint: dizzy   HPI:  81 year old female with past medical history of HTN, HLD who presented on 11/2/2021 after suffering a fall that resulted in left hip fracture.  Fracture was repaired on 11/3 by Dr. De Luna.  During today's visit, patient denies complaints.  She has had no further episodes of dizziness or hypotension.    Review of Systems   Constitutional: Negative for chills, fatigue and fever.   HENT: Negative for congestion, rhinorrhea and sore throat.    Respiratory: Negative for cough, chest tightness, shortness of breath and wheezing.    Cardiovascular: Negative for chest pain, palpitations and leg swelling.   Gastrointestinal: Negative for abdominal pain, diarrhea, nausea and vomiting.   Musculoskeletal: Negative for back pain and neck pain.   Skin: Negative for pallor.   Neurological: Negative for dizziness, weakness and headaches.   Psychiatric/Behavioral: Negative for confusion. The patient is not nervous/anxious.         All pertinent negatives and positives are as above. All other systems have been reviewed and are negative unless otherwise stated.     Objective    Temp:  [97.5 °F (36.4 °C)-99 °F (37.2 °C)] 98.2 °F (36.8 °C)  Heart Rate:  [76-88] 80  Resp:  [16-20] 18  BP: (113-138)/(53-70) 122/60    Physical Exam  Vitals and nursing note reviewed.   Constitutional:       General: She is not in acute distress.     Appearance: Normal appearance. She is not ill-appearing.   HENT:      Head: Normocephalic and atraumatic.      Right Ear: External ear normal.      Left Ear: External ear normal.      Nose: Nose normal.      Mouth/Throat:      Mouth: Mucous membranes are moist.      Pharynx: Oropharynx is clear.   Eyes:      General: No scleral icterus.        Right eye: No discharge.          Left eye: No discharge.      Conjunctiva/sclera: Conjunctivae normal.   Cardiovascular:      Rate and Rhythm: Normal rate and regular rhythm.      Pulses: Normal pulses.      Heart sounds: Normal heart sounds. No murmur heard.  No friction rub. No gallop.    Pulmonary:      Effort: Pulmonary effort is normal. No respiratory distress.      Breath sounds: Normal breath sounds. No stridor. No wheezing, rhonchi or rales.   Abdominal:      General: Bowel sounds are normal. There is no distension.      Palpations: Abdomen is soft.      Tenderness: There is no abdominal tenderness.   Musculoskeletal:         General: No swelling. Normal range of motion.      Cervical back: Normal range of motion and neck supple.   Skin:     General: Skin is warm and dry.      Comments: Left hip dressing intact.  Small amount of sanguinous drainage noted.   Neurological:      General: No focal deficit present.      Mental Status: She is alert and oriented to person, place, and time.   Psychiatric:         Mood and Affect: Mood normal.         Behavior: Behavior normal.             Results Review:  I have reviewed the labs, radiology results, and diagnostic studies.    Laboratory Data:   Results from last 7 days   Lab Units 11/04/21 0453 11/03/21 0525 11/02/21  1451   SODIUM mmol/L 135* 133* 138   POTASSIUM mmol/L 3.5 3.8 3.5   CHLORIDE mmol/L 104 100 103   CO2 mmol/L 25.0 24.0 25.0   BUN mg/dL 8 11 13   CREATININE mg/dL 0.68 0.59 0.78   GLUCOSE mg/dL 119* 118* 122*   CALCIUM mg/dL 8.5* 8.9 9.4   BILIRUBIN mg/dL  --  1.0 0.6   ALK PHOS U/L  --  32* 40   ALT (SGPT) U/L  --  14 17   AST (SGOT) U/L  --  22 35*   ANION GAP mmol/L 6.0 9.0 10.0     Estimated Creatinine Clearance: 56.3 mL/min (by C-G formula based on SCr of 0.68 mg/dL).          Results from last 7 days   Lab Units 11/04/21 0453 11/03/21  0525 11/02/21  1451   WBC 10*3/mm3 5.51 6.56 8.71   HEMOGLOBIN g/dL 9.9* 11.1* 13.0   HEMATOCRIT % 29.9* 32.8* 39.1   PLATELETS 10*3/mm3 113*  152 158           Culture Data:   No results found for: BLOODCX  Urine Culture   Date Value Ref Range Status   11/03/2021 No growth  Final     No results found for: RESPCX  No results found for: WOUNDCX  No results found for: STOOLCX  No components found for: BODYFLD    Radiology Data:   Imaging Results (Last 24 Hours)     ** No results found for the last 24 hours. **          I have reviewed the patient's current medications.     Assessment/Plan     Active Hospital Problems    Diagnosis    • **Closed displaced fracture of base of neck of left femur (HCC)      Added automatically from request for surgery [id number]     • Displaced fracture of left femoral neck (HCC)    • Primary hypertension        Plan:    1. Left femur fracture: s/p surgical repair with Dr. De Luna on 11/3.  Continue PT/OT.  Continue pain control.  Continue Xarelto for DVT prevention while hospitalized with plans for transition to BID baby aspirin dosing at discharge per Dr. De Luna.  2. Dizziness/orthostasis: resolved.  Will discontinue IV fluids, encourage PO intake.   3. HLD: continue statin.    4. Acute cystitis: currently on Bactrim.  No growth on urine culture    Discharge Planning: awaiting further PT/OT evaluations to determine best rehab course.  Patient wishes for home with home health if possible at discharge.  Home health orders and rolling walker order placed.    I confirmed that the patient's Advance Care Plan is present, code status is documented, or surrogate decision maker is listed in the patient's medical record.            This document has been electronically signed by EUGENE Juarez on November 5, 2021 15:33 CDT

## 2021-11-05 NOTE — PLAN OF CARE
Goal Outcome Evaluation:  Plan of Care Reviewed With: patient        Progress: no change  Outcome Summary: Pt working with PT/OT this shift; v/s stable; c/o pain, PRN medication administered and effective; will continue to monitor

## 2021-11-05 NOTE — THERAPY TREATMENT NOTE
Acute Care - Physical Therapy Treatment Note  [facility]     Patient Name: Corina Lenz  : 1940  MRN: [id number]  Today's Date: 2021      Visit Dx:     ICD-10-CM ICD-9-CM   1. Displaced fracture of left femoral neck ([facility])  S72.002A 820.8   2. Closed displaced basicervical fracture of left femur, initial encounter ([facility])  S72.042A 820.03   3. Impaired mobility and ADLs  Z74.09 V49.89    Z78.9    4. Impaired functional mobility, balance, gait, and endurance  Z74.09 V49.89     Patient Active Problem List   Diagnosis   • Displaced fracture of left femoral neck (HCC)   • Primary hypertension   • Closed displaced fracture of base of neck of left femur (HCC)     Past Medical History:   Diagnosis Date   • CHF (congestive heart failure) (HCC)    • HLD (hyperlipidemia)    • Hypertension      Past Surgical History:   Procedure Laterality Date   • HIP TROCHANTERIC NAILING WITH INTRAMEDULLARY HIP SCREW Left 11/3/2021    Procedure: HIP TROCHANTERIC NAILING SHORT WITH INTRAMEDULLARY HIP SCREW;  Surgeon: Leobardo De Luna MD;  Location: [facility];  Service: Orthopedics;  Laterality: Left;     PT Assessment (last 12 hours)     PT Evaluation and Treatment     Row Name 21 110          Physical Therapy Time and Intention    Subjective Information no complaints  -TA     Document Type therapy note (daily note)  -TA     Mode of Treatment individual therapy; physical therapy  -TA     Row Name 21 110          General Information    Patient Profile Reviewed yes  -TA     Existing Precautions/Restrictions fall  -TA     Row Name 21 110          Cognition    Orientation Status (Cognition) oriented x 4  -TA     Personal Safety Interventions fall prevention program maintained; gait belt; muscle strengthening facilitated; nonskid shoes/slippers when out of bed; supervised activity  -TA     Row Name 21 110          Pain Scale: Numbers Pre/Post-Treatment    Pretreatment Pain Rating 0/10 - no pain  -TA      Posttreatment Pain Rating 0/10 - no pain  -     Row Name 11/05/21 1109          Range of Motion Comprehensive    General Range of Motion bilateral lower extremity ROM WFL  -     Row Name 11/05/21 1109          Strength Comprehensive (MMT)    General Manual Muscle Testing (MMT) Assessment other (see comments)  -     Row Name 11/05/21 1109          Mobility    Extremity Weight-bearing Status left lower extremity  -TA     Left Lower Extremity (Weight-bearing Status) weight-bearing as tolerated (WBAT)  -     Row Name 11/05/21 1109          Bed Mobility    Bed Mobility supine-sit; scooting/bridging; rolling right; rolling left  -TA     Rolling Left [place] (Bed Mobility) not tested  -TA     Rolling Right [place] (Bed Mobility) not tested  -TA     Supine-Sit [place] (Bed Mobility) not tested  -TA     Sit-Supine [place] (Bed Mobility) not tested  -TA     Assistive Device (Bed Mobility) bed rails; head of bed elevated; draw sheet  -     Row Name 11/05/21 1109          Transfers    Transfers sit-stand transfer; stand-sit transfer  -TA     Sit-Stand [place] (Transfers) minimum assist (75% patient effort)  -TA     Stand-Sit [place] (Transfers) minimum assist (75% patient effort)  -TA     [place] Level (Toilet Transfer) minimum assist (75% patient effort); 1 person to manage equipment  -     Row Name 11/05/21 1109          Sit-Stand Transfer    Assistive Device (Sit-Stand Transfers) walker, front-wheeled  -TA     Row Name 11/05/21 1109          Stand-Sit Transfer    Assistive Device (Stand-Sit Transfers) walker, front-wheeled  -TA     Row Name 11/05/21 1109          Toilet Transfer    Type (Toilet Transfer) sit-stand; stand-sit  -     Row Name 11/05/21 1109          Gait/Stairs (Locomotion)    [place] Level (Gait) minimum assist (75% patient effort); maximum assist (25% patient effort)  -TA     Assistive Device (Gait) walker, front-wheeled  -TA     Distance in Feet (Gait)  20` x 2  -TA     Row Name 11/05/21 1109          Safety Issues, Functional Mobility    Impairments Affecting Function (Mobility) strength; endurance/activity tolerance; balance; range of motion (ROM); pain  -TA     Row Name             Wound 11/03/21 Left lateral hip Incision    Wound - Properties Group Placement Date: 11/03/21  -KB Present on Hospital Admission: N  -KB Side: Left  -KB Orientation: lateral  -KB Location: hip  -KB, x2  Primary Wound Type: Incision  -KB     Retired Wound - Properties Group Date first assessed: 11/03/21  -KB Present on Hospital Admission: N  -KB Side: Left  -KB Location: hip  -KB, x2  Primary Wound Type: Incision  -KB     Row Name 11/05/21 1109          Plan of Care Review    Plan of Care Reviewed With patient  -TA     Progress improving  -TA     Outcome Summary pt sitting in chair upon entry, pt sit<>stand with min assist, pt ambulated 20` x 2 with RW & min assist of 1. pt will require 24/7 care & continued PT services @ D/C  -TA     Row Name 11/05/21 1109          Vital Signs    Pre Systolic BP Rehab 133  -TA     Pre Treatment Diastolic BP 61  -TA     Post Systolic BP Rehab 114  -TA     Post Treatment Diastolic BP 51  -TA     Pretreatment Heart Rate (beats/min) 80  -TA     Posttreatment Heart Rate (beats/min) 80  -TA     Pre SpO2 (%) 96  -TA     O2 Delivery Pre Treatment room air  -TA     Post SpO2 (%) 95  -TA     O2 Delivery Post Treatment room air  -TA     Row Name 11/05/21 1109          Bed Mobility Goal 1 (PT)    Activity/Assistive Device (Bed Mobility Goal 1, PT) sit to supine/supine to sit  -TA     [place] Level/Cues Needed (Bed Mobility Goal 1, PT) independent  -TA     Time Frame (Bed Mobility Goal 1, PT) by discharge  -TA     Strategies/Barriers (Bed Mobility Goal 1, PT) L hip ORIF, WBAT.  -TA     Progress/Outcomes (Bed Mobility Goal 1, PT) goal not met  -TA     Row Name 11/05/21 1109          Transfer Goal 1 (PT)    Activity/Assistive Device (Transfer Goal 1, PT)  sit-to-stand/stand-to-sit; bed-to-chair/chair-to-bed  -TA     [place] Level/Cues Needed (Transfer Goal 1, PT) modified independence  -TA     Time Frame (Transfer Goal 1, PT) by discharge  -TA     Strategies/Barriers (Transfers Goal 1, PT) L hip ORIF, WBAT.  -TA     Progress/Outcome (Transfer Goal 1, PT) goal not met  -TA     Row Name 11/05/21 1109          Gait Training Goal 1 (PT)    Activity/Assistive Device (Gait Training Goal 1, PT) walker, rolling  -TA     [place] Level (Gait Training Goal 1, PT) modified independence  -TA     Distance (Gait Training Goal 1, PT) 100' x 1.  -TA     Time Frame (Gait Training Goal 1, PT) by discharge  -TA     Strategies/Barriers (Gait Training Goal 1, PT) L hip ORIF, WBAT. Orthostatic hypotension during evaluation.  -TA     Progress/Outcome (Gait Training Goal 1, PT) goal not met  -TA     Row Name 11/05/21 1109          Positioning and Restraints    Pre-Treatment Position sitting in chair/recliner  -TA     Post Treatment Position chair  -TA     In Bed sitting; call light within reach  -TA     Row Name 11/05/21 1109          Therapy Assessment/Plan (PT)    Rehab Potential (PT) good, to achieve stated therapy goals  -TA     Criteria for Skilled Interventions Met (PT) yes; skilled treatment is necessary  -TA     Comment, Therapy Assessment/Plan (PT) continue  -TA           User Key  (r) = Recorded By, (t) = Taken By, (c) = Cosigned By    Initials Name Provider Type    TA Lyn Arthur PTA Physical Therapy Assistant    Lexus Dempsey RN Registered Nurse              Physical Therapy Education                 Title: PT OT SLP Therapies (In Progress)     Topic: Physical Therapy (In Progress)     Point: Mobility training (Done)     Learning Progress Summary           Patient Acceptance, E, VU,NR by CZ at 11/4/2021 1001    Comment: PT POC, rehab process, use of walker.                   Point: Home exercise program (Not Started)     Learner Progress:  Not documented in  this visit.          Point: Body mechanics (Not Started)     Learner Progress:  Not documented in this visit.          Point: Precautions (Not Started)     Learner Progress:  Not documented in this visit.                      User Key     Initials Effective Dates Name Provider Type Discipline    CZ 06/16/21 -  Gabriel Salguero, PT Physical Therapist PT              PT Recommendation and Plan  Anticipated Discharge Disposition (PT): home with assist, home with home health  Therapy Frequency (PT): daily  Plan of Care Reviewed With: patient  Progress: improving  Outcome Summary: pt sitting in chair upon entry, pt sit<>stand with min assist, pt ambulated 20` x 2 with RW & min assist of 1. pt will require 24/7 care & continued PT services @ D/C   Outcome Measures     Row Name 11/05/21 1200             How much help from another person do you currently need...    Turning from your back to your side while in flat bed without using bedrails? 3  -TA      Moving from lying on back to sitting on the side of a flat bed without bedrails? 3  -TA      Moving to and from a bed to a chair (including a wheelchair)? 3  -TA      Standing up from a chair using your arms (e.g., wheelchair, bedside chair)? 3  -TA      Climbing 3-5 steps with a railing? 2  -TA      To walk in hospital room? 3  -TA      AM-PAC 6 Clicks Score (PT) 17  -TA              Functional Assessment    Outcome Measure Options AM-PAC 6 Clicks Basic Mobility (PT)  -TA            User Key  (r) = Recorded By, (t) = Taken By, (c) = Cosigned By    Initials Name Provider Type    TA Lyn Arthur, PTA Physical Therapy Assistant                 Time Calculation:    PT Charges     Row Name 11/05/21 1250             Time Calculation    Start Time 1109  -TA      Stop Time 1144  -TA      Time Calculation (min) 35 min  -TA      PT Received On 11/05/21  -TA              Time Calculation- PT    Total Timed Code Minutes- PT 35 minute(s)  -TA              Timed Charges    13799 -  Gait Training Minutes  20  -TA      31657 - PT Therapeutic Activity Minutes 15  -TA              Total Minutes    Timed Charges Total Minutes 35  -TA       Total Minutes 35  -TA            User Key  (r) = Recorded By, (t) = Taken By, (c) = Cosigned By    Initials Name Provider Type    Lyn Garvin PTA Physical Therapy Assistant              Therapy Charges for Today     Code Description Service Date Service Provider Modifiers Qty    84352808197 HC GAIT TRAINING EA 15 MIN 11/5/2021 Lyn Arthur PTA GP 1    65950895741 HC PT THERAPEUTIC ACT EA 15 MIN 11/5/2021 Lyn Arthur PTA GP 1          PT G-Codes  Outcome Measure Options: AM-PAC 6 Clicks Basic Mobility (PT)  AM-PAC 6 Clicks Score (PT): 17  AM-PAC 6 Clicks Score (OT): 16    Lyn Arthur PTA  11/5/2021

## 2021-11-05 NOTE — PLAN OF CARE
Goal Outcome Evaluation:  Plan of Care Reviewed With: patient        Progress: improving  Outcome Summary: pt sitting in chair upon entry, pt sit<>stand with min assist, pt ambulated 20` x 2 with RW & min assist of 1. pt will require 24/7 care & continued PT services @ D/C

## 2021-11-05 NOTE — THERAPY TREATMENT NOTE
Patient Name: Corina Lenz  : 1940    MRN: [id number]                              Today's Date: 2021       Admit Date: 2021    Visit Dx:     ICD-10-CM ICD-9-CM   1. Displaced fracture of left femoral neck (HCC)  S72.002A 820.8   2. Closed displaced basicervical fracture of left femur, initial encounter ([facility])  S72.042A 820.03   3. Impaired mobility and ADLs  Z74.09 V49.89    Z78.9    4. Impaired functional mobility, balance, gait, and endurance  Z74.09 V49.89     Patient Active Problem List   Diagnosis   • Displaced fracture of left femoral neck (HCC)   • Primary hypertension   • Closed displaced fracture of base of neck of left femur (HCC)     Past Medical History:   Diagnosis Date   • CHF (congestive heart failure) (HCC)    • HLD (hyperlipidemia)    • Hypertension      Past Surgical History:   Procedure Laterality Date   • HIP TROCHANTERIC NAILING WITH INTRAMEDULLARY HIP SCREW Left 11/3/2021    Procedure: HIP TROCHANTERIC NAILING SHORT WITH INTRAMEDULLARY HIP SCREW;  Surgeon: Leobardo De Luna MD;  Location: [facility];  Service: Orthopedics;  Laterality: Left;      General Information     Row Name 21 1011          OT Time and Intention    Document Type therapy note (daily note)  -ME     Mode of Treatment individual therapy; occupational therapy  -ME     Row Name 21 1011          General Information    Patient Profile Reviewed yes  -ME     Existing Precautions/Restrictions fall  -ME     Row Name 21 1011          Cognition    Orientation Status (Cognition) oriented x 4  -ME     Row Name 21 1011          Safety Issues, Functional Mobility    Safety Issues Affecting Function (Mobility) safety precaution awareness; safety precautions follow-through/compliance  -ME     Impairments Affecting Function (Mobility) balance; endurance/activity tolerance; strength; pain; range of motion (ROM)  -ME           User Key  (r) = Recorded By, (t) = Taken By, (c) = Cosigned By    Initials Name  Provider Type    ME Yolanda Avina OTR/L Occupational Therapist                 Mobility/ADL's     Row Name 11/05/21 1011          Bed Mobility    Comment (Bed Mobility) up in chair at beginning and end of session  -ME     Row Name 11/05/21 1011          Transfers    Transfers sit-stand transfer  -ME     Sit-Stand [place] (Transfers) minimum assist (75% patient effort)  -ME     Row Name 11/05/21 1011          Sit-Stand Transfer    Assistive Device (Sit-Stand Transfers) walker, front-wheeled  -ME     Row Name 11/05/21 1011          Mobility    Extremity Weight-bearing Status left lower extremity  -ME     Left Lower Extremity (Weight-bearing Status) weight-bearing as tolerated (WBAT)  -ME           User Key  (r) = Recorded By, (t) = Taken By, (c) = Cosigned By    Initials Name Provider Type    ME Yolanda Avina OTR/L Occupational Therapist               Obj/Interventions     Row Name 11/05/21 1011          Shoulder (Therapeutic Exercise)    Shoulder (Therapeutic Exercise) strengthening exercise  -ME     Shoulder Strengthening (Therapeutic Exercise) bilateral; flexion; extension; aBduction; aDduction; horizontal aBduction/aDduction; resisted diagonal exercise; sitting; 1 lb free weight; 3 lb free weight; 10 repetitions; 2 sets  -ME     Row Name 11/05/21 1011          Elbow/Forearm (Therapeutic Exercise)    Elbow/Forearm (Therapeutic Exercise) strengthening exercise  -ME     Elbow/Forearm Strengthening (Therapeutic Exercise) bilateral; flexion; extension; sitting; 1 lb free weight; 3 lb free weight; 10 repetitions; 2 sets  -ME     Row Name 11/05/21 1011          Wrist (Therapeutic Exercise)    Wrist (Therapeutic Exercise) strengthening exercise  -ME     Wrist Strengthening (Therapeutic Exercise) bilateral; flexion; extension; 1 lb free weight; 3 lb free weight; 10 repetitions; 2 sets  -ME     Row Name 11/05/21 1011          Therapeutic Exercise    Therapeutic Exercise shoulder; elbow/forearm; wrist  -ME            User Key  (r) = Recorded By, (t) = Taken By, (c) = Cosigned By    Initials Name Provider Type    Yolanda [place], OTR/L Occupational Therapist               Goals/Plan     Row Name 11/05/21 1011          Transfer Goal 1 (OT)    Activity/Assistive Device (Transfer Goal 1, OT) toilet  -ME     [place] Level/Cues Needed (Transfer Goal 1, OT) minimum assist (75% or more patient effort)  -ME     Time Frame (Transfer Goal 1, OT) long term goal (LTG); by discharge  -ME     Progress/Outcome (Transfer Goal 1, OT) goal not met  -ME     Row Name 11/05/21 1011          Bathing Goal 1 (OT)    Activity/Device (Bathing Goal 1, OT) lower body bathing  -ME     [place] Level/Cues Needed (Bathing Goal 1, OT) standby assist  -ME     Time Frame (Bathing Goal 1, OT) long term goal (LTG); by discharge  -ME     Progress/Outcomes (Bathing Goal 1, OT) goal not met  -ME     Row Name 11/05/21 1011          Dressing Goal 1 (OT)    Activity/Device (Dressing Goal 1, OT) lower body dressing  -ME     [place]/Cues Needed (Dressing Goal 1, OT) standby assist; minimum assist (75% or more patient effort)  -ME     Time Frame (Dressing Goal 1, OT) long term goal (LTG); by discharge  -ME     Progress/Outcome (Dressing Goal 1, OT) goal not met  -ME     Row Name 11/05/21 1011          Toileting Goal 1 (OT)    [place] Level/Cues Needed (Toileting Goal 1, OT) minimum assist (75% or more patient effort)  -ME     Time Frame (Toileting Goal 1, OT) long term goal (LTG); by discharge  -ME     Progress/Outcome (Toileting Goal 1, OT) goal not met  -ME           User Key  (r) = Recorded By, (t) = Taken By, (c) = Cosigned By    Initials Name Provider Type    Yolanda [place], OTR/L Occupational Therapist               Clinical Impression     Row Name 11/05/21 1011          Pain Assessment    Additional Documentation Pain Scale: Numbers Pre/Post-Treatment (Group)  -ME     Row Name 11/05/21 1011          Pain Scale: Numbers Pre/Post-Treatment     Pretreatment Pain Rating 0/10 - no pain  -ME     Posttreatment Pain Rating 0/10 - no pain  -ME     Pre/Posttreatment Pain Comment states she has no pain at rest, but does have pain with movement  -ME     Row Name 11/05/21 1011          Plan of Care Review    Plan of Care Reviewed With patient  -ME     Row Name 11/05/21 1011          Therapy Assessment/Plan (OT)    Therapy Frequency (OT) daily  -ME     Row Name 11/05/21 1011          Therapy Plan Review/Discharge Plan (OT)    Anticipated Discharge Disposition (OT) inpatient rehabilitation facility; skilled nursing facility  -ME     Row Name 11/05/21 1011          Vital Signs    Pre Systolic BP Rehab 136  -ME     Pre Treatment Diastolic BP 67  -ME     Pretreatment Heart Rate (beats/min) 84  -ME     Pre SpO2 (%) 96  -ME     O2 Delivery Pre Treatment room air  -ME     Row Name 11/05/21 1011          Positioning and Restraints    Pre-Treatment Position sitting in chair/recliner  -ME     Post Treatment Position chair  -ME     In Chair notified nsg; reclined; call light within reach; encouraged to call for assist  -ME           User Key  (r) = Recorded By, (t) = Taken By, (c) = Cosigned By    Initials Name Provider Type    ME Yolanda Avina, OTR/L Occupational Therapist               Outcome Measures     Row Name 11/05/21 1200          How much help from another person do you currently need...    Turning from your back to your side while in flat bed without using bedrails? 3  -TA     Moving from lying on back to sitting on the side of a flat bed without bedrails? 3  -TA     Moving to and from a bed to a chair (including a wheelchair)? 3  -TA     Standing up from a chair using your arms (e.g., wheelchair, bedside chair)? 3  -TA     Climbing 3-5 steps with a railing? 2  -TA     To walk in hospital room? 3  -TA     AM-PAC 6 Clicks Score (PT) 17  -TA     [facility] Name 11/05/21 1200          Functional Assessment    Outcome Measure Options AM-PAC 6 Clicks Basic Mobility (PT)  -TA            User Key  (r) = Recorded By, (t) = Taken By, (c) = Cosigned By    Initials Name Provider Type    Lyn Garvin, PTA Physical Therapy Assistant                Occupational Therapy Education                 Title: PT OT SLP Therapies (In Progress)     Topic: Occupational Therapy (In Progress)     Point: ADL training (Not Started)     Description:   Instruct learner(s) on proper safety adaptation and remediation techniques during self care or transfers.   Instruct in proper use of assistive devices.              Learner Progress:  Not documented in this visit.          Point: Home exercise program (Not Started)     Description:   Instruct learner(s) on appropriate technique for monitoring, assisting and/or progressing therapeutic exercises/activities.              Learner Progress:  Not documented in this visit.          Point: Precautions (Done)     Description:   Instruct learner(s) on prescribed precautions during self-care and functional transfers.              Learning Progress Summary           Patient Acceptance, E, VU by ME at 11/4/2021 0935    Comment: Educated on OT And POC. Educated to call for assistance. Educated on safety precautions.    Acceptance, E,TB, NR by  at 11/3/2021 1621    Comment: POC, role of OT, transfer training                   Point: Body mechanics (Done)     Description:   Instruct learner(s) on proper positioning and spine alignment during self-care, functional mobility activities and/or exercises.              Learning Progress Summary           Patient Acceptance, E, VU by ME at 11/4/2021 0935    Comment: Educated on OT And POC. Educated to call for assistance. Educated on safety precautions.    Acceptance, E,TB, NR by  at 11/3/2021 1621    Comment: POC, role of OT, transfer training                               User Key     Initials Effective Dates Name Provider Type Discipline    ME 06/16/21 -  Yolanda Avina OTR/L Occupational Therapist OT     06/14/21 -   Fazal Cheatham, OT Occupational Therapist OT              OT Recommendation and Plan  Therapy Frequency (OT): daily  Plan of Care Review  Plan of Care Reviewed With: patient  Outcome Summary: OT treatment complete. pt was sitting up in chair at beginning and end of session. completed sit to stand transfers from chair with min A and use of RW. completed BUE ther ex with 1lb and 3 lb weights. rest breaks taken as needed. done ine all planes. no goals met this date. continue per OT POC.     Time Calculation:    Time Calculation- OT     Row Name 11/05/21 1336 11/05/21 1250          Time Calculation- OT    OT Start Time 1011  -ME --     OT Stop Time 1036  -ME --     OT Time Calculation (min) 25 min  -ME --     OT Received On 11/05/21  -ME --            Timed Charges    68306 - OT Therapeutic Exercise Minutes 25  -ME --     97553 - Gait Training Minutes  -- 20  -TA            Total Minutes    Timed Charges Total Minutes 25  -ME 20  -TA      Total Minutes 25  -ME 20  -TA           User Key  (r) = Recorded By, (t) = Taken By, (c) = Cosigned By    Initials Name Provider Type    Lyn Garvin, PTA Physical Therapy Assistant    ME Yolanda Avina OTR/L Occupational Therapist              Therapy Charges for Today     Code Description Service Date Service Provider Modifiers Qty    17583794306 HC OT THER PROC EA 15 MIN 11/4/2021 Yolanda Avina OTR/L GO 1    10486190605 HC OT SELF CARE/MGMT/TRAIN EA 15 MIN 11/4/2021 Yolanda Avina OTR/L GO 1    24876345853 HC OT THER PROC EA 15 MIN 11/5/2021 Yolanda Avina OTR/L GO 2               Yolanda Avina OTR/RAVINDER  11/5/2021

## 2021-11-05 NOTE — DISCHARGE PLACEMENT REQUEST
"Ministerio Lenz (81 y.o. Female)             Date of Birth Social Security Number Address Home Phone MRN    1940  [address] [place] [address] [phone] [id number]    Confucianism Marital Status             None        Admission Date Admission Type Admitting Provider Attending Provider Department, Room/Bed    11/2/21 Emergency Camacho Cruz MD Ebenibo, Sotonte E, MD [address], 430/1    Discharge Date Discharge Disposition Discharge Destination                         Attending Provider: Camacho Cruz MD    Allergies: No Known Allergies    Isolation: None   Infection: None   Code Status: CPR   Advance Care Planning Activity    Ht: 162.6 cm (64\")   Wt: 74.2 kg (163 lb 9.6 oz)    Admission Cmt: None   Principal Problem: Closed displaced fracture of base of neck of left femur (HCC) [S72.042A] More...                 Active Insurance as of 11/2/2021     Primary Coverage     Payor Plan Insurance Group Employer/Plan Group    ANTHEM MEDICARE REPLACEMENT On license of [facility] MEDICARE ADVANTAGE [id number]     Payor Plan Address Payor Plan Phone Number Payor Plan Fax Number Effective Dates    [address] [phone]  1/1/2021 - None Entered    [facility] [address]       Subscriber Name Subscriber Birth Date Member ID       MINISTERIO LENZ 1940 [id number]                 Emergency Contacts      (Rel.) Home Phone Work Phone Mobile Phone    Donya Worley (Relative) [phone] -- [phone]            Insurance Information                ANTHEM MEDICARE REPLACEMENT/ANTHEM MEDICARE ADVANTAGE Phone: [phone]    Subscriber: Ministerio Lenz Subscriber#: [id number]    Group#: [id number] Precert#: --          [address] [address]  Phone:  [phone]  Fax:   Date: Nov 5, 2021      Ambulatory Referral to Home Health     Patient:  Ministerio Lenz MRN:  [id number]   [address] " [facility] [address] :  1940  SSN:    Phone: [phone] Sex:  F      INSURANCE PAYOR PLAN GROUP # SUBSCRIBER ID   Primary:    [employer] MEDICARE REPLACEMENT [id number]      Referring Provider Information:  LESLIE BARRIOS Phone: [phone] Fax:       Referral Information:   # Visits:  999 Referral Type: Home Health [42]   Urgency:  Routine Referral Reason: Specialty Services Required   Start Date: 2021 End Date:  To be determined by Insurer   Diagnosis: Closed displaced basicervical fracture of left femur with routine healing, subsequent encounter (S72.042D [ICD-10-CM] V54.13 [ICD-9-CM])      Refer to Dept:   Refer to Provider:   Refer to Facility:       Face to Face Visit Date: 2021  Follow-up provider for Plan of Care? I treated the patient in an acute care facility and will not continue treatment after discharge.  Follow-up provider: NO KNOWN PROVIDER [[id number]]  Reason/Clinical Findings: deconditioning r/t left hip fx  Describe mobility limitations that make leaving home difficult: deconditioning r/t left hip fx  Nursing/Therapeutic Services Requested: Physical Therapy  Nursing/Therapeutic Services Requested: Occupational Therapy  PT orders: Strengthening  Occupational orders: Strengthening  Frequency: 1 Week 1     This document serves as a request of services and does not constitute Insurance authorization or approval of services.  To determine eligibility, please contact the members Insurance carrier to verify and review coverage.     If you have medical questions regarding this request for services. Please contact [address] at [phone] during normal business hours.         Authorizing Provider:Leslie Barrios APRN  Authorizing Provider's NPI: [id number]  Order Entered By: Leslie Barrios APRN 2021  2:11 PM     Electronically signed by: Leslie Barrios APRN 2021  2:11 PM

## 2021-11-05 NOTE — DISCHARGE PLACEMENT REQUEST
"Ministerio Lenz (81 y.o. Female)             Date of Birth Social Security Number Address Home Phone MRN    1940  [address] [place] [id number] [phone] [id number]    [place] Marital Status             None        Admission Date Admission Type Admitting Provider Attending Provider Department, Room/Bed    11/2/21 Emergency Camacho Cruz MD Ebenibo, Sotonte E, MD [address], 430/1    Discharge Date Discharge Disposition Discharge Destination                         Attending Provider: Camacho Cruz MD    Allergies: No Known Allergies    Isolation: None   Infection: None   Code Status: CPR   Advance Care Planning Activity    Ht: 162.6 cm (64\")   Wt: 74.2 kg (163 lb 9.6 oz)    Admission Cmt: None   Principal Problem: Closed displaced fracture of base of neck of left femur (HCC) [S72.042A] More...                 Active Insurance as of 11/2/2021     Primary Coverage     Payor Plan Insurance Group Employer/Plan Group    ANTHEM MEDICARE REPLACEMENT formerly [facility] MEDICARE ADVANTAGE [id number]     Payor Plan Address Payor Plan Phone Number Payor Plan Fax Number Effective Dates    [address] [phone]  1/1/2021 - None Entered    [facility] [address]       Subscriber Name Subscriber Birth Date Member ID       MINISTERIO LENZ 1940 [id number]                 Emergency Contacts      (Rel.) Home Phone Work Phone Mobile Phone    Donya Worley (Relative) [phone] -- [phone]            Insurance Information                ANTHEM MEDICARE REPLACEMENT/ANTHEM MEDICARE ADVANTAGE Phone: [phone]    Subscriber: Ministerio Lenz Subscriber#: [id number]    Group#: [id number] Precert#: --          [address] [address]  Dept. Phone:  [phone]  Dept. Fax:   Date Ordered: Nov 5, 2021         Patient:  Ministerio Lenz MRN:  [id number]   [address] [facility] " "[address] :  1940  SSN:    Phone: [phone] Sex:  F     Weight: 74.2 kg (163 lb 9.6 oz)         Ht Readings from Last 1 Encounters:   21 162.6 cm (64\")         Miscellaneous DME   (Order ID: [id number])    Diagnosis:  Closed displaced basicervical fracture of left femur with routine healing, subsequent encounter (S72.042D [ICD-10-CM] V54.13 [ICD-9-CM])   Quantity:  1     Type of DME: rolling walker  Length of Need (99 Months = Lifetime): 99 Months = Lifetime        Authorizing Provider's Phone: [phone]     Authorizing Provider:Dina Barrios APRN  Authorizing Provider's NPI: [id number]  Order Entered By: Dina Barrios APRN 2021  2:11 PM     Electronically signed by: Dina Barrios APRN 2021  2:11 PM          "

## 2021-11-05 NOTE — PLAN OF CARE
Goal Outcome Evaluation:  Plan of Care Reviewed With: patient        Progress: improving  Outcome Summary: Patient received lying in bed awake alert and oriented. VS taken stable. Medicated X2 for pain tolerated well rested quietly throughout the shift. condition remains stable. will continue to monitor.

## 2021-11-05 NOTE — PLAN OF CARE
Goal Outcome Evaluation:  Plan of Care Reviewed With: patient           Outcome Summary: OT treatment complete. pt was sitting up in chair at beginning and end of session. completed sit to stand transfers from chair with min A and use of RW. completed BUE ther ex with 1lb and 3 lb weights. rest breaks taken as needed. done ine all planes. no goals met this date. continue per OT POC.

## 2021-11-05 NOTE — PROGRESS NOTES
Ortho PN    POD #2 s/p IMN L hip.  No complaints.  Ambulating with PT this AM.    99.7  76-91  113-138/63-70  16-20  93-97% RA    NAD  L hip:  Mild bloody staining on dressing.  Scant bleeding from proximal surgical wound.  No erythema.  No necrosis.  No purulence.  Grossly DNVI.    Hct 11/4: 29.9  WBC 11/4:  5.51    81 F POD #2 s/p IMN L hip fx.  -Doing well  -Post-op mgmt per Dr. De Luna  -Con't Bactrim for UTI  -Con't Xarelto then transition to aspirin after d/c  -OOB w/ PT  -WBAT LLE  -Dispo planning  -F/u Dr. De Luna 2 weeks post-op

## 2021-11-06 PROCEDURE — 97116 GAIT TRAINING THERAPY: CPT

## 2021-11-06 PROCEDURE — 97535 SELF CARE MNGMENT TRAINING: CPT

## 2021-11-06 PROCEDURE — 99024 POSTOP FOLLOW-UP VISIT: CPT | Performed by: ORTHOPAEDIC SURGERY

## 2021-11-06 PROCEDURE — 97110 THERAPEUTIC EXERCISES: CPT

## 2021-11-06 PROCEDURE — 97530 THERAPEUTIC ACTIVITIES: CPT

## 2021-11-06 RX ADMIN — DOCUSATE SODIUM 100 MG: 100 CAPSULE, LIQUID FILLED ORAL at 08:38

## 2021-11-06 RX ADMIN — SODIUM CHLORIDE, PRESERVATIVE FREE 10 ML: 5 INJECTION INTRAVENOUS at 08:40

## 2021-11-06 RX ADMIN — CARVEDILOL 3.12 MG: 3.12 TABLET, FILM COATED ORAL at 08:39

## 2021-11-06 RX ADMIN — SODIUM CHLORIDE, PRESERVATIVE FREE 10 ML: 5 INJECTION INTRAVENOUS at 20:50

## 2021-11-06 RX ADMIN — SULFAMETHOXAZOLE AND TRIMETHOPRIM 2 TABLET: 400; 80 TABLET ORAL at 20:50

## 2021-11-06 RX ADMIN — ATORVASTATIN CALCIUM 10 MG: 10 TABLET, FILM COATED ORAL at 08:39

## 2021-11-06 RX ADMIN — DOCUSATE SODIUM 50 MG AND SENNOSIDES 8.6 MG 2 TABLET: 8.6; 5 TABLET, FILM COATED ORAL at 08:39

## 2021-11-06 RX ADMIN — SULFAMETHOXAZOLE AND TRIMETHOPRIM 2 TABLET: 400; 80 TABLET ORAL at 08:39

## 2021-11-06 RX ADMIN — CARVEDILOL 3.12 MG: 3.12 TABLET, FILM COATED ORAL at 17:57

## 2021-11-06 RX ADMIN — DOCUSATE SODIUM 50 MG AND SENNOSIDES 8.6 MG 2 TABLET: 8.6; 5 TABLET, FILM COATED ORAL at 20:49

## 2021-11-06 RX ADMIN — POTASSIUM CHLORIDE 20 MEQ: 750 CAPSULE, EXTENDED RELEASE ORAL at 08:38

## 2021-11-06 RX ADMIN — DOCUSATE SODIUM 100 MG: 100 CAPSULE, LIQUID FILLED ORAL at 20:50

## 2021-11-06 RX ADMIN — SODIUM CHLORIDE, PRESERVATIVE FREE 3 ML: 5 INJECTION INTRAVENOUS at 20:51

## 2021-11-06 RX ADMIN — HYDROCODONE BITARTRATE AND ACETAMINOPHEN 1 TABLET: 5; 325 TABLET ORAL at 08:39

## 2021-11-06 RX ADMIN — HYDROCODONE BITARTRATE AND ACETAMINOPHEN 1 TABLET: 5; 325 TABLET ORAL at 15:02

## 2021-11-06 RX ADMIN — RIVAROXABAN 10 MG: 10 TABLET, FILM COATED ORAL at 08:38

## 2021-11-06 NOTE — PLAN OF CARE
Goal Outcome Evaluation:  Plan of Care Reviewed With: patient        Progress: improving  Outcome Summary: Pt min A sup>sit E post bathing SBA UB/Min A LB longsitting in bed. Pt edu on use of sock aid/LH reacher min/mod A with further edu needed for success. Pt sit<>stand Mod A to donnpants and Mod A to t/f EOB>chair with RW.

## 2021-11-06 NOTE — PLAN OF CARE
Goal Outcome Evaluation:  Plan of Care Reviewed With: patient        Progress: improving  Outcome Summary: pt sit>sup with min assist, sit<>stand with min assist, pt ambulated 20` x 2 with RW & min assist. pt would benefit from 24/7 care & continued PT services @ D/C

## 2021-11-06 NOTE — PLAN OF CARE
Goal Outcome Evaluation:        Alert and oriented, able to make needs known to staff. C/O pain x1 this shift, PRN pain med given, eff. Purewick in place for voiding, adequate output noted. Assist x1 with walker  and gait belt needed for tranfers. Pt noted with an unsteady gait during ambulation. Pt could benefit from rehab to improve gait to prevent another fall.

## 2021-11-06 NOTE — PROGRESS NOTES
Ortho PN     POD #3 s/p IMN L hip.  No complaints.  Is OOB and sitting in chair this morning reading newspaper.     98.2  76-87  107-133/54-68  16-18  96-99% RA     NAD  L hip:  Small spot of bloody staining on dressing.  Scant bleeding from proximal surgical wound.  No erythema.  No necrosis.  No purulence.  Grossly DNVI.     No new labs     81 F POD #3 s/p IMN L hip fx.  -Doing well  -Post-op mgmt per Dr. De Luna  -Daily dressing change  -Con't Bactrim for UTI  -Con't Xarelto then transition to aspirin after d/c  -OOB w/ PT  -WBAT LLE  -Dispo planning  -F/u Dr. De Luna 2 weeks post-op

## 2021-11-06 NOTE — PROGRESS NOTES
[facility]  INPATIENT PROGRESS NOTE    Length of Stay: 4  Date of Admission: 11/2/2021  Primary Care Physician: Provider, No Known    Subjective   Chief Complaint: dizzy   HPI:  81 year old female with past medical history of HTN, HLD who presented on 11/2/2021 after suffering a fall that resulted in left hip fracture.  Fracture was repaired on 11/3 by Dr. De Luna.  During today's visit, patient denies any complaints aside mild post op pain that is tolerable with current meds.    Review of Systems   Constitutional: Negative for chills, fatigue and fever.   HENT: Negative for congestion, rhinorrhea and sore throat.    Respiratory: Negative for cough, chest tightness, shortness of breath and wheezing.    Cardiovascular: Negative for chest pain, palpitations and leg swelling.   Gastrointestinal: Negative for abdominal pain, diarrhea, nausea and vomiting.   Musculoskeletal: Negative for back pain and neck pain.   Skin: Negative for pallor.   Neurological: Negative for dizziness, weakness and headaches.   Psychiatric/Behavioral: Negative for confusion. The patient is not nervous/anxious.         All pertinent negatives and positives are as above. All other systems have been reviewed and are negative unless otherwise stated.     Objective    Temp:  [96.8 °F (36 °C)-98.2 °F (36.8 °C)] 98.2 °F (36.8 °C)  Heart Rate:  [78-86] 78  Resp:  [16-18] 18  BP: (107-122)/(54-63) 118/62    Physical Exam  Vitals and nursing note reviewed.   Constitutional:       General: She is not in acute distress.     Appearance: Normal appearance. She is not ill-appearing.   HENT:      Head: Normocephalic and atraumatic.      Right Ear: External ear normal.      Left Ear: External ear normal.      Nose: Nose normal.      Mouth/Throat:      Mouth: Mucous membranes are moist.      Pharynx: Oropharynx is clear.   Eyes:      General: No scleral icterus.        Right eye: No discharge.          Left eye: No discharge.      Conjunctiva/sclera: Conjunctivae normal.   Cardiovascular:      Rate and Rhythm: Normal rate and regular rhythm.      Pulses: Normal pulses.      Heart sounds: Normal heart sounds. No murmur heard.  No friction rub. No gallop.    Pulmonary:      Effort: Pulmonary effort is normal. No respiratory distress.      Breath sounds: Normal breath sounds. No stridor. No wheezing, rhonchi or rales.   Abdominal:      General: Bowel sounds are normal. There is no distension.      Palpations: Abdomen is soft.      Tenderness: There is no abdominal tenderness.   Musculoskeletal:         General: No swelling. Normal range of motion.      Cervical back: Normal range of motion and neck supple.   Skin:     General: Skin is warm and dry.      Comments: Left hip dressing intact.  Small amount of sanguinous drainage noted.   Neurological:      General: No focal deficit present.      Mental Status: She is alert and oriented to person, place, and time.   Psychiatric:         Mood and Affect: Mood normal.         Behavior: Behavior normal.             Results Review:  I have reviewed the labs, radiology results, and diagnostic studies.    Laboratory Data:   Results from last 7 days   Lab Units 11/04/21 0453 11/03/21 0525 11/02/21  1451   SODIUM mmol/L 135* 133* 138   POTASSIUM mmol/L 3.5 3.8 3.5   CHLORIDE mmol/L 104 100 103   CO2 mmol/L 25.0 24.0 25.0   BUN mg/dL 8 11 13   CREATININE mg/dL 0.68 0.59 0.78   GLUCOSE mg/dL 119* 118* 122*   CALCIUM mg/dL 8.5* 8.9 9.4   BILIRUBIN mg/dL  --  1.0 0.6   ALK PHOS U/L  --  32* 40   ALT (SGPT) U/L  --  14 17   AST (SGOT) U/L  --  22 35*   ANION GAP mmol/L 6.0 9.0 10.0     Estimated Creatinine Clearance: 54.2 mL/min (by C-G formula based on SCr of 0.68 mg/dL).          Results from last 7 days   Lab Units 11/04/21 0453 11/03/21  0525 11/02/21  1451   WBC 10*3/mm3 5.51 6.56 8.71   HEMOGLOBIN g/dL 9.9* 11.1* 13.0   HEMATOCRIT % 29.9* 32.8* 39.1   PLATELETS 10*3/mm3 113*  152 158           Culture Data:   No results found for: BLOODCX  Urine Culture   Date Value Ref Range Status   11/03/2021 No growth  Final     No results found for: RESPCX  No results found for: WOUNDCX  No results found for: STOOLCX  No components found for: BODYFLD    Radiology Data:   Imaging Results (Last 24 Hours)     ** No results found for the last 24 hours. **          I have reviewed the patient's current medications.     Assessment/Plan     Active Hospital Problems    Diagnosis    • **Closed displaced fracture of base of neck of left femur (HCC)      Added automatically from request for surgery [id number]     • Displaced fracture of left femoral neck (HCC)    • Primary hypertension        Plan:    1. Left femur fracture: s/p surgical repair with Dr. De Luna on 11/3.  Continue PT/OT.  Continue pain control.  Continue Xarelto for DVT prevention while hospitalized with plans for transition to BID baby aspirin dosing at discharge per Dr. De Luna.  2. Dizziness/orthostasis: resolved.  encourage PO intake.   3. HLD: continue statin.    4. Acute cystitis: currently on Bactrim.  No growth on urine culture    Discharge Planning: plans for home with home health PT/OT and 24/7 care when safe with ambulation.  Has not yet worked with PT today.     I confirmed that the patient's Advance Care Plan is present, code status is documented, or surrogate decision maker is listed in the patient's medical record.            This document has been electronically signed by EUGENE Juarze on November 6, 2021 13:17 CDT

## 2021-11-06 NOTE — THERAPY TREATMENT NOTE
Acute Care - Physical Therapy Treatment Note  [facility]     Patient Name: Corina Lenz  : 1940  MRN: [id number]  Today's Date: 2021      Visit Dx:     ICD-10-CM ICD-9-CM   1. Displaced fracture of left femoral neck (HCC)  S72.002A 820.8   2. Closed displaced basicervical fracture of left femur, initial encounter ([facility])  S72.042A 820.03   3. Impaired mobility and ADLs  Z74.09 V49.89    Z78.9    4. Impaired functional mobility, balance, gait, and endurance  Z74.09 V49.89   5. Closed displaced basicervical fracture of left femur with routine healing, subsequent encounter  S72.042D V54.13     Patient Active Problem List   Diagnosis   • Displaced fracture of left femoral neck (HCC)   • Primary hypertension   • Closed displaced fracture of base of neck of left femur (HCC)     Past Medical History:   Diagnosis Date   • CHF (congestive heart failure) (HCC)    • HLD (hyperlipidemia)    • Hypertension      Past Surgical History:   Procedure Laterality Date   • HIP TROCHANTERIC NAILING WITH INTRAMEDULLARY HIP SCREW Left 11/3/2021    Procedure: HIP TROCHANTERIC NAILING SHORT WITH INTRAMEDULLARY HIP SCREW;  Surgeon: Leobardo De Luna MD;  Location: [facility];  Service: Orthopedics;  Laterality: Left;     PT Assessment (last 12 hours)     PT Evaluation and Treatment     Row Name 21 1343          Physical Therapy Time and Intention    Subjective Information no complaints  -TA     Document Type therapy note (daily note)  -TA     Mode of Treatment individual therapy; physical therapy  -TA     Row Name 21 134          General Information    Patient Profile Reviewed yes  -TA     Existing Precautions/Restrictions fall  -TA     Row Name 21 1343          Cognition    Orientation Status (Cognition) oriented x 4  -TA     Personal Safety Interventions fall prevention program maintained; gait belt; muscle strengthening facilitated; nonskid shoes/slippers when out of bed; supervised activity  -TA     Row  Name 11/06/21 1343          Pain Scale: Numbers Pre/Post-Treatment    Pretreatment Pain Rating 0/10 - no pain  -TA     Posttreatment Pain Rating 0/10 - no pain  -TA     Row Name 11/06/21 1343          Range of Motion Comprehensive    General Range of Motion bilateral lower extremity ROM WFL  -TA     Row Name 11/06/21 1343          Strength Comprehensive (MMT)    General Manual Muscle Testing (MMT) Assessment other (see comments)  -TA     Row Name 11/06/21 1343          Mobility    Extremity Weight-bearing Status left lower extremity  -TA     Left Lower Extremity (Weight-bearing Status) weight-bearing as tolerated (WBAT)  -TA     Row Name 11/06/21 1343          Bed Mobility    Bed Mobility supine-sit; scooting/bridging; rolling right; rolling left  -TA     Rolling Left [place] (Bed Mobility) not tested  -TA     Rolling Right [place] (Bed Mobility) not tested  -TA     Supine-Sit [place] (Bed Mobility) not tested  -TA     Sit-Supine [place] (Bed Mobility) minimum assist (75% patient effort); 1 person assist  -TA     Assistive Device (Bed Mobility) bed rails; head of bed elevated; draw sheet  -TA     Row Name 11/06/21 1343          Transfers    Transfers sit-stand transfer; stand-sit transfer; chair-bed transfer  -TA     Chair-Bed [place] (Transfers) minimum assist (75% patient effort)  -TA     Sit-Stand [place] (Transfers) minimum assist (75% patient effort)  -TA     Stand-Sit [place] (Transfers) minimum assist (75% patient effort)  -TA     [place] Level (Toilet Transfer) minimum assist (75% patient effort); 1 person to manage equipment  -     Row Name 11/06/21 1343          Chair-Bed Transfer    Assistive Device (Chair-Bed Transfers) walker, front-wheeled  -TA     Row Name 11/06/21 1343          Sit-Stand Transfer    Assistive Device (Sit-Stand Transfers) walker, front-wheeled  -TA     Row Name 11/06/21 1343          Stand-Sit Transfer    Assistive Device (Stand-Sit Transfers)  walker, front-wheeled  -TA     Row Name 11/06/21 1343          Toilet Transfer    Type (Toilet Transfer) sit-stand; stand-sit  -TA     Row Name 11/06/21 1343          Gait/Stairs (Locomotion)    [place] Level (Gait) minimum assist (75% patient effort); maximum assist (25% patient effort)  -TA     Assistive Device (Gait) walker, front-wheeled  -TA     Distance in Feet (Gait) 20` x 2  -TA     Row Name 11/06/21 1343          Safety Issues, Functional Mobility    Impairments Affecting Function (Mobility) strength; endurance/activity tolerance; balance; range of motion (ROM); pain  -TA     Row Name 11/06/21 1343          Hip (Therapeutic Exercise)    Hip (Therapeutic Exercise) isometric exercises  -TA     Hip Isometrics (Therapeutic Exercise) bilateral; gluteal sets; sitting; 10 repetitions; 5 repetitions  -TA     Row Name 11/06/21 1343          Knee (Therapeutic Exercise)    Knee (Therapeutic Exercise) AROM (active range of motion)  -TA     Knee AROM (Therapeutic Exercise) bilateral; LAQ (long arc quad); sitting; 10 repetitions; 5 repetitions  -TA     Row Name 11/06/21 1343          Ankle (Therapeutic Exercise)    Ankle (Therapeutic Exercise) AROM (active range of motion)  -TA     Ankle AROM (Therapeutic Exercise) bilateral; dorsiflexion; plantarflexion; sitting; 10 repetitions; 5 repetitions  -TA     Row Name             Wound 11/03/21 Left lateral hip Incision    Wound - Properties Group Placement Date: 11/03/21  - Present on Hospital Admission: N  -KB Side: Left  -KB Orientation: lateral  -KB Location: hip  -KB, x2  Primary Wound Type: Incision  -KB     Retired Wound - Properties Group Date first assessed: 11/03/21  -KB Present on Hospital Admission: N  -KB Side: Left  -KB Location: hip  -KB, x2  Primary Wound Type: Incision  -KB     Row Name 11/06/21 1343          Plan of Care Review    Plan of Care Reviewed With patient  -TA     Outcome Summary pt sit>sup with min assist, sit<>stand with min assist, pt  ambulated 20` x 2 with RW & min assist. pt would benefit from 24/7 care & continued PT services @ D/C  -TA     Row Name 11/06/21 1343          Vital Signs    Pre Systolic BP Rehab 141  -TA     Pre Treatment Diastolic BP 72  -TA     Post Systolic BP Rehab 141  -TA     Post Treatment Diastolic BP 74  -TA     Pretreatment Heart Rate (beats/min) 82  -TA     Intratreatment Heart Rate (beats/min) 100  -TA     Posttreatment Heart Rate (beats/min) 85  -TA     Pre SpO2 (%) 97  -TA     O2 Delivery Pre Treatment room air  -TA     Intra SpO2 (%) 96  -TA     O2 Delivery Intra Treatment room air  -TA     Post SpO2 (%) 85  -TA     O2 Delivery Post Treatment room air  -TA     Pre Patient Position Sitting  -TA     Post Patient Position Supine  -TA     Row Name 11/06/21 1343          Bed Mobility Goal 1 (PT)    Activity/Assistive Device (Bed Mobility Goal 1, PT) sit to supine/supine to sit  -TA     [place] Level/Cues Needed (Bed Mobility Goal 1, PT) independent  -TA     Time Frame (Bed Mobility Goal 1, PT) by discharge  -TA     Strategies/Barriers (Bed Mobility Goal 1, PT) L hip ORIF, WBAT.  -TA     Progress/Outcomes (Bed Mobility Goal 1, PT) goal not met  -TA     Row Name 11/06/21 1343          Transfer Goal 1 (PT)    Activity/Assistive Device (Transfer Goal 1, PT) sit-to-stand/stand-to-sit; bed-to-chair/chair-to-bed  -TA     [place] Level/Cues Needed (Transfer Goal 1, PT) modified independence  -TA     Time Frame (Transfer Goal 1, PT) by discharge  -TA     Strategies/Barriers (Transfers Goal 1, PT) L hip ORIF, WBAT.  -TA     Progress/Outcome (Transfer Goal 1, PT) goal not met  -TA     Row Name 11/06/21 1343          Gait Training Goal 1 (PT)    Activity/Assistive Device (Gait Training Goal 1, PT) walker, rolling  -TA     [place] Level (Gait Training Goal 1, PT) modified independence  -TA     Distance (Gait Training Goal 1, PT) 100' x 1.  -TA     Time Frame (Gait Training Goal 1, PT) by discharge  -TA      Strategies/Barriers (Gait Training Goal 1, PT) L hip ORIF, WBAT. Orthostatic hypotension during evaluation.  -TA     Progress/Outcome (Gait Training Goal 1, PT) goal not met  -TA     Row Name 11/06/21 1343          Positioning and Restraints    Pre-Treatment Position sitting in chair/recliner  -TA     In Bed supine; call light within reach; exit alarm on  -TA     Row Name 11/06/21 1343          Therapy Assessment/Plan (PT)    Rehab Potential (PT) good, to achieve stated therapy goals  -TA     Criteria for Skilled Interventions Met (PT) yes; skilled treatment is necessary  -TA     Comment, Therapy Assessment/Plan (PT) continue  -TA           User Key  (r) = Recorded By, (t) = Taken By, (c) = Cosigned By    Initials Name Provider Type    Lyn Garvin PTA Physical Therapy Assistant    Lexus Dempsey RN Registered Nurse              Physical Therapy Education                 Title: PT OT SLP Therapies (In Progress)     Topic: Physical Therapy (In Progress)     Point: Mobility training (Done)     Learning Progress Summary           Patient Acceptance, E, VU,NR by YUE at 11/4/2021 1001    Comment: PT POC, rehab process, use of walker.                   Point: Home exercise program (Not Started)     Learner Progress:  Not documented in this visit.          Point: Body mechanics (Not Started)     Learner Progress:  Not documented in this visit.          Point: Precautions (Not Started)     Learner Progress:  Not documented in this visit.                      User Key     Initials Effective Dates Name Provider Type Discipline     06/16/21 -  Gabriel Salguero, PT Physical Therapist PT              PT Recommendation and Plan  Anticipated Discharge Disposition (PT): home with home health, home with 24/7 care, home with assist, skilled nursing facility  Therapy Frequency (PT): daily  Plan of Care Reviewed With: patient  Progress: improving  Outcome Summary: pt sit>sup with min assist, sit<>stand with min assist, pt  ambulated 20` x 2 with RW & min assist. pt would benefit from 24/7 care & continued PT services @ D/C   Outcome Measures     Row Name 11/06/21 1600 11/05/21 1200          How much help from another person do you currently need...    Turning from your back to your side while in flat bed without using bedrails? 3  -TA 3  -TA     Moving from lying on back to sitting on the side of a flat bed without bedrails? 3  -TA 3  -TA     Moving to and from a bed to a chair (including a wheelchair)? 3  -TA 3  -TA     Standing up from a chair using your arms (e.g., wheelchair, bedside chair)? 3  -TA 3  -TA     Climbing 3-5 steps with a railing? 2  -TA 2  -TA     To walk in hospital room? 3  -TA 3  -TA     AM-PAC 6 Clicks Score (PT) 17  -TA 17  -TA            Functional Assessment    Outcome Measure Options AM-PAC 6 Clicks Basic Mobility (PT)  -TA AM-PAC 6 Clicks Basic Mobility (PT)  -TA           User Key  (r) = Recorded By, (t) = Taken By, (c) = Cosigned By    Initials Name Provider Type    Lyn Garvin PTA Physical Therapy Assistant                 Time Calculation:    PT Charges     Row Name 11/06/21 1633             Time Calculation    Start Time 1343  -TA      Stop Time 1423  -TA      Time Calculation (min) 40 min  -TA      PT Received On 11/06/21  -TA              Time Calculation- PT    Total Timed Code Minutes- PT 40 minute(s)  -TA              Timed Charges    76945 - PT Therapeutic Exercise Minutes 15  -TA      97811 - Gait Training Minutes  15  -TA      87653 - PT Therapeutic Activity Minutes 10  -TA              Total Minutes    Timed Charges Total Minutes 40  -TA       Total Minutes 40  -TA            User Key  (r) = Recorded By, (t) = Taken By, (c) = Cosigned By    Initials Name Provider Type    Lyn Garvin PTA Physical Therapy Assistant              Therapy Charges for Today     Code Description Service Date Service Provider Modifiers Qty    49886110543 HC GAIT TRAINING EA 15 MIN 11/5/2021 Sandhya  Lyn VERGARA, PTA GP 1    49442757620 HC PT THERAPEUTIC ACT EA 15 MIN 11/5/2021 Lyn Arthur, PTA GP 1    39788384480 HC PT THER PROC EA 15 MIN 11/6/2021 Lyn Arthur, PTA GP 1    47712129125 HC GAIT TRAINING EA 15 MIN 11/6/2021 Lyn Arthur, PTA GP 1    91088621554 HC PT THERAPEUTIC ACT EA 15 MIN 11/6/2021 Lyn Arthur, PTA GP 1          PT G-Codes  Outcome Measure Options: AM-PAC 6 Clicks Basic Mobility (PT)  AM-PAC 6 Clicks Score (PT): 17  AM-PAC 6 Clicks Score (OT): 18    Lyn Arthur PTA  11/6/2021

## 2021-11-06 NOTE — THERAPY TREATMENT NOTE
Patient Name: Corina Lenz  : 1940    MRN: [id number]                              Today's Date: 2021       Admit Date: 2021    Visit Dx:     ICD-10-CM ICD-9-CM   1. Displaced fracture of left femoral neck (HCC)  S72.002A 820.8   2. Closed displaced basicervical fracture of left femur, initial encounter ([facility])  S72.042A 820.03   3. Impaired mobility and ADLs  Z74.09 V49.89    Z78.9    4. Impaired functional mobility, balance, gait, and endurance  Z74.09 V49.89   5. Closed displaced basicervical fracture of left femur with routine healing, subsequent encounter  S72.042D V54.13     Patient Active Problem List   Diagnosis   • Displaced fracture of left femoral neck (HCC)   • Primary hypertension   • Closed displaced fracture of base of neck of left femur (HCC)     Past Medical History:   Diagnosis Date   • CHF (congestive heart failure) (HCC)    • HLD (hyperlipidemia)    • Hypertension      Past Surgical History:   Procedure Laterality Date   • HIP TROCHANTERIC NAILING WITH INTRAMEDULLARY HIP SCREW Left 11/3/2021    Procedure: HIP TROCHANTERIC NAILING SHORT WITH INTRAMEDULLARY HIP SCREW;  Surgeon: Leobardo De Luna MD;  Location: [facility];  Service: Orthopedics;  Laterality: Left;      General Information     Row Name 2145          OT Time and Intention    Document Type therapy note (daily note)  -TO     Mode of Treatment individual therapy; occupational therapy  -TO     Row Name 21          General Information    Patient Profile Reviewed yes  -TO     Existing Precautions/Restrictions fall  -TO     Row Name 21          Cognition    Orientation Status (Cognition) oriented x 4  -TO     Row Name 21          Safety Issues, Functional Mobility    Impairments Affecting Function (Mobility) strength; endurance/activity tolerance; balance; range of motion (ROM); pain  -TO           User Key  (r) = Recorded By, (t) = Taken By, (c) = Cosigned By    Initials Name Provider  Type    TO Yovany Israel COTA Occupational Therapy Assistant                 Mobility/ADL's     Row Name 11/06/21 08          Bed Mobility    Bed Mobility supine-sit  -TO     Rolling Left [place] (Bed Mobility) --  -TO     Rolling Right [place] (Bed Mobility) --  -TO     Scooting/Bridging [place] (Bed Mobility) --  -TO     Supine-Sit [place] (Bed Mobility) minimum assist (75% patient effort); verbal cues; nonverbal cues (demo/gesture)  -TO     Sit-Supine [place] (Bed Mobility) --  -TO     Assistive Device (Bed Mobility) bed rails; head of bed elevated; draw sheet  -TO     Row Name 11/06/21 0845          Transfers    Transfers bed-chair transfer  -TO     Bed-Chair [place] (Transfers) moderate assist (50% patient effort)  -TO     Sit-Stand [place] (Transfers) moderate assist (50% patient effort)  -TO     [place] Level (Toilet Transfer) --  -TO     Row Name 11/06/21 08          Sit-Stand Transfer    Assistive Device (Sit-Stand Transfers) walker, front-wheeled  -TO     Row Name 11/06/21 08          Toilet Transfer    Type (Toilet Transfer) --  -TO     Row Name 11/06/21 08          Functional Mobility    Functional Mobility- Ind. Level --  -TO     Functional Mobility- Device --  -TO     Row Name 11/06/21 08          Activities of Daily Living    BADL Assessment/Intervention bathing; upper body dressing; lower body dressing; grooming  -TO     Row Name 11/06/21 0845          Mobility    Extremity Weight-bearing Status left lower extremity  -TO     Left Lower Extremity (Weight-bearing Status) weight-bearing as tolerated (WBAT)  -TO     Row Name 11/06/21 08          Grooming Assessment/Training    [place] Level (Grooming) set up; grooming skills; hair care, combing/brushing; oral care regimen; wash face, hands  -TO     Position (Grooming) edge of bed sitting; supported sitting  -TO     Row Name 11/06/21 0845          Lower Body Dressing Assessment/Training     [place] Level (Lower Body Dressing) doff; don; socks; pants/bottoms; moderate assist (50% patient effort)  -TO     Assistive Devices (Lower Body Dressing) sock-aid; reacher  -TO     Position (Lower Body Dressing) supine  -TO     Comment (Lower Body Dressing) will need repeat emo for sock aid/lh reacher  -TO     Row Name 11/06/21 0845          Toileting Assessment/Training    [place] Level (Toileting) --  -TO     [facility] Name 11/06/21 08          Bathing Assessment/Intervention    [place] Level (Bathing) bathing skills; lower body; upper body; standby assist; minimum assist (75% patient effort)  -TO     Assistive Devices (Bathing) bath mitt  -TO     Position (Bathing) long sitting  -TO     [facility] Name 11/06/21 08          Upper Body Dressing Assessment/Training    [place] Level (Upper Body Dressing) upper body dressing skills; doff; don; standby assist  HG  -TO     Position (Upper Body Dressing) long sitting  -TO           User Key  (r) = Recorded By, (t) = Taken By, (c) = Cosigned By    Initials Name Provider Type    TO Yovany Israel COTA Occupational Therapy Assistant               Obj/Interventions     Row Name 11/06/21 [facility]          Vision Assessment/Intervention    Visual Impairment/Limitations corrective lenses full-time  -TO     [facility] Name 11/06/21 0845          Range of Motion Comprehensive    General Range of Motion bilateral lower extremity ROM WFL  -TO     Row Name 11/06/21 0845          Strength Comprehensive (MMT)    General Manual Muscle Testing (MMT) Assessment other (see comments)  -TO           User Key  (r) = Recorded By, (t) = Taken By, (c) = Cosigned By    Initials Name Provider Type    TO Yovany Israel COTA Occupational Therapy Assistant               Goals/Plan     Row Name 11/06/21 0845          Transfer Goal 1 (OT)    Activity/Assistive Device (Transfer Goal 1, OT) toilet  -TO     [place] Level/Cues Needed (Transfer Goal 1, OT) minimum assist (75% or more patient  effort)  -TO     Time Frame (Transfer Goal 1, OT) long term goal (LTG); by discharge  -TO     Progress/Outcome (Transfer Goal 1, OT) goal not met  -TO     Row Name 11/06/21 0845          Bathing Goal 1 (OT)    Activity/Device (Bathing Goal 1, OT) lower body bathing  -TO     [place] Level/Cues Needed (Bathing Goal 1, OT) standby assist  -TO     Time Frame (Bathing Goal 1, OT) long term goal (LTG); by discharge  -TO     Progress/Outcomes (Bathing Goal 1, OT) goal not met  -TO     Row Name 11/06/21 0845          Dressing Goal 1 (OT)    Activity/Device (Dressing Goal 1, OT) lower body dressing  -TO     [place]/Cues Needed (Dressing Goal 1, OT) standby assist; minimum assist (75% or more patient effort)  -TO     Time Frame (Dressing Goal 1, OT) long term goal (LTG); by discharge  -TO     Progress/Outcome (Dressing Goal 1, OT) goal not met  -TO     Row Name 11/06/21 0845          Toileting Goal 1 (OT)    [place] Level/Cues Needed (Toileting Goal 1, OT) minimum assist (75% or more patient effort)  -TO     Time Frame (Toileting Goal 1, OT) long term goal (LTG); by discharge  -TO     Progress/Outcome (Toileting Goal 1, OT) goal not met  -TO           User Key  (r) = Recorded By, (t) = Taken By, (c) = Cosigned By    Initials Name Provider Type    TO Yovany Israel COTA Occupational Therapy Assistant               Clinical Impression     Row Name 11/06/21 0845          Pain Scale: Numbers Pre/Post-Treatment    Pretreatment Pain Rating 0/10 - no pain  -TO     Posttreatment Pain Rating 0/10 - no pain  -TO     Row Name 11/06/21 0845          Pain Scale: FACES Pre/Post-Treatment    Pain: FACES Scale, Pretreatment 0-->no hurt  -TO     Posttreatment Pain Rating 0-->no hurt  -TO     Row Name 11/06/21 0845          Plan of Care Review    Plan of Care Reviewed With patient  -TO     Progress improving  -TO     Outcome Summary Pt min A sup>sit E post bathing SBA UB/Min A LB longsitting in bed. Pt edu on use of sock  aid/LH reacher min/mod A with further edu needed for success. Pt sit<>stand Mod A to donnpants and Mod A to t/f EOB>chair with RW.  -TO     Row Name 11/06/21 0845          Therapy Assessment/Plan (OT)    Rehab Potential (OT) good, to achieve stated therapy goals  -TO     Criteria for Skilled Therapeutic Interventions Met (OT) yes; meets criteria; skilled treatment is necessary  -TO     Therapy Frequency (OT) daily  -TO     Row Name 11/06/21 0845          Therapy Plan Review/Discharge Plan (OT)    Anticipated Discharge Disposition (OT) inpatient rehabilitation facility; skilled nursing facility  -TO     Row Name 11/06/21 0845          Vital Signs    Pre Systolic BP Rehab 149  -TO     Pre Treatment Diastolic BP 84  -TO     Post Systolic BP Rehab 118  -TO     Post Treatment Diastolic BP 73  -TO     Pretreatment Heart Rate (beats/min) 87  -TO     Posttreatment Heart Rate (beats/min) 88  -TO     Pre SpO2 (%) 98  -TO     O2 Delivery Pre Treatment room air  -TO     Post SpO2 (%) 96  -TO     O2 Delivery Post Treatment room air  -TO     Pre Patient Position Supine  -TO     Intra Patient Position Sitting  -TO     Post Patient Position Sitting  -TO     Row Name 11/06/21 0845          Positioning and Restraints    Post Treatment Position chair  -TO     In Chair notified nsg; reclined; call light within reach; encouraged to call for assist  -TO           User Key  (r) = Recorded By, (t) = Taken By, (c) = Cosigned By    Initials Name Provider Type    TO Yovany Israel COTA Occupational Therapy Assistant               Outcome Measures     Row Name 11/06/21 0845          How much help from another is currently needed...    Putting on and taking off regular lower body clothing? 2  -TO     Bathing (including washing, rinsing, and drying) 3  -TO     Toileting (which includes using toilet bed pan or urinal) 2  -TO     Putting on and taking off regular upper body clothing 3  -TO     Taking care of personal grooming (such as brushing  teeth) 4  -TO     Eating meals 4  -TO     AM-PAC 6 Clicks Score (OT) 18  -TO           User Key  (r) = Recorded By, (t) = Taken By, (c) = Cosigned By    Initials Name Provider Type    TO Yovany Israel COTA Occupational Therapy Assistant                Occupational Therapy Education                 Title: PT OT SLP Therapies (In Progress)     Topic: Occupational Therapy (In Progress)     Point: ADL training (Not Started)     Description:   Instruct learner(s) on proper safety adaptation and remediation techniques during self care or transfers.   Instruct in proper use of assistive devices.              Learner Progress:  Not documented in this visit.          Point: Home exercise program (Not Started)     Description:   Instruct learner(s) on appropriate technique for monitoring, assisting and/or progressing therapeutic exercises/activities.              Learner Progress:  Not documented in this visit.          Point: Precautions (Done)     Description:   Instruct learner(s) on prescribed precautions during self-care and functional transfers.              Learning Progress Summary           Patient Acceptance, E, VU by ME at 11/4/2021 0935    Comment: Educated on OT And POC. Educated to call for assistance. Educated on safety precautions.    Acceptance, E,TB, NR by  at 11/3/2021 1621    Comment: POC, role of OT, transfer training                   Point: Body mechanics (Done)     Description:   Instruct learner(s) on proper positioning and spine alignment during self-care, functional mobility activities and/or exercises.              Learning Progress Summary           Patient Acceptance, E, VU by ME at 11/4/2021 0935    Comment: Educated on OT And POC. Educated to call for assistance. Educated on safety precautions.    Acceptance, E,TB, NR by  at 11/3/2021 1621    Comment: POC, role of OT, transfer training                               User Key     Initials Effective Dates Name Provider Type [facility]    ME  06/16/21 -  Yolanda Avina OTR/L Occupational Therapist OT    [facility]J 06/14/21 -  Fazal Cheatham OT Occupational Therapist OT              OT Recommendation and Plan  Therapy Frequency (OT): daily  Plan of Care Review  Plan of Care Reviewed With: patient  Progress: improving  Outcome Summary: Pt min A sup>sit E post bathing SBA UB/Min A LB longsitting in bed. Pt edu on use of sock aid/LH reacher min/mod A with further edu needed for success. Pt sit<>stand Mod A to donnpants and Mod A to t/f EOB>chair with RW.     Time Calculation:    Time Calculation- OT     Row Name 11/06/21 1322             Time Calculation- OT    OT Start Time 0845  -TO      OT Stop Time 0943  -TO      OT Time Calculation (min) 58 min  -TO      Total Timed Code Minutes- OT 58 minute(s)  -TO      OT Received On 11/06/21  -TO              Timed Charges    70778 - OT Self Care/Mgmt Minutes 58  -TO              Total Minutes    Timed Charges Total Minutes 58  -TO       Total Minutes 58  -TO            User Key  (r) = Recorded By, (t) = Taken By, (c) = Cosigned By    Initials Name Provider Type    TO Yovany Israel COTA Occupational Therapy Assistant              Therapy Charges for Today     Code Description Service Date Service Provider Modifiers Qty    [id number] HC OT SELF CARE/MGMT/TRAIN EA 15 MIN 11/6/2021 Yovany Israel COTA GO 4               KING Whipple  11/6/2021

## 2021-11-06 NOTE — PLAN OF CARE
Goal Outcome Evaluation:  Plan of Care Reviewed With: patient        Progress: no change  Outcome Summary: Pt c/o pain, PRN medication administered and effective; v/s stable; will continue to monitor

## 2021-11-07 VITALS
RESPIRATION RATE: 18 BRPM | BODY MASS INDEX: 26.19 KG/M2 | OXYGEN SATURATION: 96 % | TEMPERATURE: 97.9 F | DIASTOLIC BLOOD PRESSURE: 55 MMHG | HEIGHT: 64 IN | HEART RATE: 87 BPM | WEIGHT: 153.4 LBS | SYSTOLIC BLOOD PRESSURE: 105 MMHG

## 2021-11-07 PROCEDURE — 97116 GAIT TRAINING THERAPY: CPT

## 2021-11-07 PROCEDURE — 97530 THERAPEUTIC ACTIVITIES: CPT

## 2021-11-07 PROCEDURE — 97110 THERAPEUTIC EXERCISES: CPT

## 2021-11-07 PROCEDURE — 99024 POSTOP FOLLOW-UP VISIT: CPT | Performed by: ORTHOPAEDIC SURGERY

## 2021-11-07 RX ORDER — ACETAMINOPHEN 325 MG/1
650 TABLET ORAL EVERY 4 HOURS PRN
Start: 2021-11-07

## 2021-11-07 RX ORDER — PSEUDOEPHEDRINE HCL 30 MG
100 TABLET ORAL 2 TIMES DAILY PRN
Start: 2021-11-07 | End: 2022-03-16

## 2021-11-07 RX ORDER — HYDROCODONE BITARTRATE AND ACETAMINOPHEN 5; 325 MG/1; MG/1
1 TABLET ORAL EVERY 4 HOURS PRN
Qty: 18 TABLET | Refills: 0 | Status: SHIPPED | OUTPATIENT
Start: 2021-11-07 | End: 2021-11-10

## 2021-11-07 RX ORDER — ASPIRIN 81 MG/1
81 TABLET ORAL 2 TIMES DAILY
Qty: 60 TABLET | Refills: 0
Start: 2021-11-07 | End: 2021-12-07

## 2021-11-07 RX ADMIN — DOCUSATE SODIUM 50 MG AND SENNOSIDES 8.6 MG 2 TABLET: 8.6; 5 TABLET, FILM COATED ORAL at 09:52

## 2021-11-07 RX ADMIN — HYDROCODONE BITARTRATE AND ACETAMINOPHEN 2 TABLET: 5; 325 TABLET ORAL at 03:29

## 2021-11-07 RX ADMIN — DOCUSATE SODIUM 100 MG: 100 CAPSULE, LIQUID FILLED ORAL at 09:52

## 2021-11-07 RX ADMIN — RIVAROXABAN 10 MG: 10 TABLET, FILM COATED ORAL at 09:52

## 2021-11-07 RX ADMIN — CARVEDILOL 3.12 MG: 3.12 TABLET, FILM COATED ORAL at 09:52

## 2021-11-07 RX ADMIN — ATORVASTATIN CALCIUM 10 MG: 10 TABLET, FILM COATED ORAL at 09:52

## 2021-11-07 RX ADMIN — POTASSIUM CHLORIDE 20 MEQ: 750 CAPSULE, EXTENDED RELEASE ORAL at 09:52

## 2021-11-07 NOTE — PLAN OF CARE
Goal Outcome Evaluation:         Pt alert this shift. Able to make needs known to staff. C/o severe pain x1 this shift. PRN Norco x2 was able to control pain. Ambulates with walker for BM. Purewick in place for voiding. Drsg change completed to surgical site. Scant amount of serous, yellow tinged drainage with no odor noted to site. Surgical incision well approximated. Staples in place with no redness to site or alejandra wound.

## 2021-11-07 NOTE — PROGRESS NOTES
"Ortho PN     POD #4 s/p IMN L hip.  Says she feels \"pretty good.\"  Was OOB this morning.     98.7  68-80  118-158/62-82  16-18  95-98% RA     NAD  L hip:  Dressing c/d/i.  No erythema.  No necrosis.  No purulence.  Grossly DNVI.     No new labs     81 F POD #4 s/p IMN L hip fx.  -Doing well  -Post-op mgmt per Dr. De Luna  -Daily dressing change  -Con't Bactrim for UTI  -Con't Xarelto then transition to aspirin after d/c  -OOB w/ PT  -WBAT LLE  -Dispo planning  -F/u Dr. De Luna 2 weeks post-op  "

## 2021-11-07 NOTE — DISCHARGE SUMMARY
[facility] Medicine Services  DISCHARGE SUMMARY       Date of Admission: 11/2/2021  Date of Discharge:  11/7/2021  Primary Care Physician: Provider, No Known    Presenting Problem/History of Present Illness:  Displaced fracture of left femoral neck (HCC) [S72.002A]       Final Discharge Diagnoses:  Active Hospital Problems    Diagnosis    • **Closed displaced fracture of base of neck of left femur (HCC)      Added automatically from request for surgery [id number]     • Displaced fracture of left femoral neck (HCC)    • Primary hypertension        Consults:   Consults     Date and Time Order Name Status Description    11/2/2021  4:46 PM Inpatient Orthopedic Surgery Consult            Procedures Performed: Procedure(s):  HIP TROCHANTERIC NAILING SHORT WITH INTRAMEDULLARY HIP SCREW                Pertinent Test Results:   Lab Results (last 24 hours)     ** No results found for the last 24 hours. **        Imaging Results (All)     Procedure Component Value Units Date/Time    XR Hip With or Without Pelvis 2 - 3 View Left [[id number]] Collected: 11/03/21 1432     Updated: 11/03/21 1516    Narrative:      Procedure:  Left hip        Indication:  Postop fracture internal fixation..    Technique:  2 views   .    Prior relevant exam: Left hip November 2, 2021..    Left hip fracture without angulation or deformity. Alignment is  significantly improved in comparison with prereduction views.  Alignment is maintained by a lag screw through  the femoral neck  attached to a short intramedullary maryanne through the proximal  femoral shaft.     Skin staples are noted in the lateral aspect left hip and  proximal femur at the incision sites.      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      Impression:      As above.    Electronically signed by:  Timo Looney MD  11/3/2021 3:14 PM CDT  Workstation: [id number]    FL C Arm During Surgery [[id number]] Resulted: 11/03/21 8940      Updated: 11/03/21 1459    CT Lower Extremity Left Without Contrast [[id number]] Collected: 11/02/21 1553     Updated: 11/02/21 1731    Narrative:      PROCEDURE: CT LOWER EXTREMITY WITHOUT IV CONTRAST    TECHNIQUE: Multichannel computed tomography of the left hip  without contrast.  Coronal and sagittal reformatted images were also obtained to  improve fracture detection.    Contrast: None    This exam was performed using radiation doses that are as low as  reasonably achievable (ALARA).  This exam was performed according to our departmental dose  optimization program, which includes automated exposure control,  adjustment of the mA and/or KV according to patient size and/or  use of iterative reconstruction technique.    COMPARISON: Left hip radiographs performed the same date.    HISTORY: further eval of left hip fracture per ortho, S72.002A  Fracture of unspecified part of neck of left femur, initial  encounter for closed fracture    FINDINGS  There is a comminuted left intertrochanteric fracture to the base  of the femoral neck. There are degenerative changes of the left  hip joint. There is a moderate amount stool in the rectum. There  is a left adnexal cystic structure measuring 2.9 x 2.6 cm.  Recommend pelvic ultrasound in 6-12 weeks.      Impression:      CONCLUSION:   Comminuted left intertrochanteric proximal femoral fracture also  involving the base of the femoral neck.  Degenerative changes of the left hip.  Left adnexal cystic structure measuring 2.9 x 2.6 cm. Recommend  pelvic ultrasound in 6-12 weeks.    Electronically signed by:  Taye Poon MD  11/2/2021 5:30 PM CDT  Workstation: [id number]    XR Chest 1 View [[id number]] Collected: 11/02/21 1426     Updated: 11/02/21 1501    Narrative:      EXAM DESCRIPTION:     XR CHEST 1 VW    CLINICAL HISTORY:     81 years  Female  pre op, S72.002A Fracture of unspecified part  of neck of left femur, initial encounter for closed fracture    COMPARISON:      None available    TECHNIQUE:     One view-AP radiograph the chest    FINDINGS:     The lungs are well-expanded and clear. The cardiac silhouette and  pulmonary vasculature are within normal limits. There are no  pleural effusions.      Impression:          1. No radiographic evidence of acute cardiopulmonary disease.        Electronically signed by:  Lexus Hernandez MD  11/2/2021 3:00 PM  CDT Workstation: [id number]    XR Hip With or Without Pelvis 2 - 3 View Left [[id number]] Collected: 11/02/21 1323     Updated: 11/02/21 1422    Narrative:      AP pelvis single view and left hip two views three views total  November 2, 2021    INDICATION: Patient fell today with acute onset pain    FINDINGS:  Limited study secondary to limitation in patient positioning.  Comminuted intertrochanteric fracture may extend into the femoral  neck. Multiple butterfly fragments suspected. No definite  anterior pelvic fracture.  Right hip grossly normal.  Degenerative changes lower lumbar region.      Impression:      Limited study.  Comminuted intertrochanteric fracture on the left may extend into  the femoral neck. Clinical correlation recommended. Depending  upon the clinical situation, he scan might be considered for more  complete evaluation.    Electronically signed by:  Kg Villa MD  11/2/2021 2:21 PM  CDT Workstation: [id number]            Chief Complaint on Day of Discharge: none    Hospital Course:  81 year old female with past medical history of HTN, HLD who presented on 11/2/2021 after suffering a fall that resulted in left hip fracture.  Fracture was repaired on 11/3 by Dr. De Luna.  She was provided with Xarelto for DVT prevention while hospitalized but transitioned to twice daily baby aspirin therapy for discharge per Dr. De Luna's instruction.  Patient remains deconditioned from her baseline and SNF was recommended however patient has opted for Pt/OT via home health with 24/7 care/assistance from her family.  She will  "discharge in stable condition with instructions for one week PCP and ortho follow up.  DME orders for rolling walker and bedside order were placed.     Condition on Discharge:  Stable    Physical Exam on Discharge:  /60 (BP Location: Right arm, Patient Position: Lying)   Pulse 85   Temp 96.7 °F (35.9 °C) (Oral)   Resp 18   Ht 162.6 cm (64\")   Wt 69.6 kg (153 lb 6.4 oz)   SpO2 95%   BMI 26.33 kg/m²   Physical Exam  Vitals and nursing note reviewed.   Constitutional:       General: She is not in acute distress.     Appearance: Normal appearance. She is not ill-appearing.   HENT:      Head: Normocephalic and atraumatic.      Right Ear: External ear normal.      Left Ear: External ear normal.      Nose: Nose normal.      Mouth/Throat:      Mouth: Mucous membranes are moist.      Pharynx: Oropharynx is clear.   Eyes:      General: No scleral icterus.        Right eye: No discharge.         Left eye: No discharge.      Conjunctiva/sclera: Conjunctivae normal.   Cardiovascular:      Rate and Rhythm: Normal rate and regular rhythm.      Pulses: Normal pulses.      Heart sounds: Normal heart sounds. No murmur heard.  No friction rub. No gallop.    Pulmonary:      Effort: Pulmonary effort is normal. No respiratory distress.      Breath sounds: Normal breath sounds. No stridor. No wheezing, rhonchi or rales.   Abdominal:      General: Bowel sounds are normal. There is no distension.      Palpations: Abdomen is soft.      Tenderness: There is no abdominal tenderness.   Musculoskeletal:         General: No swelling. Normal range of motion.      Cervical back: Normal range of motion and neck supple.   Skin:     General: Skin is warm and dry.      Comments: Left hip dressing clean, dry, intact  Neurological:      General: No focal deficit present.      Mental Status: She is alert and oriented to person, place, and time.   Psychiatric:         Mood and Affect: Mood normal.         Behavior: Behavior normal. "     [place] [facility] [place]charge Medications:     Discharge Medications      New Medications      Instructions Start Date   acetaminophen 325 MG tablet  Commonly known as: TYLENOL   650 mg, Oral, Every 4 Hours PRN      docusate sodium 100 MG capsule   100 mg, Oral, 2 Times Daily PRN      HYDROcodone-acetaminophen 5-325 MG per tablet  Commonly known as: NORCO   1 tablet, Oral, Every 4 Hours PRN      rivaroxaban 10 MG tablet  Commonly known as: XARELTO   10 mg, Oral, Daily         Continue These Medications      Instructions Start Date   carvedilol 3.125 MG tablet  Commonly known as: COREG   3.125 mg, Oral, 2 Times Daily With Meals      furosemide 20 MG tablet  Commonly known as: LASIX   1 tablet, Oral, Every Morning      potassium chloride 10 MEQ CR capsule  Commonly known as: MICRO-K   20 mEq, Oral, Daily      simvastatin 20 MG tablet  Commonly known as: ZOCOR   20 mg, Oral, Nightly             Discharge Diet:   Diet Instructions     Diet: Regular; Thin      Discharge Diet: Regular    Fluid Consistency: Thin          Activity at Discharge:   Activity Instructions     Activity as Tolerated            Discharge Care Plan/Instructions: Follow up with PCP and ortho in one week.     Follow-up Appointments:   Additional Instructions for the Follow-ups that You Need to Schedule     Ambulatory Referral to Home Health   As directed      Face to Face Visit Date: 11/5/2021    Follow-up provider for Plan of Care?: I treated the patient in an acute care facility and will not continue treatment after discharge.    Follow-up provider: NO KNOWN PROVIDER [[id number]]    Reason/Clinical Findings: deconditioning r/t left hip fx    Describe mobility limitations that make leaving home difficult: deconditioning r/t left hip fx    Nursing/Therapeutic Services Requested: Physical Therapy Occupational Therapy    PT orders: Strengthening    Occupational orders: Strengthening    Frequency: 1 Week 1         Discharge  Follow-up with PCP   As directed       Currently Documented PCP:    Provider, No Known    PCP Phone Number:    None     Follow Up Details: one week         Discharge Follow-up with Specified Provider: Annamarie; 1 Week   As directed      To: Annamarie    Follow Up: 1 Week            Contact information for follow-up providers     Provider, No Known .    Why: one week  Contact information:  [facility] [address]                   Contact information for after-discharge care     Durable Medical Equipment     [facility] .    Service: Durable Medical Equipment  Contact information:  [address] [facility] [address] [phone]                 Home Medical Care     [facility] .    Service: Home Health Services  Contact information:  [address]   [place] [address] [phone]                             Test Results Pending at Discharge:           This document has been electronically signed by EUGENE Juarez on November 7, 2021 09:31 CST        Time: 30 minutes spent on assessment, discussion, management, and discharge planning for this patient.

## 2021-11-07 NOTE — THERAPY TREATMENT NOTE
Patient Name: Corina Lenz  : 1940    MRN: [id number]                              Today's Date: 2021       Admit Date: 2021    Visit Dx:     ICD-10-CM ICD-9-CM   1. Displaced fracture of left femoral neck (HCC)  S72.002A 820.8   2. Closed displaced basicervical fracture of left femur, initial encounter ([facility])  S72.042A 820.03   3. Impaired mobility and ADLs  Z74.09 V49.89    Z78.9    4. Impaired functional mobility, balance, gait, and endurance  Z74.09 V49.89   5. Closed displaced basicervical fracture of left femur with routine healing, subsequent encounter  S72.042D V54.13     Patient Active Problem List   Diagnosis   • Displaced fracture of left femoral neck (HCC)   • Primary hypertension   • Closed displaced fracture of base of neck of left femur (HCC)     Past Medical History:   Diagnosis Date   • CHF (congestive heart failure) (HCC)    • HLD (hyperlipidemia)    • Hypertension      Past Surgical History:   Procedure Laterality Date   • HIP TROCHANTERIC NAILING WITH INTRAMEDULLARY HIP SCREW Left 11/3/2021    Procedure: HIP TROCHANTERIC NAILING SHORT WITH INTRAMEDULLARY HIP SCREW;  Surgeon: Leobardo De Luna MD;  Location: [facility];  Service: Orthopedics;  Laterality: Left;      General Information     Row Name 21 1123          OT Time and Intention    Document Type therapy note (daily note)  -TO     Mode of Treatment individual therapy; occupational therapy  -TO     Row Name 21 1123          General Information    Patient Profile Reviewed yes  -TO     Existing Precautions/Restrictions fall  -TO     Row Name 21 1123          Cognition    Orientation Status (Cognition) oriented x 4  -TO     Row Name 21 1123          Safety Issues, Functional Mobility    Impairments Affecting Function (Mobility) strength; endurance/activity tolerance; balance; range of motion (ROM); pain  -TO           User Key  (r) = Recorded By, (t) = Taken By, (c) = Cosigned By    Initials Name Provider  Type    TO Yovany Israel COTA Occupational Therapy Assistant                 Mobility/ADL's     Row Name 11/07/21 1123          Mobility    Extremity Weight-bearing Status left lower extremity  -TO     Left Lower Extremity (Weight-bearing Status) weight-bearing as tolerated (WBAT)  -TO     Row Name 11/07/21 1123          Upper Body Dressing Assessment/Training    [place] Level (Upper Body Dressing) --  HG  -TO           User Key  (r) = Recorded By, (t) = Taken By, (c) = Cosigned By    Initials Name Provider Type    TO Yovany Israel COTA Occupational Therapy Assistant               Obj/Interventions     Row Name 11/07/21 1126          Vision Assessment/Intervention    Visual Impairment/Limitations corrective lenses full-time  -TO     Row Name 11/07/21 1126          Range of Motion Comprehensive    General Range of Motion bilateral lower extremity ROM WFL  -TO     Row Name 11/07/21 1126          Strength Comprehensive (MMT)    General Manual Muscle Testing (MMT) Assessment other (see comments)  -TO     Row Name 11/07/21 1126          Shoulder (Therapeutic Exercise)    Shoulder (Therapeutic Exercise) strengthening exercise  -TO     Shoulder Strengthening (Therapeutic Exercise) bilateral; 1 lb free weight; flexion; extension; aBduction; aDduction; external rotation; internal rotation; scapular stabilization; sitting  oh press 1 x 15  -TO     Row Name 11/07/21 1126          Elbow/Forearm (Therapeutic Exercise)    Elbow/Forearm (Therapeutic Exercise) strengthening exercise  1 x 15  -TO     Elbow/Forearm Strengthening (Therapeutic Exercise) bilateral; flexion; extension  1 x 15  -TO     Row Name 11/07/21 1126          Wrist (Therapeutic Exercise)    Wrist (Therapeutic Exercise) strengthening exercise  -TO     Wrist Strengthening (Therapeutic Exercise) bilateral; flexion; extension  1 x 15  -TO     Row Name 11/07/21 1126          Therapeutic Exercise    Therapeutic Exercise shoulder; elbow/forearm  -TO            User Key  (r) = Recorded By, (t) = Taken By, (c) = Cosigned By    Initials Name Provider Type    TO Yovany Israel COTA Occupational Therapy Assistant               Goals/Plan     Row Name 11/07/21 1126          Transfer Goal 1 (OT)    Activity/Assistive Device (Transfer Goal 1, OT) toilet  -TO     [place] Level/Cues Needed (Transfer Goal 1, OT) minimum assist (75% or more patient effort)  -TO     Time Frame (Transfer Goal 1, OT) long term goal (LTG); by discharge  -TO     Progress/Outcome (Transfer Goal 1, OT) goal not met  -TO     Row Name 11/07/21 1126          Bathing Goal 1 (OT)    Activity/Device (Bathing Goal 1, OT) lower body bathing  -TO     [place] Level/Cues Needed (Bathing Goal 1, OT) standby assist  -TO     Time Frame (Bathing Goal 1, OT) long term goal (LTG); by discharge  -TO     Progress/Outcomes (Bathing Goal 1, OT) goal not met  -TO     Row Name 11/07/21 1126          Dressing Goal 1 (OT)    Activity/Device (Dressing Goal 1, OT) lower body dressing  -TO     [place]/Cues Needed (Dressing Goal 1, OT) standby assist; minimum assist (75% or more patient effort)  -TO     Time Frame (Dressing Goal 1, OT) long term goal (LTG); by discharge  -TO     Progress/Outcome (Dressing Goal 1, OT) goal not met  -TO     Row Name 11/07/21 1126          Toileting Goal 1 (OT)    [place] Level/Cues Needed (Toileting Goal 1, OT) minimum assist (75% or more patient effort)  -TO     Time Frame (Toileting Goal 1, OT) long term goal (LTG); by discharge  -TO     Progress/Outcome (Toileting Goal 1, OT) goal not met  -TO           User Key  (r) = Recorded By, (t) = Taken By, (c) = Cosigned By    Initials Name Provider Type    TO Yovany Israel COTA Occupational Therapy Assistant               Clinical Impression     Row Name 11/07/21 1126          Pain Scale: Numbers Pre/Post-Treatment    Pretreatment Pain Rating 0/10 - no pain  -TO     Posttreatment Pain Rating 0/10 - no pain  -TO     Row Name 11/07/21  1126          Pain Scale: FACES Pre/Post-Treatment    Pain: FACES Scale, Pretreatment 0-->no hurt  -TO     Posttreatment Pain Rating 0-->no hurt  -TO     Row Name 11/07/21 1126          Plan of Care Review    Progress improving  -TO     Outcome Summary Pt declined OOB 2/2 just getting back in bed and not wantng back up until dc. Pt edu on B UE hep 1 x 16 with god understanding and edu on HS/DME to increase safety and I at home  -TO     Row Name 11/07/21 1126          Therapy Assessment/Plan (OT)    Rehab Potential (OT) good, to achieve stated therapy goals  -TO     Criteria for Skilled Therapeutic Interventions Met (OT) yes; meets criteria; skilled treatment is necessary  -TO     Therapy Frequency (OT) daily  -TO     Row Name 11/07/21 1126          Therapy Plan Review/Discharge Plan (OT)    Anticipated Discharge Disposition (OT) inpatient rehabilitation facility; skilled nursing facility  -TO           User Key  (r) = Recorded By, (t) = Taken By, (c) = Cosigned By    Initials Name Provider Type    TO Yovany Israel COTA Occupational Therapy Assistant               Outcome Measures     Row Name 11/07/21 1126          How much help from another is currently needed...    Putting on and taking off regular lower body clothing? 2  -TO     Bathing (including washing, rinsing, and drying) 3  -TO     Toileting (which includes using toilet bed pan or urinal) 2  -TO     Putting on and taking off regular upper body clothing 4  -TO     Taking care of personal grooming (such as brushing teeth) 4  -TO     Eating meals 4  -TO     AM-PAC 6 Clicks Score (OT) 19  -TO           User Key  (r) = Recorded By, (t) = Taken By, (c) = Cosigned By    Initials Name Provider Type    TO Yovany Israel COTA Occupational Therapy Assistant                Occupational Therapy Education                 Title: PT OT SLP Therapies (Resolved)     Topic: Occupational Therapy (Resolved)     Point: ADL training (Resolved)     Description:   Instruct  learner(s) on proper safety adaptation and remediation techniques during self care or transfers.   Instruct in proper use of assistive devices.              Learner Progress:  Not documented in this visit.          Point: Home exercise program (Resolved)     Description:   Instruct learner(s) on appropriate technique for monitoring, assisting and/or progressing therapeutic exercises/activities.              Learner Progress:  Not documented in this visit.          Point: Precautions (Resolved)     Description:   Instruct learner(s) on prescribed precautions during self-care and functional transfers.              Learning Progress Summary           Patient Acceptance, E, VU by ME at 11/4/2021 0935    Comment: Educated on OT And POC. Educated to call for assistance. Educated on safety precautions.    Acceptance, E,TB, NR by  at 11/3/2021 1621    Comment: POC, role of OT, transfer training                   Point: Body mechanics (Resolved)     Description:   Instruct learner(s) on proper positioning and spine alignment during self-care, functional mobility activities and/or exercises.              Learning Progress Summary           Patient Acceptance, E, VU by ME at 11/4/2021 0935    Comment: Educated on OT And POC. Educated to call for assistance. Educated on safety precautions.    Acceptance, E,TB, NR by  at 11/3/2021 1621    Comment: POC, role of OT, transfer training                               User Key     Initials Effective Dates Name Provider Type Discipline    ME 06/16/21 -  Yolanda Avina OTR/L Occupational Therapist OT     06/14/21 -  Fazal Cheatham OT Occupational Therapist OT              OT Recommendation and Plan  Therapy Frequency (OT): daily  Plan of Care Review  Plan of Care Reviewed With: patient  Progress: improving  Outcome Summary: Pt declined OOB 2/2 just getting back in bed and not wantng back up until dc. Pt edu on B UE hep 1 x 16 with god understanding and edu on HS/DME to increase  safety and I at home     Time Calculation:    Time Calculation- OT     Row Name 11/07/21 1257             Time Calculation- OT    OT Start Time 1123  -TO      OT Stop Time 1146  -TO      OT Time Calculation (min) 23 min  -TO      Total Timed Code Minutes- OT 23 minute(s)  -TO      OT Received On 11/07/21  -TO              Timed Charges    50905 - OT Therapeutic Exercise Minutes 23  -TO              Total Minutes    Timed Charges Total Minutes 23  -TO       Total Minutes 23  -TO            User Key  (r) = Recorded By, (t) = Taken By, (c) = Cosigned By    Initials Name Provider Type    TO Yovany Israel COTA Occupational Therapy Assistant              Therapy Charges for Today     Code Description Service Date Service Provider Modifiers Qty    15553849293 HC OT SELF CARE/MGMT/TRAIN EA 15 MIN 11/6/2021 Yovany Israel COTA GO 4    81379861141 HC OT THER PROC EA 15 MIN 11/7/2021 Yovany Israel COTA GO 2               KING Whipple  11/7/2021

## 2021-11-07 NOTE — DISCHARGE PLACEMENT REQUEST
"Ministerio Clarke (81 y.o. Female)             Date of Birth Social Security Number Address Home Phone MRN    1940  [address] [id number] [phone] [id number]    [employer] Marital Status             None        Admission Date Admission Type Admitting Provider Attending Provider Department, Room/Bed    21 Emergency Camacho Cruz MD Popescu, Tudor, MD [address], [facility]/1    Discharge Date Discharge Disposition Discharge Destination           [place]Health Care [facility] [place]              Attending Provider: Rod Tucker MD    Allergies: No Known Allergies    Isolation: None   Infection: None   Code Status: CPR   Advance Care Planning Activity    Ht: 162.6 cm (64\")   Wt: 69.6 kg (153 lb 6.4 oz)    Admission Cmt: None   Principal Problem: Closed displaced fracture of base of neck of left femur (HCC) [S72.042A] More...                 Active Insurance as of 2021     Primary Coverage     Payor Plan Insurance Group Employer/Plan Group    ANTHEM MEDICARE REPLACEMENT ANTHEM MEDICARE ADVANTAGE [id number]     Payor Plan Address Payor Plan Phone Number Payor Plan Fax Number Effective Dates    [address] [phone]  2021 - None Entered    [facility] [address]       Subscriber Name Subscriber Birth Date Member ID       MINISTERIO CLAKRE 1940 [id number]                 Emergency Contacts      (Rel.) Home Phone Work Phone Mobile Phone    Donya Worley (Relative) [phone] -- [phone]          [address] [address]  Dept. Phone:  [phone]  Dept. Fax:   Date Ordered: 2021         Patient:  Ministerio Clarke MRN:  [id number]   [address] [facility] [address] :  1940  SSN:    Phone: [phone] Sex:  F     Weight: 69.6 kg (153 lb 6.4 oz)         Ht Readings from Last 1 Encounters:   21 162.6 cm (64\")         Miscellaneous " DME   (Order ID: [id number])    Diagnosis:  Displaced fracture of left femoral neck (HCC) (S72.002A [ICD-10-CM] 820.8 [ICD-9-CM])   Quantity:  1     Type of DME: bedside commode  Length of Need (99 Months = Lifetime): 99 Months = Lifetime        Authorizing Provider's Phone: [phone]     Authorizing Provider:Dina Barrios APRN  Authorizing Provider's NPI: [id number]  Order Entered By: Dina Barrios APRN 11/6/2021  1:18 PM     Electronically signed by: Dina Barrios APRN 11/6/2021  1:18 PM

## 2021-11-07 NOTE — THERAPY TREATMENT NOTE
Acute Care - Physical Therapy Treatment Note  [facility]     Patient Name: Corina Lenz  : 1940  MRN: [id number]  Today's Date: 2021      Visit Dx:     ICD-10-CM ICD-9-CM   1. Displaced fracture of left femoral neck (HCC)  S72.002A 820.8   2. Closed displaced basicervical fracture of left femur, initial encounter ([facility])  S72.042A 820.03   3. Impaired mobility and ADLs  Z74.09 V49.89    Z78.9    4. Impaired functional mobility, balance, gait, and endurance  Z74.09 V49.89   5. Closed displaced basicervical fracture of left femur with routine healing, subsequent encounter  S72.042D V54.13     Patient Active Problem List   Diagnosis   • Displaced fracture of left femoral neck (HCC)   • Primary hypertension   • Closed displaced fracture of base of neck of left femur (HCC)     Past Medical History:   Diagnosis Date   • CHF (congestive heart failure) (HCC)    • HLD (hyperlipidemia)    • Hypertension      Past Surgical History:   Procedure Laterality Date   • HIP TROCHANTERIC NAILING WITH INTRAMEDULLARY HIP SCREW Left 11/3/2021    Procedure: HIP TROCHANTERIC NAILING SHORT WITH INTRAMEDULLARY HIP SCREW;  Surgeon: Leobardo De Luna MD;  Location: [facility];  Service: Orthopedics;  Laterality: Left;     PT Assessment (last 12 hours)     PT Evaluation and Treatment     Row Name 21 1014          Physical Therapy Time and Intention    Subjective Information no complaints  -TA     Document Type therapy note (daily note)  -TA     Mode of Treatment individual therapy; physical therapy  -TA     Row Name 21 1014          General Information    Patient Profile Reviewed yes  -TA     Existing Precautions/Restrictions fall  -TA     Row Name 21 1014          Cognition    Orientation Status (Cognition) oriented x 4  -TA     Personal Safety Interventions fall prevention program maintained; gait belt; nonskid shoes/slippers when out of bed; supervised activity  -TA     Row Name 21 1014          Pain  Scale: Numbers Pre/Post-Treatment    Pretreatment Pain Rating 0/10 - no pain  -TA     Posttreatment Pain Rating 0/10 - no pain  -TA     Row Name 11/07/21 1014          Range of Motion Comprehensive    General Range of Motion bilateral lower extremity ROM WFL  -[facility] [place] Name 11/07/21 1014          Strength Comprehensive (MMT)    General Manual Muscle Testing (MMT) Assessment other (see comments)  -     Row Name 11/07/21 1014          Mobility    Extremity Weight-bearing Status left lower extremity  -TA     Left Lower Extremity (Weight-bearing Status) weight-bearing as tolerated (WBAT)  -     Row Name 11/07/21 1014          Bed Mobility    Bed Mobility supine-sit; scooting/bridging; rolling right; rolling left  -TA     Rolling Left [place] (Bed Mobility) 1 person assist; contact guard  -TA     Rolling Right [place] (Bed Mobility) contact guard; 1 person assist  -TA     Supine-Sit [place] (Bed Mobility) minimum assist (75% patient effort); 1 person assist  -TA     Sit-Supine [place] (Bed Mobility) minimum assist (75% patient effort); 1 person assist  -TA     Assistive Device (Bed Mobility) bed rails; head of bed elevated; draw sheet  -[facility] [place] Name 11/07/21 1014          Transfers    Transfers sit-stand transfer; stand-sit transfer; chair-bed transfer  -TA     Sit-Stand [place] (Transfers) minimum assist (75% patient effort)  -TA     Stand-Sit [place] (Transfers) minimum assist (75% patient effort)  -TA     [place] Level (Toilet Transfer) minimum assist (75% patient effort); 1 person to manage equipment  -     Row Name 11/07/21 1014          Sit-Stand Transfer    Assistive Device (Sit-Stand Transfers) walker, front-wheeled  -TA     Row Name 11/07/21 1014          Stand-Sit Transfer    Assistive Device (Stand-Sit Transfers) walker, front-wheeled  -TA     Row Name 11/07/21 1014          Toilet Transfer    Type (Toilet Transfer) sit-stand; stand-sit  -[facility] [place] Name 11/07/21  1014          Gait/Stairs (Locomotion)    [place] Level (Gait) minimum assist (75% patient effort); maximum assist (25% patient effort)  -TA     Assistive Device (Gait) walker, front-wheeled  -TA     Distance in Feet (Gait) 8` x 2  -TA     Row Name 11/07/21 1014          Safety Issues, Functional Mobility    Impairments Affecting Function (Mobility) strength; endurance/activity tolerance; balance; range of motion (ROM); pain  -TA     Row Name             Wound 11/03/21 Left lateral hip Incision    Wound - Properties Group Placement Date: 11/03/21  -KB Present on Hospital Admission: N  -KB Side: Left  -KB Orientation: lateral  -KB Location: hip  -KB, x2  Primary Wound Type: Incision  -KB     Retired Wound - Properties Group Date first assessed: 11/03/21  -KB Present on Hospital Admission: N  -KB Side: Left  -KB Location: hip  -KB, x2  Primary Wound Type: Incision  -KB     Row Name 11/07/21 1014          Plan of Care Review    Plan of Care Reviewed With patient  -TA     Outcome Summary pt sup<>sit & sit<>stand with min assist, pt ambulated bed<>BR 8` x 2 with RW & min assist of 1, pt will require 24/7 care & contined PT services @ D/C  -TA     Row Name 11/07/21 1014          Vital Signs    Pre Systolic BP Rehab 158  -TA     Pre Treatment Diastolic BP 82  -TA     Post Systolic BP Rehab 139  -TA     Post Treatment Diastolic BP 77  -TA     Pretreatment Heart Rate (beats/min) 80  -TA     Posttreatment Heart Rate (beats/min) 82  -TA     Pre SpO2 (%) 96  -TA     O2 Delivery Pre Treatment room air  -TA     Post SpO2 (%) 95  -TA     O2 Delivery Post Treatment room air  -TA     Pre Patient Position Supine  -TA     Post Patient Position Supine  -TA     Row Name 11/07/21 1014          Bed Mobility Goal 1 (PT)    Activity/Assistive Device (Bed Mobility Goal 1, PT) sit to supine/supine to sit  -TA     [place] Level/Cues Needed (Bed Mobility Goal 1, PT) independent  -TA     Time Frame (Bed Mobility Goal 1, PT) by  discharge  -TA     Strategies/Barriers (Bed Mobility Goal 1, PT) L hip ORIF, WBAT.  -TA     Progress/Outcomes (Bed Mobility Goal 1, PT) goal not met  -TA     Row Name 11/07/21 1014          Transfer Goal 1 (PT)    Activity/Assistive Device (Transfer Goal 1, PT) sit-to-stand/stand-to-sit; bed-to-chair/chair-to-bed  -TA     [place] Level/Cues Needed (Transfer Goal 1, PT) modified independence  -TA     Time Frame (Transfer Goal 1, PT) by discharge  -TA     Strategies/Barriers (Transfers Goal 1, PT) L hip ORIF, WBAT.  -TA     Progress/Outcome (Transfer Goal 1, PT) goal not met  -TA     Row Name 11/07/21 1014          Gait Training Goal 1 (PT)    Activity/Assistive Device (Gait Training Goal 1, PT) walker, rolling  -TA     [place] Level (Gait Training Goal 1, PT) modified independence  -TA     Distance (Gait Training Goal 1, PT) 100' x 1.  -TA     Time Frame (Gait Training Goal 1, PT) by discharge  -TA     Strategies/Barriers (Gait Training Goal 1, PT) L hip ORIF, WBAT. Orthostatic hypotension during evaluation.  -TA     Progress/Outcome (Gait Training Goal 1, PT) goal not met  -TA     Row Name 11/07/21 1014          Positioning and Restraints    Pre-Treatment Position in bed  -TA     Post Treatment Position bed  -TA     In Bed supine; call light within reach; exit alarm on  -TA     Row Name 11/07/21 1014          Therapy Assessment/Plan (PT)    Rehab Potential (PT) good, to achieve stated therapy goals  -TA     Criteria for Skilled Interventions Met (PT) yes; skilled treatment is necessary  -TA     Comment, Therapy Assessment/Plan (PT) continue  -TA           User Key  (r) = Recorded By, (t) = Taken By, (c) = Cosigned By    Initials Name Provider Type    TA Lyn Arthur PTA Physical Therapy Assistant    Lexus Dempsey RN Registered Nurse              Physical Therapy Education                 Title: PT OT SLP Therapies (Resolved)     Topic: Physical Therapy (Resolved)     Point: Mobility training  (Resolved)     Learning Progress Summary           Patient Acceptance, E, VU,NR by YUE at 11/4/2021 1001    Comment: PT POC, rehab process, use of walker.                   Point: Home exercise program (Resolved)     Learner Progress:  Not documented in this visit.          Point: Body mechanics (Resolved)     Learner Progress:  Not documented in this visit.          Point: Precautions (Resolved)     Learner Progress:  Not documented in this visit.                      User Key     Initials Effective Dates Name Provider Type Discipline     06/16/21 -  Gabriel Salguero, PT Physical Therapist PT              PT Recommendation and Plan  Anticipated Discharge Disposition (PT): home with home health, home with 24/7 care, home with assist, skilled nursing facility  Therapy Frequency (PT): daily  Plan of Care Reviewed With: patient  Progress: improving  Outcome Summary: pt sup<>sit & sit<>stand with min assist, pt ambulated bed<>BR 8` x 2 with RW & min assist of 1, pt will require 24/7 care & contined PT services @ D/C   Outcome Measures     Row Name 11/07/21 1300 11/06/21 1600 11/05/21 1200       How much help from another person do you currently need...    Turning from your back to your side while in flat bed without using bedrails? 3  -TA 3  -TA 3  -TA    Moving from lying on back to sitting on the side of a flat bed without bedrails? 3  -TA 3  -TA 3  -TA    Moving to and from a bed to a chair (including a wheelchair)? 3  -TA 3  -TA 3  -TA    Standing up from a chair using your arms (e.g., wheelchair, bedside chair)? 3  -TA 3  -TA 3  -TA    Climbing 3-5 steps with a railing? 2  -TA 2  -TA 2  -TA    To walk in hospital room? 3  -TA 3  -TA 3  -TA    AM-PAC 6 Clicks Score (PT) 17  -TA 17  -TA 17  -TA       Functional Assessment    Outcome Measure Options AM-PAC 6 Clicks Basic Mobility (PT)  -TA AM-PAC 6 Clicks Basic Mobility (PT)  -TA AM-PAC 6 Clicks Basic Mobility (PT)  -TA          User Key  (r) = Recorded By, (t) =  Taken By, (c) = Cosigned By    Initials Name Provider Type    Lyn Garvin PTA Physical Therapy Assistant                 Time Calculation:    PT Charges     Row Name 11/07/21 1303             Time Calculation    Start Time 1014  -TA      Stop Time 1102  -TA      Time Calculation (min) 48 min  -TA      PT Received On 11/07/21  -TA              Time Calculation- PT    Total Timed Code Minutes- PT 48 minute(s)  -TA              Timed Charges    22106 - Gait Training Minutes  15  -TA      41094 - PT Therapeutic Activity Minutes 33  -TA              Total Minutes    Timed Charges Total Minutes 48  -TA       Total Minutes 48  -TA            User Key  (r) = Recorded By, (t) = Taken By, (c) = Cosigned By    Initials Name Provider Type    Lyn Garvin PTA Physical Therapy Assistant              Therapy Charges for Today     Code Description Service Date Service Provider Modifiers Qty    74482045064 HC PT THER PROC EA 15 MIN 11/6/2021 Lyn Arthur, PTA GP 1    17774790079 HC GAIT TRAINING EA 15 MIN 11/6/2021 Lyn Arthur, PTA GP 1    72373613537 HC PT THERAPEUTIC ACT EA 15 MIN 11/6/2021 Lyn Arthur, PTA GP 1    80591368073 HC GAIT TRAINING EA 15 MIN 11/7/2021 Lyn Arthur, PTA GP 1    50024382069 HC PT THERAPEUTIC ACT EA 15 MIN 11/7/2021 Lyn Arthur, PTA GP 2          PT G-Codes  Outcome Measure Options: AM-PAC 6 Clicks Basic Mobility (PT)  AM-PAC 6 Clicks Score (PT): 17  AM-PAC 6 Clicks Score (OT): 19    Lyn Arthur PTA  11/7/2021

## 2021-11-07 NOTE — PLAN OF CARE
Goal Outcome Evaluation:  Plan of Care Reviewed With: patient        Progress: improving  Outcome Summary: pt sup<>sit & sit<>stand with min assist, pt ambulated bed<>BR 8` x 2 with RW & min assist of 1, pt will require 24/7 care & contined PT services @ D/C

## 2021-11-08 ENCOUNTER — HOME CARE VISIT (OUTPATIENT)
Dept: HOME HEALTH SERVICES | Facility: CLINIC | Age: 81
End: 2021-11-08

## 2021-11-08 ENCOUNTER — READMISSION MANAGEMENT (OUTPATIENT)
Dept: CALL CENTER | Facility: HOSPITAL | Age: 81
End: 2021-11-08

## 2021-11-08 VITALS
SYSTOLIC BLOOD PRESSURE: 128 MMHG | OXYGEN SATURATION: 98 % | TEMPERATURE: 97.7 F | HEART RATE: 82 BPM | DIASTOLIC BLOOD PRESSURE: 70 MMHG | RESPIRATION RATE: 18 BRPM

## 2021-11-08 PROCEDURE — G0151 HHCP-SERV OF PT,EA 15 MIN: HCPCS

## 2021-11-08 PROCEDURE — G0152 HHCP-SERV OF OT,EA 15 MIN: HCPCS

## 2021-11-08 NOTE — OUTREACH NOTE
Prep Survey      Responses   [facility] facility patient discharged from? [facility]   Is LACE score < 7 ? No   Emergency Room discharge w/ pulse ox? No   Eligibility Readm Mgmt   Discharge diagnosis Closed displaced fracture of base of neck of left femur    Does the patient have one of the following disease processes/diagnoses(primary or secondary)? General Surgery   Does the patient have Home health ordered? Yes   What is the Home health agency?  [facility]   Is there a DME ordered? Yes   What DME was ordered? [employer]   Comments regarding appointments Appts needed   Medication alerts for this patient ASA   Prep survey completed? Yes          Sheron Martínez RN

## 2021-11-08 NOTE — PAYOR COMM NOTE
"Cherie Cantu  Case Management Extender  [phone] phone  [phone] fax    Ref# [id number]    Ministerio Clarke (81 y.o. Female)             Date of Birth Social Security Number Address Home Phone MRN    1940  [address] [facility] [address] [phone] [id number]    Jainism Marital Status             None        Admission Date Admission Type Admitting Provider Attending Provider Department, Room/Bed    11/2/21 Emergency Camacho Cruz MD  [address], 430/1    Discharge Date Discharge Disposition Discharge Destination          11/7/2021 Home-Health Care Svc              Attending Provider: (none)   Allergies: No Known Allergies    Isolation: None   Infection: None   Code Status: Prior   Advance Care Planning Activity    Ht: 162.6 cm (64\")   Wt: 69.6 kg (153 lb 6.4 oz)    Admission Cmt: None   Principal Problem: Closed displaced fracture of base of neck of left femur (HCC) [S72.042A] More...                 Active Insurance as of 11/2/2021     Primary Coverage     Payor Plan Insurance Group Employer/Plan Group    ANTHEM MEDICARE REPLACEMENT ANTHEM MEDICARE ADVANTAGE [id number]     Payor Plan Address Payor Plan Phone Number Payor Plan Fax Number Effective Dates    [address] [phone]  1/1/2021 - None Entered    [facility] [address]       Subscriber Name Subscriber Birth Date Member ID       MINISTERIO CLARKE 1940 [id number]                 Emergency Contacts      (Rel.) Home Phone Work Phone Mobile Phone    Donya Worley (Relative) [phone] -- [phone]               Discharge Summary      Dina Barrios APRN at 11/07/21 0931     Attestation signed by Barrie Zavala MD at 11/07/21 1255    I personally evaluated and examined the patient in conjunction with EUGENE Juarez and agree with the assessment, treatment plan, and disposition of the patient as recorded.                          [facility] " [facility] Medicine Services  DISCHARGE SUMMARY       Date of Admission: 11/2/2021  Date of Discharge:  11/7/2021  Primary Care Physician: Provider, No Known    Presenting Problem/History of Present Illness:  Displaced fracture of left femoral neck (HCC) [S72.002A]       Final Discharge Diagnoses:  Active Hospital Problems    Diagnosis    • **Closed displaced fracture of base of neck of left femur (HCC)      Added automatically from request for surgery [id number]     • Displaced fracture of left femoral neck (HCC)    • Primary hypertension        Consults:   Consults     Date and Time Order Name Status Description    11/2/2021  4:46 PM Inpatient Orthopedic Surgery Consult            Procedures Performed: Procedure(s):  HIP TROCHANTERIC NAILING SHORT WITH INTRAMEDULLARY HIP SCREW                Pertinent Test Results:   Lab Results (last 24 hours)     ** No results found for the last 24 hours. **        Imaging Results (All)     Procedure Component Value Units Date/Time    XR Hip With or Without Pelvis 2 - 3 View Left [[id number]] Collected: 11/03/21 1432     Updated: 11/03/21 1516    Narrative:      Procedure:  Left hip        Indication:  Postop fracture internal fixation..    Technique:  2 views   .    Prior relevant exam: Left hip November 2, 2021..    Left hip fracture without angulation or deformity. Alignment is  significantly improved in comparison with prereduction views.  Alignment is maintained by a lag screw through  the femoral neck  attached to a short intramedullary maryanne through the proximal  femoral shaft.     Skin staples are noted in the lateral aspect left hip and  proximal femur at the incision sites.      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      Impression:      As above.    Electronically signed by:  Timo Looney MD  11/3/2021 3:14 PM CDT  Workstation: [id number]    FL C Arm During Surgery [[id number]] Resulted: 11/03/21 4250     Updated:  11/03/21 1459    CT Lower Extremity Left Without Contrast [[id number]] Collected: 11/02/21 1553     Updated: 11/02/21 1731    Narrative:      PROCEDURE: CT LOWER EXTREMITY WITHOUT IV CONTRAST    TECHNIQUE: Multichannel computed tomography of the left hip  without contrast.  Coronal and sagittal reformatted images were also obtained to  improve fracture detection.    Contrast: None    This exam was performed using radiation doses that are as low as  reasonably achievable (ALARA).  This exam was performed according to our departmental dose  optimization program, which includes automated exposure control,  adjustment of the mA and/or KV according to patient size and/or  use of iterative reconstruction technique.    COMPARISON: Left hip radiographs performed the same date.    HISTORY: further eval of left hip fracture per ortho, S72.002A  Fracture of unspecified part of neck of left femur, initial  encounter for closed fracture    FINDINGS  There is a comminuted left intertrochanteric fracture to the base  of the femoral neck. There are degenerative changes of the left  hip joint. There is a moderate amount stool in the rectum. There  is a left adnexal cystic structure measuring 2.9 x 2.6 cm.  Recommend pelvic ultrasound in 6-12 weeks.      Impression:      CONCLUSION:   Comminuted left intertrochanteric proximal femoral fracture also  involving the base of the femoral neck.  Degenerative changes of the left hip.  Left adnexal cystic structure measuring 2.9 x 2.6 cm. Recommend  pelvic ultrasound in 6-12 weeks.    Electronically signed by:  Taye Poon MD  11/2/2021 5:30 PM CDT  Workstation: [id number]    XR Chest 1 View [[id number]] Collected: 11/02/21 1426     Updated: 11/02/21 1501    Narrative:      EXAM DESCRIPTION:     XR CHEST 1 VW    CLINICAL HISTORY:     81 years  Female  pre op, S72.002A Fracture of unspecified part  of neck of left femur, initial encounter for closed fracture    COMPARISON:     None  available    TECHNIQUE:     One view-AP radiograph the chest    FINDINGS:     The lungs are well-expanded and clear. The cardiac silhouette and  pulmonary vasculature are within normal limits. There are no  pleural effusions.      Impression:          1. No radiographic evidence of acute cardiopulmonary disease.        Electronically signed by:  Lexus Hernandez MD  11/2/2021 3:00 PM  CDT Workstation: [id number]    XR Hip With or Without Pelvis 2 - 3 View Left [[id number]] Collected: 11/02/21 1323     Updated: 11/02/21 1422    Narrative:      AP pelvis single view and left hip two views three views total  November 2, 2021    INDICATION: Patient fell today with acute onset pain    FINDINGS:  Limited study secondary to limitation in patient positioning.  Comminuted intertrochanteric fracture may extend into the femoral  neck. Multiple butterfly fragments suspected. No definite  anterior pelvic fracture.  Right hip grossly normal.  Degenerative changes lower lumbar region.      Impression:      Limited study.  Comminuted intertrochanteric fracture on the left may extend into  the femoral neck. Clinical correlation recommended. Depending  upon the clinical situation, he scan might be considered for more  complete evaluation.    Electronically signed by:  Kg Villa MD  11/2/2021 2:21 PM  CDT Workstation: [id number]            Chief Complaint on Day of Discharge: none    Hospital Course:  81 year old female with past medical history of HTN, HLD who presented on 11/2/2021 after suffering a fall that resulted in left hip fracture.  Fracture was repaired on 11/3 by Dr. De Luna.  She was provided with Xarelto for DVT prevention while hospitalized but transitioned to twice daily baby aspirin therapy for discharge per Dr. De Luna's instruction.  Patient remains deconditioned from her baseline and SNF was recommended however patient has opted for Pt/OT via home health with 24/7 care/assistance from her family.  She will discharge  "in stable condition with instructions for one week PCP and ortho follow up.  DME orders for rolling walker and bedside order were placed.     Condition on Discharge:  Stable    Physical Exam on Discharge:  /60 (BP Location: Right arm, Patient Position: Lying)   Pulse 85   Temp 96.7 °F (35.9 °C) (Oral)   Resp 18   Ht 162.6 cm (64\")   Wt 69.6 kg (153 lb 6.4 oz)   SpO2 95%   BMI 26.33 kg/m²   Physical Exam  Vitals and nursing note reviewed.   Constitutional:       General: She is not in acute distress.     Appearance: Normal appearance. She is not ill-appearing.   HENT:      Head: Normocephalic and atraumatic.      Right Ear: External ear normal.      Left Ear: External ear normal.      Nose: Nose normal.      Mouth/Throat:      Mouth: Mucous membranes are moist.      Pharynx: Oropharynx is clear.   Eyes:      General: No scleral icterus.        Right eye: No discharge.         Left eye: No discharge.      Conjunctiva/sclera: Conjunctivae normal.   Cardiovascular:      Rate and Rhythm: Normal rate and regular rhythm.      Pulses: Normal pulses.      Heart sounds: Normal heart sounds. No murmur heard.  No friction rub. No gallop.    Pulmonary:      Effort: Pulmonary effort is normal. No respiratory distress.      Breath sounds: Normal breath sounds. No stridor. No wheezing, rhonchi or rales.   Abdominal:      General: Bowel sounds are normal. There is no distension.      Palpations: Abdomen is soft.      Tenderness: There is no abdominal tenderness.   Musculoskeletal:         General: No swelling. Normal range of motion.      Cervical back: Normal range of motion and neck supple.   Skin:     General: Skin is warm and dry.      Comments: Left hip dressing clean, dry, intact  Neurological:      General: No focal deficit present.      Mental Status: She is alert and oriented to person, place, and time.   Psychiatric:         Mood and Affect: Mood normal.         Behavior: Behavior normal.     Discharge " Disposition:  Home-Health Care [facility]    Discharge Medications:     Discharge Medications      New Medications      Instructions Start Date   acetaminophen 325 MG tablet  Commonly known as: TYLENOL   650 mg, Oral, Every 4 Hours PRN      docusate sodium 100 MG capsule   100 mg, Oral, 2 Times Daily PRN      HYDROcodone-acetaminophen 5-325 MG per tablet  Commonly known as: NORCO   1 tablet, Oral, Every 4 Hours PRN      rivaroxaban 10 MG tablet  Commonly known as: XARELTO   10 mg, Oral, Daily         Continue These Medications      Instructions Start Date   carvedilol 3.125 MG tablet  Commonly known as: COREG   3.125 mg, Oral, 2 Times Daily With Meals      furosemide 20 MG tablet  Commonly known as: LASIX   1 tablet, Oral, Every Morning      potassium chloride 10 MEQ CR capsule  Commonly known as: MICRO-K   20 mEq, Oral, Daily      simvastatin 20 MG tablet  Commonly known as: ZOCOR   20 mg, Oral, Nightly             Discharge Diet:   Diet Instructions     Diet: Regular; Thin      Discharge Diet: Regular    Fluid Consistency: Thin          Activity at Discharge:   Activity Instructions     Activity as Tolerated            Discharge Care Plan/Instructions: Follow up with PCP and ortho in one week.     Follow-up Appointments:   Additional Instructions for the Follow-ups that You Need to Schedule     Ambulatory Referral to Home Health   As directed      Face to Face Visit Date: 11/5/2021    Follow-up provider for Plan of Care?: I treated the patient in an acute care facility and will not continue treatment after discharge.    Follow-up provider: NO KNOWN PROVIDER [[id number]]    Reason/Clinical Findings: deconditioning r/t left hip fx    Describe mobility limitations that make leaving home difficult: deconditioning r/t left hip fx    Nursing/Therapeutic Services Requested: Physical Therapy Occupational Therapy    PT orders: Strengthening    Occupational orders: Strengthening    Frequency: 1 Week 1         Discharge Follow-up with  PCP   As directed       Currently Documented PCP:    Provider, No Known    PCP Phone Number:    None     Follow Up Details: one week         Discharge Follow-up with Specified Provider: Annamarie; 1 Week   As directed      To: Annamarie    Follow Up: 1 Week            Contact information for follow-up providers     Provider, No Known .    Why: one week  Contact information:  [facility] [address]                   Contact information for after-discharge care     Durable Medical Equipment     [facility] .    Service: Durable Medical Equipment  Contact information:  [address] [facility] [address]  [phone]                 Home Medical Care     [facility] .    Service: Home Health Services  Contact information:  [address]   [facility] [address] [phone]                             Test Results Pending at Discharge:           This document has been electronically signed by EUGENE Juarez on November 7, 2021 09:31 CST        Time: 30 minutes spent on assessment, discussion, management, and discharge planning for this patient.                 Electronically signed by Barrie Zavala MD at 11/07/21 1398

## 2021-11-08 NOTE — HOME HEALTH
REASON FOR REFERRAL:  82 y/o female hospitalized at [facility] 11/2/21-11/7/21 after falling at home due to tripping on a rug.  Patient sustained a displaced left femoral neck fracture.  She underwent left hip trochanteric nailing with intramedullary hip screw 11/3/21 by Dr De Luna.  Patient reported that she is using BSC in her bedroom for her toileting needs.  BSC adjusted to patient's height.  Education completed to patient/patient's niece for shower transfer bench and home safety/fall prevention during ADLs/functional tasks.  Patient/caregiver verbalized understanding of completed education.    PMH:  HTN, HLD, CHF.  Patient reported history of B12 and vitamin D deficiencies.    PLOF:  Independent with ADLs, IADLs, functional transfers.functional mobility without AD, independent with IADLs (laundry, dishes, cleaning floors).  Independent out in the community with someone to drive her.  Does not drive.    HOME ENVIRONMENT:  Patient lives in one level home with her neice and 5 y/o great nephew.  She has a ramp to enter with B handrails.    PRECAUTIONS:  Fall Risk    MD APPTS:  11/9/21 Dr Crowe,     DME:  W/C, RW, BSC, gait belt, suction grab bar for shower, recommend shower bench.    AROM B UES:  WFL    STRENGTH B UES:5/5.    HAND DOMINANCE:  R hand dominant, B  strengths: 5/5.    REHAB POTENTIAL:  Good for Goals    WISH TO ADDRESS BATH/DRESSING WITH OT:  NO    PATIENT'S GOAL: None stated for OT.    128/72  18  84  97%

## 2021-11-08 NOTE — CASE COMMUNICATION
[facility]  / Case Conference Note    Based upon record review and collaboration conference, the recommended frequency for this patient is   SN-----  PT-----  OT----1      : 5  : 2   : 3

## 2021-11-09 VITALS
SYSTOLIC BLOOD PRESSURE: 128 MMHG | HEART RATE: 84 BPM | DIASTOLIC BLOOD PRESSURE: 72 MMHG | RESPIRATION RATE: 18 BRPM | TEMPERATURE: 97.4 F | OXYGEN SATURATION: 97 %

## 2021-11-09 NOTE — HOME HEALTH
REASON FOR REFERRAL:  Patient admitted to  11/2/2021-11/7/2021 after a fall at home with L femur fracture s/p IM nailing 11/3/2021.  She reports her pain isn't too bad but she needs help getting up and walking.  DIAGNOSIS: Weakness, abnormal gait  SURGICAL PROCEDURE: IM nailing L femur 11/3/2201  PERTINENT MEDICAL HISTORY:  HTN, HLD, CHF  PRIOR LEVEL OF FUNCTION: Patient ambulate all surfaces in home and community without AD  PATIENT GOAL FOR THIS EPISODE OF CARE: To walk  HOME SOCIAL ENVIRONMENT: Lives with niece in single story home with ramp to enter

## 2021-11-09 NOTE — CASE COMMUNICATION
Medical Necessity and focus of care: Patient referred to  PT and OT after a fall at home with L femur fracture to address decreased strength, decreased balance, impaired functional mobility.    Caregiver Status: Patient lives with niece who is available to assist with care    Patient's goal(s): To walk    GG Discharge Goal:  [id number]    Medication Issus: None    Environmental/ Physical issues: Uneven luan    Any additional needs:  None    Based upon record review and collaboration conference, the recommended frequency for this patient is    SN-----X    PT-----2w2    OT----Eval only at this time but may add in later    ST-----X    HHA---X    MSW---X    Provider Dr De Luna and agrees with POC    Please verify your eval scores: (Answer the scores that pertain to your area of focus remove the others)        - 2    - 3                M 170 0

## 2021-11-10 ENCOUNTER — READMISSION MANAGEMENT (OUTPATIENT)
Dept: CALL CENTER | Facility: HOSPITAL | Age: 81
End: 2021-11-10

## 2021-11-10 NOTE — OUTREACH NOTE
General Surgery Week 1 Survey      Responses   [facility] patient discharged from? [place]   Does the patient have one of the following disease processes/diagnoses(primary or secondary)? General Surgery   Week 1 attempt successful? Yes   Call start time 1036   Call end time 1042   Discharge diagnosis Closed displaced fracture of base of neck of left femur    Is patient permission given to speak with other caregiver? Yes   List who call center can speak with Donya Stockton reviewed with patient/caregiver? Yes   Is the patient having any side effects they believe may be caused by any medication additions or changes? No   Does the patient have all medications related to this admission filled (includes all antibiotics, pain medications, etc.) Yes   Is the patient taking all medications as directed (includes completed medication regime)? Yes   Does the patient have a follow up appointment scheduled with their surgeon? Yes   Has the patient kept scheduled appointments due by today? N/A   Comments Surgeon 11/15/2021   PCP 12/21/2021   What is the Home health agency?  [facility]   Has home health visited the patient within 72 hours of discharge? Yes   What DME was ordered? Pedrito CARRERA BSC     Getting a shower chair   Has all DME been delivered? Yes   Psychosocial issues? No   Did the patient receive a copy of their discharge instructions? Yes   Nursing interventions Reviewed instructions with patient   What is the patient's perception of their health status since discharge? Improving   Nursing interventions Nurse provided patient education   Is the patient /caregiver able to teach back basic post-op care? Take showers only when approved by MD-sponge bathe until then,  Drive as instructed by MD in discharge instructions,  Lifting as instructed by MD in discharge instructions,  Keep incision areas clean,dry and protected,  Practice 'cough and deep breath',  No tub bath, swimming, or hot tub until instructed by MD    Is the patient/caregiver able to teach back signs and symptoms of incisional infection? Increased redness, swelling or pain at the incisonal site,  Increased drainage or bleeding,  Incisional warmth,  Pus or odor from incision,  Fever   Is the patient/caregiver able to teach back steps to recovery at home? Eat a well-balance diet   Is the patient/caregiver able to teach back the hierarchy of who to call/visit for symptoms/problems? PCP, Specialist, Home health nurse, Urgent Care, ED, 911 Yes   Week 1 call completed? Yes          Malissa Barnett RN

## 2021-11-12 ENCOUNTER — HOME CARE VISIT (OUTPATIENT)
Dept: HOME HEALTH SERVICES | Facility: CLINIC | Age: 81
End: 2021-11-12

## 2021-11-12 VITALS
HEART RATE: 68 BPM | TEMPERATURE: 98.3 F | SYSTOLIC BLOOD PRESSURE: 116 MMHG | OXYGEN SATURATION: 96 % | RESPIRATION RATE: 18 BRPM | DIASTOLIC BLOOD PRESSURE: 62 MMHG

## 2021-11-12 DIAGNOSIS — S72.002A DISPLACED FRACTURE OF LEFT FEMORAL NECK (HCC): Primary | ICD-10-CM

## 2021-11-12 PROCEDURE — G0157 HHC PT ASSISTANT EA 15: HCPCS

## 2021-11-12 PROCEDURE — G0180 MD CERTIFICATION HHA PATIENT: HCPCS | Performed by: ORTHOPAEDIC SURGERY

## 2021-11-12 NOTE — HOME HEALTH
pt sitting on couch w/ small dog and cat in pts lap, and reports feeling pretty good today; pt has sister present to assist pt if/when needed for safety in home; pt reports has scheduled appointment w/ Ortho MD on 11/15/21

## 2021-11-15 ENCOUNTER — OFFICE VISIT (OUTPATIENT)
Dept: ORTHOPEDIC SURGERY | Facility: CLINIC | Age: 81
End: 2021-11-15

## 2021-11-15 VITALS — HEIGHT: 64 IN | OXYGEN SATURATION: 98 % | HEART RATE: 72 BPM | WEIGHT: 153 LBS | BODY MASS INDEX: 26.12 KG/M2

## 2021-11-15 DIAGNOSIS — S72.142D CLOSED DISPLACED INTERTROCHANTERIC FRACTURE OF LEFT FEMUR WITH ROUTINE HEALING, SUBSEQUENT ENCOUNTER: Primary | ICD-10-CM

## 2021-11-15 PROCEDURE — 99024 POSTOP FOLLOW-UP VISIT: CPT | Performed by: ORTHOPAEDIC SURGERY

## 2021-11-15 NOTE — PROGRESS NOTES
"Postop Follow-up    Name:  Corina Lenz  Date:  11/15/2021  :  1940    Chief Complaint:    Chief Complaint   Patient presents with   • Left Hip - Post-op Follow-up     Date of surgery:     21 (12d) Leobardo De Luna MD    Hip Trochanteric Nailing Short With Intramedullary Hip Screw - Left         Procedure:    History of Present Illness:  Patient in for post-op follow up of left hip fracture.  Xray completed upon arrival.     This is the first office follow-up for a fracture of the left hip.    Mrs. Lenz is 81 years old.  She sustained a cervicotrochanteric fracture of the femur and was treated with a short intramedullary nail on 3 November.  Her pain is been well controlled.  She has been occasionally full weightbearing.          Current Outpatient Medications:   •  acetaminophen (TYLENOL) 325 MG tablet, Take 2 tablets by mouth Every 4 (Four) Hours As Needed for Mild Pain ., Disp: , Rfl:   •  aspirin (aspirin) 81 MG EC tablet, Take 1 tablet by mouth 2 (Two) Times a Day for 30 days., Disp: 60 tablet, Rfl: 0  •  carvedilol (COREG) 3.125 MG tablet, Take 3.125 mg by mouth 2 (Two) Times a Day With Meals., Disp: , Rfl:   •  docusate sodium 100 MG capsule, Take 1 capsule by mouth 2 (Two) Times a Day As Needed for Constipation., Disp: , Rfl:   •  furosemide (LASIX) 20 MG tablet, Take 1 tablet by mouth Every Morning., Disp: , Rfl:   •  potassium chloride (MICRO-K) 10 MEQ CR capsule, Take 20 mEq by mouth Daily., Disp: , Rfl:   •  simvastatin (ZOCOR) 20 MG tablet, Take 20 mg by mouth Every Night., Disp: , Rfl:     No Known Allergies      Exam: She is alert pleasant and in no apparent distress.  She is calm.    The thighs and calves are soft.  Gentle motion of the left hip produces no crepitus and no apparent pain.  Her incisions are clean and dry with no evidence of infection.      Vitals:    11/15/21 1336   Pulse: 72   SpO2: 98%   Weight: 69.4 kg (153 lb)   Height: 162.6 cm (64\")               XR Hip With or " Without Pelvis 2 - 3 View Left    Result Date: 11/15/2021  Ordering Provider:  Leobardo De Luna MD Ordering Diagnosis/Indication:  Displaced fracture of left femoral neck (HCC) Procedure:  XR HIP W OR WO PELVIS 2-3 VIEW LEFT Exam Date:  11/15/21 COMPARISON: Exam of the hip dated 3 November 2021      Radiographs of the left hip AP and lateral views done today show an intertrochanteric fracture of the femur stabilized with a trochanteric fixation nail.  Position of the fracture and hardware remains satisfactory.  She has prominent degenerative change in the left hip. 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    She may weight-bear as tolerated.  Her staples were removed and Steri-Strips placed.  She may advance activities as tolerated.    Return here in 1 month for repeat x-rays of the left hip.      Diagnoses and all orders for this visit:    Closed displaced intertrochanteric fracture of left femur with routine healing, subsequent encounter    Other orders  -     Cancel: XR Hip With or Without Pelvis 2 - 3 View Left; Future          Plan:        Return in about 4 weeks (around 12/13/2021).      11/15/21 at 13:31 CST by Leobardo De Luna MD

## 2021-11-16 ENCOUNTER — HOME CARE VISIT (OUTPATIENT)
Dept: HOME HEALTH SERVICES | Facility: CLINIC | Age: 81
End: 2021-11-16

## 2021-11-16 PROCEDURE — G0157 HHC PT ASSISTANT EA 15: HCPCS

## 2021-11-17 ENCOUNTER — READMISSION MANAGEMENT (OUTPATIENT)
Dept: CALL CENTER | Facility: HOSPITAL | Age: 81
End: 2021-11-17

## 2021-11-17 NOTE — OUTREACH NOTE
General Surgery Week 2 Survey      Responses   [facility] patient discharged from? [place]   Does the patient have one of the following disease processes/diagnoses(primary or secondary)? General Surgery   Week 2 attempt successful? Yes   Call start time 1316   Call end time 1317   Discharge diagnosis Closed displaced fracture of base of neck of left femur    Is the patient taking all medications as directed (includes completed medication regime)? Yes   Has the patient kept scheduled appointments due by today? Yes   What is the Home health agency?  [facility]   Psychosocial issues? No   What is the patient's perception of their health status since discharge? Improving   Nursing interventions Nurse provided patient education   Is the patient /caregiver able to teach back basic post-op care? Lifting as instructed by MD in discharge instructions,  No tub bath, swimming, or hot tub until instructed by MD   Is the patient/caregiver able to teach back signs and symptoms of incisional infection? Fever   Is the patient/caregiver able to teach back the hierarchy of who to call/visit for symptoms/problems? PCP, Specialist, Home health nurse, Urgent Care, ED, 911 Yes   Week 2 call completed? Yes   Revoked No further contact(revokes)-requires comment   Is the patient interested in additional calls from an ambulatory ?  NOTE:  applies to high risk patients requiring additional follow-up. No   Graduated/Revoked comments Says she is doing well, no further calls needed.          Cindy Morgan RN

## 2021-11-18 ENCOUNTER — HOME CARE VISIT (OUTPATIENT)
Dept: HOME HEALTH SERVICES | Facility: CLINIC | Age: 81
End: 2021-11-18

## 2021-11-18 VITALS
DIASTOLIC BLOOD PRESSURE: 62 MMHG | TEMPERATURE: 98.3 F | OXYGEN SATURATION: 98 % | RESPIRATION RATE: 18 BRPM | SYSTOLIC BLOOD PRESSURE: 120 MMHG | HEART RATE: 70 BPM

## 2021-11-18 DIAGNOSIS — S72.142D CLOSED DISPLACED INTERTROCHANTERIC FRACTURE OF LEFT FEMUR WITH ROUTINE HEALING, SUBSEQUENT ENCOUNTER: Primary | ICD-10-CM

## 2021-11-18 PROCEDURE — G0157 HHC PT ASSISTANT EA 15: HCPCS

## 2021-11-18 RX ORDER — HYDROCODONE BITARTRATE AND ACETAMINOPHEN 5; 325 MG/1; MG/1
1 TABLET ORAL EVERY 4 HOURS PRN
Qty: 18 TABLET | Refills: 0 | Status: SHIPPED | OUTPATIENT
Start: 2021-11-18 | End: 2022-03-16

## 2021-11-18 NOTE — TELEPHONE ENCOUNTER
PT CALLED TO ASK IF DR. ZHOU WOULD CALL IN A REFILL FOR NORCO TO BE SENT TO [facility] IN [place].

## 2021-11-22 VITALS
TEMPERATURE: 98.3 F | SYSTOLIC BLOOD PRESSURE: 122 MMHG | HEART RATE: 74 BPM | DIASTOLIC BLOOD PRESSURE: 76 MMHG | RESPIRATION RATE: 18 BRPM | OXYGEN SATURATION: 98 %

## 2021-11-23 ENCOUNTER — HOME CARE VISIT (OUTPATIENT)
Dept: HOME HEALTH SERVICES | Facility: CLINIC | Age: 81
End: 2021-11-23

## 2021-11-23 PROCEDURE — G0157 HHC PT ASSISTANT EA 15: HCPCS

## 2021-11-24 ENCOUNTER — HOME CARE VISIT (OUTPATIENT)
Dept: HOME HEALTH SERVICES | Facility: CLINIC | Age: 81
End: 2021-11-24

## 2021-11-24 VITALS
DIASTOLIC BLOOD PRESSURE: 60 MMHG | SYSTOLIC BLOOD PRESSURE: 112 MMHG | OXYGEN SATURATION: 98 % | HEART RATE: 76 BPM | TEMPERATURE: 97.3 F | RESPIRATION RATE: 16 BRPM

## 2021-11-24 PROCEDURE — G0157 HHC PT ASSISTANT EA 15: HCPCS

## 2021-11-26 VITALS
SYSTOLIC BLOOD PRESSURE: 120 MMHG | OXYGEN SATURATION: 98 % | HEART RATE: 80 BPM | RESPIRATION RATE: 18 BRPM | TEMPERATURE: 97.6 F | DIASTOLIC BLOOD PRESSURE: 80 MMHG

## 2021-11-29 ENCOUNTER — HOME CARE VISIT (OUTPATIENT)
Dept: HOME HEALTH SERVICES | Facility: CLINIC | Age: 81
End: 2021-11-29

## 2021-11-29 PROCEDURE — G0157 HHC PT ASSISTANT EA 15: HCPCS

## 2021-11-30 VITALS
SYSTOLIC BLOOD PRESSURE: 122 MMHG | OXYGEN SATURATION: 99 % | RESPIRATION RATE: 16 BRPM | DIASTOLIC BLOOD PRESSURE: 68 MMHG | HEART RATE: 74 BPM | TEMPERATURE: 97 F

## 2021-12-02 ENCOUNTER — HOME CARE VISIT (OUTPATIENT)
Dept: HOME HEALTH SERVICES | Facility: CLINIC | Age: 81
End: 2021-12-02

## 2021-12-02 PROCEDURE — G0151 HHCP-SERV OF PT,EA 15 MIN: HCPCS

## 2021-12-02 NOTE — HOME HEALTH
Patient reports she has been able to go up and down her ramp with her walker.  She does her exercises several times a day.  Discussed referral to outpatient PT but patient wants to wait until she sees the doctor on 12/13/2021.  She feels she is ready for discharge at this time.

## 2021-12-03 VITALS
OXYGEN SATURATION: 99 % | RESPIRATION RATE: 17 BRPM | DIASTOLIC BLOOD PRESSURE: 64 MMHG | SYSTOLIC BLOOD PRESSURE: 122 MMHG | TEMPERATURE: 97.5 F | HEART RATE: 76 BPM

## 2021-12-10 DIAGNOSIS — S72.142D CLOSED DISPLACED INTERTROCHANTERIC FRACTURE OF LEFT FEMUR WITH ROUTINE HEALING, SUBSEQUENT ENCOUNTER: Primary | ICD-10-CM

## 2021-12-13 ENCOUNTER — OFFICE VISIT (OUTPATIENT)
Dept: ORTHOPEDIC SURGERY | Facility: CLINIC | Age: 81
End: 2021-12-13

## 2021-12-13 VITALS — BODY MASS INDEX: 26.12 KG/M2 | WEIGHT: 153 LBS | HEIGHT: 64 IN

## 2021-12-13 DIAGNOSIS — S72.142D CLOSED DISPLACED INTERTROCHANTERIC FRACTURE OF LEFT FEMUR WITH ROUTINE HEALING, SUBSEQUENT ENCOUNTER: Primary | ICD-10-CM

## 2021-12-13 PROCEDURE — 99024 POSTOP FOLLOW-UP VISIT: CPT | Performed by: ORTHOPAEDIC SURGERY

## 2021-12-13 NOTE — PROGRESS NOTES
"Postop Follow-up    Name:  Corina Lenz  Date:  2021  :  1940    Chief Complaint:  No chief complaint on file.    Date of surgery:     21 (5w 5d) Leobardo De Luna MD    Hip Trochanteric Nailing Short With Intramedullary Hip Screw - Left       Procedure:    History of Present Illness:  Patient in for post-op follow up of left hip  Xray completed upon arrival.  Patient has no complaints of pain.     Mrs. Lenz had her surgery on 3 November.  She is having little pain and is occasionally full weightbearing.        Current Outpatient Medications:   •  acetaminophen (TYLENOL) 325 MG tablet, Take 2 tablets by mouth Every 4 (Four) Hours As Needed for Mild Pain ., Disp: , Rfl:   •  carvedilol (COREG) 3.125 MG tablet, Take 3.125 mg by mouth 2 (Two) Times a Day With Meals., Disp: , Rfl:   •  docusate sodium 100 MG capsule, Take 1 capsule by mouth 2 (Two) Times a Day As Needed for Constipation., Disp: , Rfl:   •  furosemide (LASIX) 20 MG tablet, Take 1 tablet by mouth Every Morning., Disp: , Rfl:   •  HYDROcodone-acetaminophen (NORCO) 5-325 MG per tablet, Take 1 tablet by mouth Every 4 (Four) Hours As Needed for Moderate Pain ., Disp: 18 tablet, Rfl: 0  •  potassium chloride (MICRO-K) 10 MEQ CR capsule, Take 20 mEq by mouth Daily., Disp: , Rfl:   •  simvastatin (ZOCOR) 20 MG tablet, Take 20 mg by mouth Every Night., Disp: , Rfl:     No Known Allergies      Exam: He is alert and in no apparent distress.    She walks with a short stride which does not appear to be antalgic in nature.  The thighs and calves are soft.  Gentle rotation of the left hip produces no crepitus and no apparent pain.  Vitals:    21 1336   Weight: 69.4 kg (153 lb)   Height: 162.6 cm (64\")               XR Hip With or Without Pelvis 2 - 3 View Left    Result Date: 2021  Narrative: Ordering Provider:  Leobardo De Luna MD Ordering Diagnosis/Indication:  Closed displaced intertrochanteric fracture of left femur with routine " healing, subsequent encounter Procedure:  XR HIP W OR WO PELVIS 2-3 VIEW LEFT Exam Date:  12/13/21 COMPARISON: Exam of the hip dated 15 November 2021     Impression:  Radiographs of the left hip AP and lateral views done today show an intertrochanteric fracture of the femur stabilized with a trochanteric fixation nail.  Position of the fracture and hardware remains stable.  There is a suggestion of early callus formation.  She also has significant degenerative change of the hip. Leobardo De Luna MD 12/13/21    XR Hip With or Without Pelvis 2 - 3 View Left    Result Date: 11/15/2021  Narrative: Ordering Provider:  Leobardo De Luna MD Ordering Diagnosis/Indication:  Displaced fracture of left femoral neck (HCC) Procedure:  XR HIP W OR WO PELVIS 2-3 VIEW LEFT Exam Date:  11/15/21 COMPARISON: Exam of the hip dated 3 November 2021     Impression:  Radiographs of the left hip AP and lateral views done today show an intertrochanteric fracture of the femur stabilized with a trochanteric fixation nail.  Position of the fracture and hardware remains satisfactory.  She has prominent degenerative change in the left hip. Leobardo De Luna MD 11/15/21        Assessment: Healing fracture left hip.    She will continue to use a walker or cane for stability.  She may continue full weightbearing.    Return here in 1 month for x-rays of the hip.      Diagnoses and all orders for this visit:    Closed displaced intertrochanteric fracture of left femur with routine healing, subsequent encounter          Plan:        Return in about 4 weeks (around 1/10/2022).      12/13/21 at 13:35 CST by Leobardo De Luna MD

## 2022-01-07 DIAGNOSIS — S72.142D CLOSED DISPLACED INTERTROCHANTERIC FRACTURE OF LEFT FEMUR WITH ROUTINE HEALING, SUBSEQUENT ENCOUNTER: Primary | ICD-10-CM

## 2022-01-10 ENCOUNTER — OFFICE VISIT (OUTPATIENT)
Dept: ORTHOPEDIC SURGERY | Facility: CLINIC | Age: 82
End: 2022-01-10

## 2022-01-10 VITALS — OXYGEN SATURATION: 96 % | WEIGHT: 153 LBS | BODY MASS INDEX: 26.12 KG/M2 | HEART RATE: 122 BPM | HEIGHT: 64 IN

## 2022-01-10 DIAGNOSIS — S72.142D CLOSED DISPLACED INTERTROCHANTERIC FRACTURE OF LEFT FEMUR WITH ROUTINE HEALING, SUBSEQUENT ENCOUNTER: Primary | ICD-10-CM

## 2022-01-10 PROBLEM — S72.142A CLOSED DISPLACED INTERTROCHANTERIC FRACTURE OF LEFT FEMUR: Status: ACTIVE | Noted: 2022-01-10

## 2022-01-10 PROCEDURE — 99024 POSTOP FOLLOW-UP VISIT: CPT | Performed by: ORTHOPAEDIC SURGERY

## 2022-01-10 NOTE — PROGRESS NOTES
Corina Lenz is a 81 y.o. female is s/p     11/03/21 (9w 5d) Leobardo De Luna MD    Hip Trochanteric Nailing Short With Intramedullary Hip Screw - Left          Chief Complaint   Patient presents with   • Left Hip - Follow-up, Pain   • Wound Check       HISTORY OF PRESENT ILLNESS:4 week follow up left hip, with x-rays done today in office.  Pain scale today     Mrs. Lenz is now 2 months following surgery.  She is having little to no pain.       No Known Allergies      Current Outpatient Medications:   •  carvedilol (COREG) 3.125 MG tablet, Take 3.125 mg by mouth 2 (Two) Times a Day With Meals., Disp: , Rfl:   •  docusate sodium 100 MG capsule, Take 1 capsule by mouth 2 (Two) Times a Day As Needed for Constipation., Disp: , Rfl:   •  furosemide (LASIX) 20 MG tablet, Take 1 tablet by mouth Every Morning., Disp: , Rfl:   •  HYDROcodone-acetaminophen (NORCO) 5-325 MG per tablet, Take 1 tablet by mouth Every 4 (Four) Hours As Needed for Moderate Pain ., Disp: 18 tablet, Rfl: 0  •  potassium chloride (MICRO-K) 10 MEQ CR capsule, Take 20 mEq by mouth Daily., Disp: , Rfl:   •  simvastatin (ZOCOR) 20 MG tablet, Take 20 mg by mouth Every Night., Disp: , Rfl:   •  acetaminophen (TYLENOL) 325 MG tablet, Take 2 tablets by mouth Every 4 (Four) Hours As Needed for Mild Pain ., Disp: , Rfl:     No fevers or chills.  No nausea or vomiting.      PHYSICAL EXAMINATION:       Corina Lenz is a 81 y.o. female    Patient is awake and alert, answers questions appropriately and is in no apparent distress.    GAIT:     [x]  Normal  []  Antalgic    Assistive device: []  None  [x]  Walker     []  Crutches  []  Cane     []  Wheelchair  []  Stretcher    Ortho Exam walks with a short stride which does not appear to be antalgic in nature.  Motion of the left hip is smooth and painless.    XR Hip With or Without Pelvis 2 - 3 View Left    Result Date: 1/10/2022  Ordering Provider:  Leobardo De Luna MD Ordering Diagnosis/Indication:  Closed  displaced intertrochanteric fracture of left femur with routine healing, subsequent encounter Procedure:  XR HIP W OR WO PELVIS 2-3 VIEW LEFT Exam Date:  1/10/22 COMPARISON: Exam of the hip dated December 2021      Radiographs of the left hip AP and lateral views done today show a trochanteric fixation nail stabilizing an intertrochanteric fracture of the femur.  Position of the fracture is unchanged.  There is been progression of callus remission.  There is also moderately severe degenerative change of the hip. Leobardo De Luna MD 1/10/22        ASSESSMENT: Early healing intertrochanteric fracture left femur.    She may continue to advance activities as tolerated.  She will use a walker or cane as needed for stability.    No routine follow-up is needed.    Diagnoses and all orders for this visit:    Closed displaced intertrochanteric fracture of left femur with routine healing, subsequent encounter          PLAN    Return if symptoms worsen or fail to improve.    Leobardo De Luna MD

## 2022-03-16 ENCOUNTER — LAB (OUTPATIENT)
Dept: LAB | Facility: HOSPITAL | Age: 82
End: 2022-03-16

## 2022-03-16 ENCOUNTER — OFFICE VISIT (OUTPATIENT)
Dept: FAMILY MEDICINE CLINIC | Facility: CLINIC | Age: 82
End: 2022-03-16

## 2022-03-16 VITALS
OXYGEN SATURATION: 97 % | SYSTOLIC BLOOD PRESSURE: 140 MMHG | BODY MASS INDEX: 23.22 KG/M2 | HEIGHT: 64 IN | WEIGHT: 136 LBS | DIASTOLIC BLOOD PRESSURE: 90 MMHG | HEART RATE: 85 BPM

## 2022-03-16 DIAGNOSIS — I50.22 CHRONIC SYSTOLIC (CONGESTIVE) HEART FAILURE: Primary | ICD-10-CM

## 2022-03-16 DIAGNOSIS — I50.22 CHRONIC SYSTOLIC (CONGESTIVE) HEART FAILURE: ICD-10-CM

## 2022-03-16 DIAGNOSIS — R73.09 ELEVATED GLUCOSE: ICD-10-CM

## 2022-03-16 DIAGNOSIS — Z83.3 FAMILY HISTORY OF DIABETES MELLITUS: ICD-10-CM

## 2022-03-16 DIAGNOSIS — Z76.89 ENCOUNTER TO ESTABLISH CARE: ICD-10-CM

## 2022-03-16 DIAGNOSIS — E78.5 HYPERLIPIDEMIA, UNSPECIFIED HYPERLIPIDEMIA TYPE: ICD-10-CM

## 2022-03-16 DIAGNOSIS — Z12.31 ENCOUNTER FOR SCREENING MAMMOGRAM FOR MALIGNANT NEOPLASM OF BREAST: ICD-10-CM

## 2022-03-16 DIAGNOSIS — Z78.0 POSTMENOPAUSAL: ICD-10-CM

## 2022-03-16 LAB
ALBUMIN SERPL-MCNC: 4.3 G/DL (ref 3.5–5.2)
ALBUMIN/GLOB SERPL: 1.3 G/DL
ALP SERPL-CCNC: 40 U/L (ref 39–117)
ALT SERPL W P-5'-P-CCNC: 16 U/L (ref 1–33)
ANION GAP SERPL CALCULATED.3IONS-SCNC: 10 MMOL/L (ref 5–15)
AST SERPL-CCNC: 22 U/L (ref 1–32)
BASOPHILS # BLD AUTO: 0.02 10*3/MM3 (ref 0–0.2)
BASOPHILS NFR BLD AUTO: 0.4 % (ref 0–1.5)
BILIRUB SERPL-MCNC: 0.6 MG/DL (ref 0–1.2)
BUN SERPL-MCNC: 16 MG/DL (ref 8–23)
BUN/CREAT SERPL: 19 (ref 7–25)
CALCIUM SPEC-SCNC: 9.6 MG/DL (ref 8.6–10.5)
CHLORIDE SERPL-SCNC: 104 MMOL/L (ref 98–107)
CHOLEST SERPL-MCNC: 187 MG/DL (ref 0–200)
CO2 SERPL-SCNC: 24 MMOL/L (ref 22–29)
CREAT SERPL-MCNC: 0.84 MG/DL (ref 0.57–1)
DEPRECATED RDW RBC AUTO: 48 FL (ref 37–54)
EGFRCR SERPLBLD CKD-EPI 2021: 70.4 ML/MIN/1.73
EOSINOPHIL # BLD AUTO: 0.14 10*3/MM3 (ref 0–0.4)
EOSINOPHIL NFR BLD AUTO: 3 % (ref 0.3–6.2)
ERYTHROCYTE [DISTWIDTH] IN BLOOD BY AUTOMATED COUNT: 15 % (ref 12.3–15.4)
GLOBULIN UR ELPH-MCNC: 3.3 GM/DL
GLUCOSE SERPL-MCNC: 85 MG/DL (ref 65–99)
HBA1C MFR BLD: 5.3 % (ref 4.8–5.6)
HCT VFR BLD AUTO: 39.5 % (ref 34–46.6)
HDLC SERPL-MCNC: 41 MG/DL (ref 40–60)
HGB BLD-MCNC: 12.9 G/DL (ref 12–15.9)
IMM GRANULOCYTES # BLD AUTO: 0.02 10*3/MM3 (ref 0–0.05)
IMM GRANULOCYTES NFR BLD AUTO: 0.4 % (ref 0–0.5)
LDLC SERPL CALC-MCNC: 123 MG/DL (ref 0–100)
LDLC/HDLC SERPL: 2.95 {RATIO}
LYMPHOCYTES # BLD AUTO: 1.81 10*3/MM3 (ref 0.7–3.1)
LYMPHOCYTES NFR BLD AUTO: 38.5 % (ref 19.6–45.3)
MCH RBC QN AUTO: 29 PG (ref 26.6–33)
MCHC RBC AUTO-ENTMCNC: 32.7 G/DL (ref 31.5–35.7)
MCV RBC AUTO: 88.8 FL (ref 79–97)
MONOCYTES # BLD AUTO: 0.37 10*3/MM3 (ref 0.1–0.9)
MONOCYTES NFR BLD AUTO: 7.9 % (ref 5–12)
NEUTROPHILS NFR BLD AUTO: 2.34 10*3/MM3 (ref 1.7–7)
NEUTROPHILS NFR BLD AUTO: 49.8 % (ref 42.7–76)
NRBC BLD AUTO-RTO: 0 /100 WBC (ref 0–0.2)
PLATELET # BLD AUTO: 154 10*3/MM3 (ref 140–450)
PMV BLD AUTO: 10.1 FL (ref 6–12)
POTASSIUM SERPL-SCNC: 4.3 MMOL/L (ref 3.5–5.2)
PROT SERPL-MCNC: 7.6 G/DL (ref 6–8.5)
RBC # BLD AUTO: 4.45 10*6/MM3 (ref 3.77–5.28)
SODIUM SERPL-SCNC: 138 MMOL/L (ref 136–145)
TRIGL SERPL-MCNC: 126 MG/DL (ref 0–150)
TSH SERPL DL<=0.05 MIU/L-ACNC: 2.9 UIU/ML (ref 0.27–4.2)
VLDLC SERPL-MCNC: 23 MG/DL (ref 5–40)
WBC NRBC COR # BLD: 4.7 10*3/MM3 (ref 3.4–10.8)

## 2022-03-16 PROCEDURE — 36415 COLL VENOUS BLD VENIPUNCTURE: CPT

## 2022-03-16 PROCEDURE — 84443 ASSAY THYROID STIM HORMONE: CPT

## 2022-03-16 PROCEDURE — 80061 LIPID PANEL: CPT

## 2022-03-16 PROCEDURE — 85025 COMPLETE CBC W/AUTO DIFF WBC: CPT

## 2022-03-16 PROCEDURE — 80053 COMPREHEN METABOLIC PANEL: CPT

## 2022-03-16 PROCEDURE — 83036 HEMOGLOBIN GLYCOSYLATED A1C: CPT

## 2022-03-16 PROCEDURE — 99203 OFFICE O/P NEW LOW 30 MIN: CPT | Performed by: FAMILY MEDICINE

## 2022-03-16 RX ORDER — ERGOCALCIFEROL 1.25 MG/1
CAPSULE ORAL
COMMUNITY
Start: 2022-02-05 | End: 2022-05-03 | Stop reason: SDUPTHER

## 2022-03-16 RX ORDER — POTASSIUM CHLORIDE 1500 MG/1
TABLET, EXTENDED RELEASE ORAL
COMMUNITY
Start: 2022-03-13 | End: 2022-07-18

## 2022-03-16 NOTE — PROGRESS NOTES
Chief Complaint  Establish Care and Hyperlipidemia    Subjective    History of Present Illness {CC  Problem List  Visit  Diagnosis   Encounters  Notes  Medications  Labs  Result Review Imaging  Media :23}     Corina Lenz presents to [facility] [place] for     Chief Complaint   Patient presents with   • Establish Care   • Hyperlipidemia      Patient seen today to establish care.  Has current medical problems including hypertension, heart failure, GERD, hyperlipidemia and vitamin D deficiency.    Past Medical History:   Diagnosis Date   • CHF (congestive heart failure) (HCC)    • HLD (hyperlipidemia)    • Hypertension      Past Surgical History:   Procedure Laterality Date   • HIP TROCHANTERIC NAILING WITH INTRAMEDULLARY HIP SCREW Left 11/3/2021    Procedure: HIP TROCHANTERIC NAILING SHORT WITH INTRAMEDULLARY HIP SCREW;  Surgeon: Leobardo De Luna MD;  Location: [facility];  Service: Orthopedics;  Laterality: Left;     Family History   Problem Relation Age of Onset   • Heart disease Mother    • Cancer Mother    • Diabetes Father    • Heart disease Father    • Cancer Father    • Cancer Sister    • Diabetes Sister      Social History     Socioeconomic History   • Marital status:    Tobacco Use   • Smoking status: Never Smoker   • Smokeless tobacco: Never Used   Vaping Use   • Vaping Use: Never used   Substance and Sexual Activity   • Alcohol use: Never   • Drug use: Never   • Sexual activity: Defer       Current Outpatient Medications on File Prior to Visit   Medication Sig Dispense Refill   • acetaminophen (TYLENOL) 325 MG tablet Take 2 tablets by mouth Every 4 (Four) Hours As Needed for Mild Pain .     • carvedilol (COREG) 3.125 MG tablet Take 3.125 mg by mouth 2 (Two) Times a Day With Meals.     • furosemide (LASIX) 20 MG tablet Take 1 tablet by mouth Every Morning.     • KLOR-CON 20 MEQ CR tablet      • potassium chloride (MICRO-K) 10 MEQ CR capsule  "Take 20 mEq by mouth Daily.     • simvastatin (ZOCOR) 20 MG tablet Take 20 mg by mouth Every Night.     • vitamin D (ERGOCALCIFEROL) 1.25 MG (94244 UT) capsule capsule TAKE 1 CAPSULE BY MOUTH ONCE A MONTH     • [DISCONTINUED] docusate sodium 100 MG capsule Take 1 capsule by mouth 2 (Two) Times a Day As Needed for Constipation.     • [DISCONTINUED] HYDROcodone-acetaminophen (NORCO) 5-325 MG per tablet Take 1 tablet by mouth Every 4 (Four) Hours As Needed for Moderate Pain . 18 tablet 0     No current facility-administered medications on file prior to visit.      No Known Allergies    Objective       Vital Signs:   /90   Pulse 85   Ht 162.6 cm (64\")   Wt 61.7 kg (136 lb)   SpO2 97%   BMI 23.34 kg/m²     Physical Exam  Vitals reviewed.   Constitutional:       General: She is not in acute distress.     Appearance: She is well-developed.   Cardiovascular:      Rate and Rhythm: Normal rate and regular rhythm.      Heart sounds: Normal heart sounds. No murmur heard.  Pulmonary:      Effort: Pulmonary effort is normal. No respiratory distress.      Breath sounds: Normal breath sounds. No wheezing or rales.   Abdominal:      Palpations: Abdomen is soft.      Tenderness: There is no abdominal tenderness.   Skin:     General: Skin is warm and dry.      Findings: No rash.   Neurological:      Mental Status: She is alert and oriented to person, place, and time.        Result Review :{ Labs  Result Review  Imaging  Med Tab  Media :23}   The following data was reviewed by: Lo Aguilar MD on 03/16/2022    Common labs    Common Labsle 11/2/21 11/2/21 11/3/21 11/3/21 11/4/21 11/4/21    1451 1451 0525 0525 [id number] [id number]   Glucose  122 (A)  118 (A)  119 (A)   BUN  13  11  8   Creatinine  0.78  0.59  0.68   eGFR Non  Am  71  98  83   Sodium  138  133 (A)  135 (A)   Potassium  3.5  3.8  3.5   Chloride  103  100  104   Calcium  9.4  8.9  8.5 (A)   Albumin  4.30  3.80     Total Bilirubin  0.6  1.0     Alkaline " Phosphatase  40  32 (A)     AST (SGOT)  35 (A)  22     ALT (SGPT)  17  14     WBC 8.71  6.56  5.51    Hemoglobin 13.0  11.1 (A)  9.9 (A)    Hematocrit 39.1  32.8 (A)  29.9 (A)    Platelets 158  152  113 (A)    (A) Abnormal value                    Assessment and Plan {CC Problem List  Visit Diagnosis  ROS  Review (Popup)  Health Maintenance  Quality  BestPractice  Medications  SmartSets  SnapShot Encounters  Media :23}   Diagnoses and all orders for this visit:    1. Chronic systolic (congestive) heart failure (HCC)  (Primary)  -     Lipid Panel; Future  -     CBC & Differential; Future  -     Comprehensive Metabolic Panel; Future  -     TSH; Future  -     Adult Transthoracic Echo Complete W/ Cont if Necessary Per Protocol; Future    2. Hyperlipidemia, unspecified hyperlipidemia type  -     Lipid Panel; Future    3. Elevated glucose  -     Hemoglobin A1c; Future    4. Family history of diabetes mellitus  -     Hemoglobin A1c; Future    5. Encounter for screening mammogram for malignant neoplasm of breast  -     Mammo Screening Digital Tomosynthesis Bilateral With CAD; Future    6. Postmenopausal  -     DEXA Bone Density Axial; Future    7. Encounter to establish care       Echocardiogram for evaluation of heart failure  Check labs, lipid panel, CBC and CMP  We will also check TSH  Continue current medications for now  Elevated glucose on labs and family history of diabetes, will screen with hemoglobin A1c  Recommended screenings and immunizations discussed  Due for mammogram, ordered today  Due for DEXA bone scan, osteoporosis screening ordered        Follow Up {Instructions Charge Capture  Follow-up Communications :23}   Return in about 4 months (around 7/16/2022) for Medicare Wellness.  Patient was given instructions and counseling regarding her condition or for health maintenance advice. Please see specific information pulled into the AVS if appropriate.              This document has been  electronically signed by Lo Aguilar MD

## 2022-03-24 ENCOUNTER — TELEPHONE (OUTPATIENT)
Dept: FAMILY MEDICINE CLINIC | Facility: CLINIC | Age: 82
End: 2022-03-24

## 2022-03-24 NOTE — TELEPHONE ENCOUNTER
Per , Ms. Lenz has been called with recent lab results and recommendations  Continue current medications and follow-up as planned or sooner if any problems

## 2022-04-12 ENCOUNTER — TELEPHONE (OUTPATIENT)
Dept: FAMILY MEDICINE CLINIC | Facility: CLINIC | Age: 82
End: 2022-04-12

## 2022-04-12 DIAGNOSIS — I50.22 CHRONIC SYSTOLIC (CONGESTIVE) HEART FAILURE: Primary | ICD-10-CM

## 2022-04-13 NOTE — TELEPHONE ENCOUNTER
Please let patient know that she has significant heart failure, ejection fraction is very low.  This means that it is not pumping well.  Patient needs to be seen by cardiology - referral placed.  She needs additional evaluation.  Thanks, COLETTE Aguilar

## 2022-04-13 NOTE — TELEPHONE ENCOUNTER
Ms. Worley has called back and she would like the referral sent to Dr Zavala.     Thank you  GERONIMO Colin

## 2022-04-13 NOTE — TELEPHONE ENCOUNTER
Per Dr. Aguilar,  and her niece Remi Worley have been called with recent ECHO results and recommendations.   Continue current medications and follow-up as planned or sooner if any problems.    Dr. Aguilar  Ms Worley said that they have called and have her scheduled with Dr Weller on June 3rd.   She said she knows Dr. Zavala and will call and see if he can see her sooner.   She said she will let us know if we need a referral to .

## 2022-04-21 ENCOUNTER — TELEPHONE (OUTPATIENT)
Dept: FAMILY MEDICINE CLINIC | Facility: CLINIC | Age: 82
End: 2022-04-21

## 2022-04-21 NOTE — TELEPHONE ENCOUNTER
Per Dr. Aguilar, Ms. Lenz has been called with recent Screening Mammogram results & recommendations.  Continue current medications and follow-up as planned or sooner if any problems.       ----- Message from Lo Aguilar MD sent at 4/21/2022 11:41 AM CDT -----  Please call and let patient know that mammogram is normal.  Plan to repeat in 1 year.  Thanks, COLETTE Aguilar

## 2022-05-03 ENCOUNTER — PATIENT MESSAGE (OUTPATIENT)
Dept: FAMILY MEDICINE CLINIC | Facility: CLINIC | Age: 82
End: 2022-05-03

## 2022-05-03 RX ORDER — ERGOCALCIFEROL 1.25 MG/1
CAPSULE ORAL
Qty: 5 CAPSULE | Refills: 0 | Status: SHIPPED | OUTPATIENT
Start: 2022-05-03 | End: 2022-07-18 | Stop reason: SDUPTHER

## 2022-05-03 RX ORDER — CARVEDILOL 3.12 MG/1
3.12 TABLET ORAL 2 TIMES DAILY WITH MEALS
Qty: 180 TABLET | Refills: 3 | Status: SHIPPED | OUTPATIENT
Start: 2022-05-03 | End: 2022-07-18

## 2022-05-03 NOTE — TELEPHONE ENCOUNTER
From: Corina Lenz  To: Lo Aguilar MD  Sent: 5/3/2022 2:38 PM CDT  Subject: Need some Medicine     I'm out of vitamin D and out of cardivol

## 2022-07-18 ENCOUNTER — OFFICE VISIT (OUTPATIENT)
Dept: FAMILY MEDICINE CLINIC | Facility: CLINIC | Age: 82
End: 2022-07-18

## 2022-07-18 VITALS
HEIGHT: 64 IN | SYSTOLIC BLOOD PRESSURE: 120 MMHG | WEIGHT: 132 LBS | BODY MASS INDEX: 22.53 KG/M2 | HEART RATE: 71 BPM | DIASTOLIC BLOOD PRESSURE: 81 MMHG | OXYGEN SATURATION: 97 %

## 2022-07-18 DIAGNOSIS — Z00.00 MEDICARE ANNUAL WELLNESS VISIT, SUBSEQUENT: Primary | ICD-10-CM

## 2022-07-18 DIAGNOSIS — I50.22 CHRONIC SYSTOLIC (CONGESTIVE) HEART FAILURE: ICD-10-CM

## 2022-07-18 DIAGNOSIS — E55.9 VITAMIN D DEFICIENCY: ICD-10-CM

## 2022-07-18 PROCEDURE — 1160F RVW MEDS BY RX/DR IN RCRD: CPT | Performed by: FAMILY MEDICINE

## 2022-07-18 PROCEDURE — 1170F FXNL STATUS ASSESSED: CPT | Performed by: FAMILY MEDICINE

## 2022-07-18 PROCEDURE — G0439 PPPS, SUBSEQ VISIT: HCPCS | Performed by: FAMILY MEDICINE

## 2022-07-18 RX ORDER — FUROSEMIDE 20 MG/1
20 TABLET ORAL EVERY MORNING
Qty: 30 TABLET | Refills: 5 | Status: SHIPPED | OUTPATIENT
Start: 2022-07-18 | End: 2022-11-21 | Stop reason: SDUPTHER

## 2022-07-18 RX ORDER — POTASSIUM CHLORIDE 750 MG/1
20 CAPSULE, EXTENDED RELEASE ORAL DAILY
Qty: 60 CAPSULE | Refills: 5 | Status: SHIPPED | OUTPATIENT
Start: 2022-07-18 | End: 2022-12-05

## 2022-07-18 RX ORDER — ERGOCALCIFEROL 1.25 MG/1
CAPSULE ORAL
Qty: 4 CAPSULE | Refills: 2 | Status: SHIPPED | OUTPATIENT
Start: 2022-07-18 | End: 2022-07-21

## 2022-07-18 NOTE — PROGRESS NOTES
The ABCs of the Annual Wellness Visit  Subsequent Medicare Wellness Visit    Chief Complaint   Patient presents with   • Medicare Wellness-subsequent     Subjective   History of Present Illness:  Corina Lenz is a 82 y.o. female who presents for an Initial Medicare Wellness Visit.    The following portions of the patient's history were reviewed and   updated as appropriate: allergies, current medications, past family history, past medical history, past social history, past surgical history and problem list.     Compared to one year ago, the patient feels her physical   health is better.    Compared to one year ago, the patient feels her mental   health is better.    Recent Hospitalizations:  This patient has had a [facility] admission record on file within the last 365 days.    Current Medical Providers:  Patient Care Team:  Lo Aguilar MD as PCP - General (Family Medicine)  Lawrence Zavala MD as Consulting Physician (Cardiology)    Outpatient Medications Prior to Visit   Medication Sig Dispense Refill   • acetaminophen (TYLENOL) 325 MG tablet Take 2 tablets by mouth Every 4 (Four) Hours As Needed for Mild Pain .     • simvastatin (ZOCOR) 20 MG tablet Take 20 mg by mouth Every Night.     • furosemide (LASIX) 20 MG tablet Take 1 tablet by mouth Every Morning.     • KLOR-CON 20 MEQ CR tablet      • potassium chloride (MICRO-K) 10 MEQ CR capsule Take 20 mEq by mouth Daily.     • vitamin D (ERGOCALCIFEROL) 1.25 MG (08075 UT) capsule capsule TAKE 1 CAPSULE BY MOUTH ONCE A MONTH. 5 capsule 0   • carvedilol (COREG) 3.125 MG tablet Take 1 tablet by mouth 2 (Two) Times a Day With Meals. 180 tablet 3   • metoprolol tartrate (LOPRESSOR) 25 MG tablet Take 12.5 mg by mouth 2 (Two) Times a Day.       No facility-administered medications prior to visit.       No opioid medication identified on active medication list. I have reviewed chart for other potential  high risk medication/s and harmful drug interactions in the  "elderly.          Aspirin is not on active medication list.  Aspirin use is not indicated based on review of current medical condition/s. Risk of harm outweighs potential benefits.  .    Patient Active Problem List   Diagnosis   • Displaced fracture of left femoral neck (HCC)   • Primary hypertension   • Closed displaced fracture of base of neck of left femur (HCC)   • Closed displaced intertrochanteric fracture of left femur (HCC)     Advance Care Planning  Advance Directive is not on file.  ACP discussion was held with the patient during this visit. Patient does not have an advance directive, information provided.          Objective       Vitals:    07/18/22 1010   BP: 120/81   Pulse: 71   SpO2: 97%   Weight: 59.9 kg (132 lb)   Height: 162.6 cm (64.02\")   PainSc: 0-No pain     Estimated body mass index is 22.64 kg/m² as calculated from the following:    Height as of this encounter: 162.6 cm (64.02\").    Weight as of this encounter: 59.9 kg (132 lb).    BMI is within normal parameters. No other follow-up for BMI required.      Does the patient have evidence of cognitive impairment? No    Physical Exam  Vitals reviewed.   Constitutional:       General: She is not in acute distress.     Appearance: She is well-developed.   Cardiovascular:      Rate and Rhythm: Normal rate and regular rhythm.      Heart sounds: Normal heart sounds. No murmur heard.  Pulmonary:      Effort: Pulmonary effort is normal. No respiratory distress.      Breath sounds: Normal breath sounds. No wheezing or rales.   Skin:     General: Skin is warm and dry.   Neurological:      Mental Status: She is alert and oriented to person, place, and time.               HEALTH RISK ASSESSMENT    Smoking Status:  Social History     Tobacco Use   Smoking Status Never Smoker   Smokeless Tobacco Never Used     Alcohol Consumption:  Social History     Substance and Sexual Activity   Alcohol Use Never     Fall Risk Screen:    STEADI Fall Risk Assessment was " completed, and patient is at HIGH risk for falls. Assessment completed on:7/18/2022    Depression Screen:   PHQ-2/PHQ-9 Depression Screening 7/18/2022   Little Interest or Pleasure in Doing Things 0-->not at all   Feeling Down, Depressed or Hopeless 0-->not at all   PHQ-9: Brief Depression Severity Measure Score 0       Health Habits and Functional and Cognitive Screening:  Functional & Cognitive Status 7/18/2022   Do you have difficulty preparing food and eating? No   Do you have difficulty bathing yourself, getting dressed or grooming yourself? No   Do you have difficulty using the toilet? No   Do you have difficulty moving around from place to place? No   Do you have trouble with steps or getting out of a bed or a chair? No   Current Diet Well Balanced Diet   Dental Exam Not up to date   Eye Exam Up to date   Exercise (times per week) 5 times per week   Current Exercises Include Walking   Do you need help using the phone?  No   Are you deaf or do you have serious difficulty hearing?  No   Do you need help with transportation? No   Do you need help shopping? No   Do you need help preparing meals?  No   Do you need help with housework?  Yes   Do you need help with laundry? No   Do you need help taking your medications? No   Do you need help managing money? No   Do you ever drive or ride in a car without wearing a seat belt? No   Have you felt unusual stress, anger or loneliness in the last month? No   Who do you live with? Other   If you need help, do you have trouble finding someone available to you? No   Have you been bothered in the last four weeks by sexual problems? No   Do you have difficulty concentrating, remembering or making decisions? No       Age-appropriate Screening Schedule:  Refer to the list below for future screening recommendations based on patient's age, sex and/or medical conditions. Orders for these recommended tests are listed in the plan section. The patient has been provided with a written  plan.    Health Maintenance   Topic Date Due   • TDAP/TD VACCINES (1 - Tdap) Never done   • ZOSTER VACCINE (1 of 2) Never done   • INFLUENZA VACCINE  10/01/2022   • LIPID PANEL  03/16/2023   • DXA SCAN  04/19/2024            Assessment & Plan   CMS Preventative Services Quick Reference  Risk Factors Identified During Encounter  Cardiovascular Disease  Fall Risk-High or Moderate  The above risks/problems have been discussed with the patient.  Follow up actions/plans if indicated are seen below in the Assessment/Plan Section.  Pertinent information has been shared with the patient in the After Visit Summary.    Diagnoses and all orders for this visit:    1. Medicare annual wellness visit, subsequent (Primary)    2. Chronic systolic (congestive) heart failure (HCC)  -     potassium chloride (MICRO-K) 10 MEQ CR capsule; Take 2 capsules by mouth Daily.  Dispense: 60 capsule; Refill: 5  -     furosemide (LASIX) 20 MG tablet; Take 1 tablet by mouth Every Morning.  Dispense: 30 tablet; Refill: 5  -     metoprolol tartrate (LOPRESSOR) 25 MG tablet; Take 0.5 tablets by mouth 2 (Two) Times a Day.  Dispense: 30 tablet; Refill: 5    3. Vitamin D deficiency  -     vitamin D (ERGOCALCIFEROL) 1.25 MG (75303 UT) capsule capsule; TAKE 1 CAPSULE BY MOUTH ONCE A MONTH.  Dispense: 4 capsule; Refill: 2      Medicare wellness visit completed  No acute concerns   Needed medication refills provided  Vitamin D supplement sent.      Follow Up:  Return in about 4 months (around 11/18/2022) for Recheck.     An After Visit Summary and PPPS were made available to the patient.                   This document has been electronically signed by Lo Aguilar MD

## 2022-07-21 DIAGNOSIS — E55.9 VITAMIN D DEFICIENCY: ICD-10-CM

## 2022-07-21 RX ORDER — ERGOCALCIFEROL 1.25 MG/1
CAPSULE ORAL
Qty: 3 CAPSULE | Refills: 1 | Status: SHIPPED | OUTPATIENT
Start: 2022-07-21

## 2022-11-21 ENCOUNTER — OFFICE VISIT (OUTPATIENT)
Dept: FAMILY MEDICINE CLINIC | Facility: CLINIC | Age: 82
End: 2022-11-21

## 2022-11-21 ENCOUNTER — LAB (OUTPATIENT)
Dept: LAB | Facility: HOSPITAL | Age: 82
End: 2022-11-21

## 2022-11-21 VITALS
SYSTOLIC BLOOD PRESSURE: 132 MMHG | WEIGHT: 133 LBS | BODY MASS INDEX: 22.71 KG/M2 | HEIGHT: 64 IN | HEART RATE: 84 BPM | OXYGEN SATURATION: 97 % | DIASTOLIC BLOOD PRESSURE: 76 MMHG

## 2022-11-21 DIAGNOSIS — I50.22 CHRONIC SYSTOLIC (CONGESTIVE) HEART FAILURE: ICD-10-CM

## 2022-11-21 DIAGNOSIS — I50.22 CHRONIC SYSTOLIC (CONGESTIVE) HEART FAILURE: Primary | ICD-10-CM

## 2022-11-21 DIAGNOSIS — E78.5 HYPERLIPIDEMIA, UNSPECIFIED HYPERLIPIDEMIA TYPE: ICD-10-CM

## 2022-11-21 LAB
ALBUMIN SERPL-MCNC: 3.9 G/DL (ref 3.5–5.2)
ALBUMIN/GLOB SERPL: 1.3 G/DL
ALP SERPL-CCNC: 36 U/L (ref 39–117)
ALT SERPL W P-5'-P-CCNC: 15 U/L (ref 1–33)
ANION GAP SERPL CALCULATED.3IONS-SCNC: 11.2 MMOL/L (ref 5–15)
AST SERPL-CCNC: 19 U/L (ref 1–32)
BILIRUB SERPL-MCNC: 0.5 MG/DL (ref 0–1.2)
BUN SERPL-MCNC: 8 MG/DL (ref 8–23)
BUN/CREAT SERPL: 10.8 (ref 7–25)
CALCIUM SPEC-SCNC: 9.1 MG/DL (ref 8.6–10.5)
CHLORIDE SERPL-SCNC: 103 MMOL/L (ref 98–107)
CO2 SERPL-SCNC: 26.8 MMOL/L (ref 22–29)
CREAT SERPL-MCNC: 0.74 MG/DL (ref 0.57–1)
EGFRCR SERPLBLD CKD-EPI 2021: 81.9 ML/MIN/1.73
GLOBULIN UR ELPH-MCNC: 3.1 GM/DL
GLUCOSE SERPL-MCNC: 85 MG/DL (ref 65–99)
POTASSIUM SERPL-SCNC: 3.6 MMOL/L (ref 3.5–5.2)
PROT SERPL-MCNC: 7 G/DL (ref 6–8.5)
SODIUM SERPL-SCNC: 141 MMOL/L (ref 136–145)

## 2022-11-21 PROCEDURE — 80053 COMPREHEN METABOLIC PANEL: CPT

## 2022-11-21 PROCEDURE — 36415 COLL VENOUS BLD VENIPUNCTURE: CPT

## 2022-11-21 PROCEDURE — 99213 OFFICE O/P EST LOW 20 MIN: CPT | Performed by: FAMILY MEDICINE

## 2022-11-21 RX ORDER — FUROSEMIDE 20 MG/1
20 TABLET ORAL EVERY MORNING
Qty: 90 TABLET | Refills: 3 | Status: SHIPPED | OUTPATIENT
Start: 2022-11-21

## 2022-11-21 RX ORDER — SIMVASTATIN 20 MG
20 TABLET ORAL NIGHTLY
Qty: 90 TABLET | Refills: 3 | Status: SHIPPED | OUTPATIENT
Start: 2022-11-21 | End: 2023-03-24

## 2022-11-21 RX ORDER — POTASSIUM CHLORIDE 1500 MG/1
20 TABLET, EXTENDED RELEASE ORAL DAILY
COMMUNITY
Start: 2022-09-30 | End: 2022-12-16 | Stop reason: SDUPTHER

## 2022-12-04 ENCOUNTER — TELEPHONE (OUTPATIENT)
Dept: FAMILY MEDICINE CLINIC | Facility: CLINIC | Age: 82
End: 2022-12-04

## 2022-12-04 NOTE — TELEPHONE ENCOUNTER
Would you please contact patient and make sure she has follow up with cardiology (who and when)?  Thanks, COLETTE Aguilar

## 2022-12-05 RX ORDER — POTASSIUM CHLORIDE 1.5 G/1.77G
20 POWDER, FOR SOLUTION ORAL DAILY
Qty: 30 PACKET | Refills: 5 | Status: SHIPPED | OUTPATIENT
Start: 2022-12-05 | End: 2022-12-16

## 2022-12-05 NOTE — TELEPHONE ENCOUNTER
She has an appointment with Dr. Zavala sometime in April of 2023  Patient saw Dr Zavala in November, before she saw you on 11/21/2022    The daughter said you were going to send her in a liquid potassium to mix with water.   She uses [facility]

## 2022-12-06 NOTE — TELEPHONE ENCOUNTER
Patient's daughter has been called and advised that the requested Potassium medication has been sent in.

## 2022-12-15 ENCOUNTER — TELEPHONE (OUTPATIENT)
Dept: FAMILY MEDICINE CLINIC | Facility: CLINIC | Age: 82
End: 2022-12-15

## 2022-12-15 NOTE — TELEPHONE ENCOUNTER
Pt's daughter called and said that the potassium chloride (KLOR-CON) 20 MEQ packet that you ordered for her is too expensive and wanted to see if you could call in Klor-Con 20 mg tablets again. They talked to the Pharmacist and he said that it was ok to crush them. Send to [facility]

## 2022-12-16 RX ORDER — POTASSIUM CHLORIDE 1500 MG/1
20 TABLET, EXTENDED RELEASE ORAL DAILY
Qty: 90 TABLET | Refills: 3 | Status: SHIPPED | OUTPATIENT
Start: 2022-12-16

## 2023-03-21 ENCOUNTER — OFFICE VISIT (OUTPATIENT)
Dept: FAMILY MEDICINE CLINIC | Facility: CLINIC | Age: 83
End: 2023-03-21
Payer: MEDICARE

## 2023-03-21 ENCOUNTER — LAB (OUTPATIENT)
Dept: LAB | Facility: HOSPITAL | Age: 83
End: 2023-03-21
Payer: MEDICARE

## 2023-03-21 VITALS
DIASTOLIC BLOOD PRESSURE: 80 MMHG | OXYGEN SATURATION: 98 % | BODY MASS INDEX: 21.85 KG/M2 | HEIGHT: 64 IN | WEIGHT: 128 LBS | HEART RATE: 68 BPM | SYSTOLIC BLOOD PRESSURE: 120 MMHG

## 2023-03-21 DIAGNOSIS — E78.5 HYPERLIPIDEMIA, UNSPECIFIED HYPERLIPIDEMIA TYPE: ICD-10-CM

## 2023-03-21 DIAGNOSIS — I50.22 CHRONIC SYSTOLIC (CONGESTIVE) HEART FAILURE: ICD-10-CM

## 2023-03-21 DIAGNOSIS — I50.22 CHRONIC SYSTOLIC (CONGESTIVE) HEART FAILURE: Primary | ICD-10-CM

## 2023-03-21 LAB
ALBUMIN SERPL-MCNC: 4.4 G/DL (ref 3.5–5.2)
ALBUMIN/GLOB SERPL: 1.2 G/DL
ALP SERPL-CCNC: 42 U/L (ref 39–117)
ALT SERPL W P-5'-P-CCNC: 14 U/L (ref 1–33)
ANION GAP SERPL CALCULATED.3IONS-SCNC: 9 MMOL/L (ref 5–15)
AST SERPL-CCNC: 25 U/L (ref 1–32)
BASOPHILS # BLD AUTO: 0.02 10*3/MM3 (ref 0–0.2)
BASOPHILS NFR BLD AUTO: 0.4 % (ref 0–1.5)
BILIRUB SERPL-MCNC: 0.7 MG/DL (ref 0–1.2)
BUN SERPL-MCNC: 14 MG/DL (ref 8–23)
BUN/CREAT SERPL: 16.3 (ref 7–25)
CALCIUM SPEC-SCNC: 9.4 MG/DL (ref 8.6–10.5)
CHLORIDE SERPL-SCNC: 102 MMOL/L (ref 98–107)
CHOLEST SERPL-MCNC: 261 MG/DL (ref 0–200)
CO2 SERPL-SCNC: 28 MMOL/L (ref 22–29)
CREAT SERPL-MCNC: 0.86 MG/DL (ref 0.57–1)
DEPRECATED RDW RBC AUTO: 42.1 FL (ref 37–54)
EGFRCR SERPLBLD CKD-EPI 2021: 68 ML/MIN/1.73
EOSINOPHIL # BLD AUTO: 0.14 10*3/MM3 (ref 0–0.4)
EOSINOPHIL NFR BLD AUTO: 2.6 % (ref 0.3–6.2)
ERYTHROCYTE [DISTWIDTH] IN BLOOD BY AUTOMATED COUNT: 13.4 % (ref 12.3–15.4)
GLOBULIN UR ELPH-MCNC: 3.8 GM/DL
GLUCOSE SERPL-MCNC: 83 MG/DL (ref 65–99)
HBA1C MFR BLD: 5.3 % (ref 4.8–5.6)
HCT VFR BLD AUTO: 41.8 % (ref 34–46.6)
HDLC SERPL-MCNC: 53 MG/DL (ref 40–60)
HGB BLD-MCNC: 14 G/DL (ref 12–15.9)
IMM GRANULOCYTES # BLD AUTO: 0.02 10*3/MM3 (ref 0–0.05)
IMM GRANULOCYTES NFR BLD AUTO: 0.4 % (ref 0–0.5)
LDLC SERPL CALC-MCNC: 188 MG/DL (ref 0–100)
LDLC/HDLC SERPL: 3.49 {RATIO}
LYMPHOCYTES # BLD AUTO: 2.07 10*3/MM3 (ref 0.7–3.1)
LYMPHOCYTES NFR BLD AUTO: 37.7 % (ref 19.6–45.3)
MCH RBC QN AUTO: 29.2 PG (ref 26.6–33)
MCHC RBC AUTO-ENTMCNC: 33.5 G/DL (ref 31.5–35.7)
MCV RBC AUTO: 87.1 FL (ref 79–97)
MONOCYTES # BLD AUTO: 0.44 10*3/MM3 (ref 0.1–0.9)
MONOCYTES NFR BLD AUTO: 8 % (ref 5–12)
NEUTROPHILS NFR BLD AUTO: 2.8 10*3/MM3 (ref 1.7–7)
NEUTROPHILS NFR BLD AUTO: 50.9 % (ref 42.7–76)
NRBC BLD AUTO-RTO: 0 /100 WBC (ref 0–0.2)
PLATELET # BLD AUTO: 171 10*3/MM3 (ref 140–450)
PMV BLD AUTO: 11 FL (ref 6–12)
POTASSIUM SERPL-SCNC: 4.1 MMOL/L (ref 3.5–5.2)
PROT SERPL-MCNC: 8.2 G/DL (ref 6–8.5)
RBC # BLD AUTO: 4.8 10*6/MM3 (ref 3.77–5.28)
SODIUM SERPL-SCNC: 139 MMOL/L (ref 136–145)
TRIGL SERPL-MCNC: 114 MG/DL (ref 0–150)
VLDLC SERPL-MCNC: 20 MG/DL (ref 5–40)
WBC NRBC COR # BLD: 5.49 10*3/MM3 (ref 3.4–10.8)

## 2023-03-21 PROCEDURE — 1160F RVW MEDS BY RX/DR IN RCRD: CPT | Performed by: FAMILY MEDICINE

## 2023-03-21 PROCEDURE — 85025 COMPLETE CBC W/AUTO DIFF WBC: CPT

## 2023-03-21 PROCEDURE — 80053 COMPREHEN METABOLIC PANEL: CPT

## 2023-03-21 PROCEDURE — 3074F SYST BP LT 130 MM HG: CPT | Performed by: FAMILY MEDICINE

## 2023-03-21 PROCEDURE — 83036 HEMOGLOBIN GLYCOSYLATED A1C: CPT

## 2023-03-21 PROCEDURE — 80061 LIPID PANEL: CPT

## 2023-03-21 PROCEDURE — 99213 OFFICE O/P EST LOW 20 MIN: CPT | Performed by: FAMILY MEDICINE

## 2023-03-21 PROCEDURE — 3079F DIAST BP 80-89 MM HG: CPT | Performed by: FAMILY MEDICINE

## 2023-03-21 PROCEDURE — 1159F MED LIST DOCD IN RCRD: CPT | Performed by: FAMILY MEDICINE

## 2023-03-21 PROCEDURE — 36415 COLL VENOUS BLD VENIPUNCTURE: CPT

## 2023-03-21 NOTE — PROGRESS NOTES
"Chief Complaint  Congestive Heart Failure    Subjective    History of Present Illness {CC  Problem List  Visit  Diagnosis   Encounters  Notes  Medications  Labs  Result Review Imaging  Media :23}     Corina Lenz presents to [facility] [place] for     Chief Complaint   Patient presents with   • Congestive Heart Failure      Patient seen today for follow up.  Has chronic medical problems including hypertension, hyperlipidemia and heart failure.  Notes that she has been doing well since last visit.  Has been adherent to medication regimen.  No significant weight changes.  Using lasix/potassium.  Has upcoming appt with cardiology.  Hyperlipidemia, not taking simvastatin.      Wt Readings from Last 3 Encounters:   03/21/23 58.1 kg (128 lb)   11/21/22 60.3 kg (133 lb)   07/18/22 59.9 kg (132 lb)          Current Outpatient Medications:   •  acetaminophen (TYLENOL) 325 MG tablet, Take 2 tablets by mouth Every 4 (Four) Hours As Needed for Mild Pain ., Disp: , Rfl:   •  furosemide (LASIX) 20 MG tablet, Take 1 tablet by mouth Every Morning., Disp: 90 tablet, Rfl: 3  •  KLOR-CON 20 MEQ CR tablet, Take 1 tablet by mouth Daily., Disp: 90 tablet, Rfl: 3  •  metoprolol tartrate (LOPRESSOR) 25 MG tablet, Take 0.5 tablets by mouth 2 (Two) Times a Day., Disp: 90 tablet, Rfl: 3  •  vitamin D (ERGOCALCIFEROL) 1.25 MG (92068 UT) capsule capsule, TAKE 1 CAPSULE BY MOUTH ONCE A MONTH, Disp: 3 capsule, Rfl: 1  •  simvastatin (ZOCOR) 20 MG tablet, Take 1 tablet by mouth Every Night. (Patient not taking: Reported on 3/21/2023), Disp: 90 tablet, Rfl: 3     Objective       Vital Signs:   /80   Pulse 68   Ht 162.6 cm (64\")   Wt 58.1 kg (128 lb)   SpO2 98%   BMI 21.97 kg/m²     Physical Exam  Vitals reviewed.   Constitutional:       General: She is not in acute distress.     Appearance: She is well-developed.   Cardiovascular:      Rate and Rhythm: Normal rate and regular " rhythm.      Heart sounds: Normal heart sounds.   Pulmonary:      Effort: Pulmonary effort is normal. No respiratory distress.      Breath sounds: Normal breath sounds. No wheezing or rales.   Musculoskeletal:      Right lower leg: No edema.      Left lower leg: No edema.   Skin:     General: Skin is warm and dry.   Neurological:      Mental Status: She is alert and oriented to person, place, and time.        Result Review :{ Labs  Result Review  Imaging  Med Tab  Media :23}   The following data was reviewed by: Lo Aguilar MD on 03/21/2023    Common labs    Common Labs 11/21/22   Glucose 85   BUN 8   Creatinine 0.74   Sodium 141   Potassium 3.6   Chloride 103   Calcium 9.1   Albumin 3.90   Total Bilirubin 0.5   Alkaline Phosphatase 36 (A)   AST (SGOT) 19   ALT (SGPT) 15   (A) Abnormal value                    Assessment and Plan {CC Problem List  Visit Diagnosis  ROS  Review (Popup)  Health Maintenance  Quality  BestPractice  Medications  SmartSets  SnapShot Encounters  Media :23}   Diagnoses and all orders for this visit:    1. Chronic systolic (congestive) heart failure (Primary)  -     Lipid Panel; Future  -     CBC & Differential; Future  -     Comprehensive Metabolic Panel; Future  -     Hemoglobin A1c; Future    2. Hyperlipidemia, unspecified hyperlipidemia type  -     Lipid Panel; Future       Patient seen for follow up  Heart failure, euvolemic on exam  Check labs as above  Continue current medication and cardiology follow up  Check lipid panel  Will discuss results/different statin use following if needed      Follow Up {Instructions Charge Capture  Follow-up Communications :23}   Return in about 4 months (around 7/21/2023) for Recheck.  Patient was given instructions and counseling regarding her condition or for health maintenance advice. Please see specific information pulled into the AVS if appropriate.            This document has been electronically signed by Lo CASTELLON  MD Jeff

## 2023-03-24 ENCOUNTER — TELEPHONE (OUTPATIENT)
Dept: FAMILY MEDICINE CLINIC | Facility: CLINIC | Age: 83
End: 2023-03-24
Payer: MEDICARE

## 2023-03-24 DIAGNOSIS — E78.5 HYPERLIPIDEMIA, UNSPECIFIED HYPERLIPIDEMIA TYPE: Primary | ICD-10-CM

## 2023-03-24 RX ORDER — ROSUVASTATIN CALCIUM 5 MG/1
5 TABLET, COATED ORAL DAILY
Qty: 90 TABLET | Refills: 1 | Status: SHIPPED | OUTPATIENT
Start: 2023-03-24

## 2023-03-24 NOTE — PROGRESS NOTES
Per Dr. Aguilar, Ms. Lenz family has been called with recent lab results & recommendations.  Continue current medications and follow-up as planned or sooner if any problems.

## 2023-03-24 NOTE — TELEPHONE ENCOUNTER
Per Dr. Aguilar, Ms. Lenz family has been called with recent lab results & recommendations.  Continue current medications and follow-up as planned or sooner if any problems.       ----- Message from Lo Aguilar MD sent at 3/24/2023  2:01 PM CDT -----  LDL cholesterol too high.  I would like to try Crestor 5 mg daily, if causes problems again can stop.  HgbA1c is stable and normal at 5.3%.  Thanks, COLETTE Aguilar

## 2023-08-18 ENCOUNTER — OFFICE VISIT (OUTPATIENT)
Dept: FAMILY MEDICINE CLINIC | Facility: CLINIC | Age: 83
End: 2023-08-18
Payer: MEDICARE

## 2023-08-18 VITALS
BODY MASS INDEX: 20.66 KG/M2 | HEART RATE: 75 BPM | HEIGHT: 64 IN | OXYGEN SATURATION: 97 % | DIASTOLIC BLOOD PRESSURE: 66 MMHG | WEIGHT: 121 LBS | SYSTOLIC BLOOD PRESSURE: 130 MMHG

## 2023-08-18 DIAGNOSIS — R73.09 ELEVATED GLUCOSE: ICD-10-CM

## 2023-08-18 DIAGNOSIS — I50.22 CHRONIC SYSTOLIC (CONGESTIVE) HEART FAILURE: ICD-10-CM

## 2023-08-18 DIAGNOSIS — E55.9 VITAMIN D DEFICIENCY: ICD-10-CM

## 2023-08-18 DIAGNOSIS — Z00.00 MEDICARE ANNUAL WELLNESS VISIT, SUBSEQUENT: Primary | ICD-10-CM

## 2023-08-18 PROCEDURE — 3075F SYST BP GE 130 - 139MM HG: CPT | Performed by: FAMILY MEDICINE

## 2023-08-18 PROCEDURE — 3078F DIAST BP <80 MM HG: CPT | Performed by: FAMILY MEDICINE

## 2023-08-18 PROCEDURE — 1160F RVW MEDS BY RX/DR IN RCRD: CPT | Performed by: FAMILY MEDICINE

## 2023-08-18 PROCEDURE — 1159F MED LIST DOCD IN RCRD: CPT | Performed by: FAMILY MEDICINE

## 2023-08-18 PROCEDURE — G0439 PPPS, SUBSEQ VISIT: HCPCS | Performed by: FAMILY MEDICINE

## 2023-08-18 RX ORDER — LANOLIN ALCOHOL/MO/W.PET/CERES
1000 CREAM (GRAM) TOPICAL DAILY
COMMUNITY

## 2023-08-18 NOTE — PROGRESS NOTES
The ABCs of the Annual Wellness Visit  Subsequent Medicare Wellness Visit    Subjective      Corina Lenz is a 83 y.o. female who presents for a Subsequent Medicare Wellness Visit.    The following portions of the patient's history were reviewed and   updated as appropriate: allergies, current medications, past family history, past medical history, past social history, past surgical history, and problem list.    Compared to one year ago, the patient feels her physical   health is better.    Compared to one year ago, the patient feels her mental   health is better.    Recent Hospitalizations:  She was not admitted to the hospital during the last year.       Current Medical Providers:  Patient Care Team:  Lo Aguilar MD as PCP - General (Family Medicine)  Lawrence Zavala MD as Consulting Physician (Cardiology)    Outpatient Medications Prior to Visit   Medication Sig Dispense Refill    acetaminophen (TYLENOL) 325 MG tablet Take 2 tablets by mouth Every 4 (Four) Hours As Needed for Mild Pain .      furosemide (LASIX) 20 MG tablet Take 1 tablet by mouth Every Morning. 90 tablet 3    KLOR-CON 20 MEQ CR tablet Take 1 tablet by mouth Daily. 90 tablet 3    metoprolol tartrate (LOPRESSOR) 25 MG tablet Take 0.5 tablets by mouth 2 (Two) Times a Day. 90 tablet 3    rosuvastatin (Crestor) 5 MG tablet Take 1 tablet by mouth Daily. 90 tablet 1    vitamin B-12 (CYANOCOBALAMIN) 1000 MCG tablet Take 1 tablet by mouth Daily.      vitamin D (ERGOCALCIFEROL) 1.25 MG (79597 UT) capsule capsule TAKE 1 CAPSULE BY MOUTH ONCE A MONTH 3 capsule 1     No facility-administered medications prior to visit.       No opioid medication identified on active medication list. I have reviewed chart for other potential  high risk medication/s and harmful drug interactions in the elderly.        Aspirin is not on active medication list.  Aspirin use is not indicated based on review of current medical condition/s. Risk of harm outweighs potential  "benefits.  .    Patient Active Problem List   Diagnosis    Displaced fracture of left femoral neck    Primary hypertension    Closed displaced fracture of base of neck of left femur    Closed displaced intertrochanteric fracture of left femur     Advance Care Planning   Advance Care Planning     Advance Directive is not on file.  ACP discussion was held with the patient during this visit. Patient does not have an advance directive, information provided.     Objective    Vitals:    23 1335   BP: 130/66   Pulse: 75   SpO2: 97%   Weight: 54.9 kg (121 lb)   Height: 162.6 cm (64\")   PainSc: 0-No pain     Estimated body mass index is 20.77 kg/mý as calculated from the following:    Height as of this encounter: 162.6 cm (64\").    Weight as of this encounter: 54.9 kg (121 lb).    BMI is within normal parameters. No other follow-up for BMI required.      Does the patient have evidence of cognitive impairment?   No            HEALTH RISK ASSESSMENT    Smoking Status:  Social History     Tobacco Use   Smoking Status Never   Smokeless Tobacco Never     Alcohol Consumption:  Social History     Substance and Sexual Activity   Alcohol Use Never     Fall Risk Screen:    [facility] not [facility].    Depression Screenin/18/2022    10:12 AM   PHQ-2/PHQ-9 Depression Screening   Little Interest or Pleasure in Doing Things 0-->not at all   Feeling Down, Depressed or Hopeless 0-->not at all   PHQ-9: Brief Depression Severity Measure Score 0       Health Habits and Functional and Cognitive Screenin/18/2022    10:00 AM   Functional & Cognitive Status   Do you have difficulty preparing food and eating? No   Do you have difficulty bathing yourself, getting dressed or grooming yourself? No   Do you have difficulty using the toilet? No   Do you have difficulty moving around from place to place? No   Do you have trouble with steps or getting out of a bed or a chair? No   Current Diet Well " Balanced Diet   Dental Exam Not up to date   Eye Exam Up to date   Exercise (times per week) 5 times per week   Current Exercises Include Walking   Do you need help using the phone?  No   Are you deaf or do you have serious difficulty hearing?  No   Do you need help to go to places out of walking distance? No   Do you need help shopping? No   Do you need help preparing meals?  No   Do you need help with housework?  Yes   Do you need help with laundry? No   Do you need help taking your medications? No   Do you need help managing money? No   Do you ever drive or ride in a car without wearing a seat belt? No   Have you felt unusual stress, anger or loneliness in the last month? No   Who do you live with? Other   If you need help, do you have trouble finding someone available to you? No   Have you been bothered in the last four weeks by sexual problems? No   Do you have difficulty concentrating, remembering or making decisions? No       Age-appropriate Screening Schedule:  Refer to the list below for future screening recommendations based on patient's age, sex and/or medical conditions. Orders for these recommended tests are listed in the plan section. The patient has been provided with a written plan.    Health Maintenance   Topic Date Due    COVID-19 Vaccine (1) Never done    Pneumococcal Vaccine 65+ (1 - PCV) Never done    TDAP/TD VACCINES (1 - Tdap) Never done    ZOSTER VACCINE (1 of 2) Never done    ANNUAL WELLNESS VISIT  07/18/2023    INFLUENZA VACCINE  10/01/2023    LIPID PANEL  03/21/2024    DXA SCAN  04/19/2024                  CMS Preventative Services Quick Reference  Risk Factors Identified During Encounter:    None Identified  Immunizations Discussed/Encouraged: Prevnar 20 (Pneumococcal 20-valent conjugate), Shingrix, and COVID19    The above risks/problems have been discussed with the patient.  Pertinent information has been shared with the patient in the After Visit Summary.    Diagnoses and all orders  for this visit:    1. Medicare annual wellness visit, subsequent (Primary)    2. Elevated glucose  -     Hemoglobin A1c; Future    3. Chronic systolic (congestive) heart failure  -     CBC & Differential; Future  -     Comprehensive Metabolic Panel; Future    4. Vitamin D deficiency  -     Vitamin D,25-Hydroxy; Future      Medicare wellness exam completed today  Check labs as above, will call with results once avialable    Follow Up:   Return in about 4 months (around 12/18/2023) for Recheck.   Next Medicare Wellness visit to be scheduled in 1 year.      An After Visit Summary and PPPS were made available to the patient.          This document has been electronically signed by Lo Aguilar MD

## 2023-08-18 NOTE — PROGRESS NOTES
"The ABCs of the Annual Wellness Visit  Subsequent Medicare Wellness Visit    Subjective    {Wrapup  Review (Popup)  Advance Care Planning  Labs  CC  Problem List  Visit Diagnosis  Medications  Result Review  Imaging  [facility]  [facility]  SnapShot  Encounters  Notes  Media  Procedures :23}  Corina Lenz is a 83 y.o. female who presents for a Subsequent Medicare Wellness Visit.    The following portions of the patient's history were reviewed and   updated as appropriate: {history reviewed:20406::\"allergies\",\"current medications\",\"past family history\",\"past medical history\",\"past social history\",\"past surgical history\",\"problem list\"}.    Compared to one year ago, the patient feels her physical   health is {better worse same:26827}.    Compared to one year ago, the patient feels her mental   health is {better worse same:10763}.    Recent Hospitalizations:  {Hospital Admission Status in the last 365 days:71547}      Current Medical Providers:  Patient Care Team:  Lo Aguilar MD as PCP - General (Family Medicine)  Lawrence Zavala MD as Consulting Physician (Cardiology)    Outpatient Medications Prior to Visit   Medication Sig Dispense Refill    acetaminophen (TYLENOL) 325 MG tablet Take 2 tablets by mouth Every 4 (Four) Hours As Needed for Mild Pain .      furosemide (LASIX) 20 MG tablet Take 1 tablet by mouth Every Morning. 90 tablet 3    KLOR-CON 20 MEQ CR tablet Take 1 tablet by mouth Daily. 90 tablet 3    metoprolol tartrate (LOPRESSOR) 25 MG tablet Take 0.5 tablets by mouth 2 (Two) Times a Day. 90 tablet 3    rosuvastatin (Crestor) 5 MG tablet Take 1 tablet by mouth Daily. 90 tablet 1    vitamin B-12 (CYANOCOBALAMIN) 1000 MCG tablet Take 1 tablet by mouth Daily.      vitamin D (ERGOCALCIFEROL) 1.25 MG (05517 UT) capsule capsule TAKE 1 CAPSULE BY MOUTH ONCE A MONTH 3 capsule 1     No facility-administered medications prior to visit.       No opioid medication " "identified on active medication list. I have reviewed chart for other potential  high risk medication/s and harmful drug interactions in the elderly.        Aspirin is not on active medication list.  {ASPIRIN NOT ON MEDICATION LIST INDICATED/NOT INDICATED:38793}.    Patient Active Problem List   Diagnosis    Displaced fracture of left femoral neck    Primary hypertension    Closed displaced fracture of base of neck of left femur    Closed displaced intertrochanteric fracture of left femur     Advance Care Planning   Advance Care Planning     Advance Directive is not on file.  {ACP Discussion, Advance Directive not in EMR:47799}     Objective    Vitals:    23 1335   BP: 130/66   Pulse: 75   SpO2: 97%   Weight: 54.9 kg (121 lb)   Height: 162.6 cm (64\")   PainSc: 0-No pain     Estimated body mass index is 20.77 kg/mý as calculated from the following:    Height as of this encounter: 162.6 cm (64\").    Weight as of this encounter: 54.9 kg (121 lb).    BMI is within normal parameters. No other follow-up for BMI required.      Does the patient have evidence of cognitive impairment?   {Yes/No:21836}            HEALTH RISK ASSESSMENT    Smoking Status:  Social History     Tobacco Use   Smoking Status Never   Smokeless Tobacco Never     Alcohol Consumption:  Social History     Substance and Sexual Activity   Alcohol Use Never     Fall Risk Screen:    [facility] Fall Risk Assessment has not been completed.    Depression Screenin/18/2022    10:12 AM   PHQ-2/PHQ-9 Depression Screening   Little Interest or Pleasure in Doing Things 0-->not at all   Feeling Down, Depressed or Hopeless 0-->not at all   PHQ-9: Brief Depression Severity Measure Score 0       Health Habits and Functional and Cognitive Screenin/18/2022    10:00 AM   Functional & Cognitive Status   Do you have difficulty preparing food and eating? No   Do you have difficulty bathing yourself, getting dressed or grooming yourself? No   Do you have " difficulty using the toilet? No   Do you have difficulty moving around from place to place? No   Do you have trouble with steps or getting out of a bed or a chair? No   Current Diet Well Balanced Diet   Dental Exam Not up to date   Eye Exam Up to date   Exercise (times per week) 5 times per week   Current Exercises Include Walking   Do you need help using the phone?  No   Are you deaf or do you have serious difficulty hearing?  No   Do you need help to go to places out of walking distance? No   Do you need help shopping? No   Do you need help preparing meals?  No   Do you need help with housework?  Yes   Do you need help with laundry? No   Do you need help taking your medications? No   Do you need help managing money? No   Do you ever drive or ride in a car without wearing a seat belt? No   Have you felt unusual stress, anger or loneliness in the last month? No   Who do you live with? Other   If you need help, do you have trouble finding someone available to you? No   Have you been bothered in the last four weeks by sexual problems? No   Do you have difficulty concentrating, remembering or making decisions? No       Age-appropriate Screening Schedule:  Refer to the list below for future screening recommendations based on patient's age, sex and/or medical conditions. Orders for these recommended tests are listed in the plan section. The patient has been provided with a written plan.    Health Maintenance   Topic Date Due    COVID-19 Vaccine (1) Never done    Pneumococcal Vaccine 65+ (1 - PCV) Never done    TDAP/TD VACCINES (1 - Tdap) Never done    ZOSTER VACCINE (1 of 2) Never done    ANNUAL WELLNESS VISIT  07/18/2023    INFLUENZA VACCINE  10/01/2023    LIPID PANEL  03/21/2024    DXA SCAN  04/19/2024                  CMS Preventative Services Quick Reference  Risk Factors Identified During Encounter:    {Medicare Wellness Risk Factors:81323}    The above risks/problems have been discussed with the patient.  Pertinent  information has been shared with the patient in the After Visit Summary.    There are no diagnoses linked to this encounter.    Follow Up:   Next Medicare Wellness visit to be scheduled in 1 year.      An After Visit Summary and PPPS were made available to the patient.

## 2023-08-30 ENCOUNTER — LAB (OUTPATIENT)
Dept: LAB | Facility: HOSPITAL | Age: 83
End: 2023-08-30
Payer: MEDICARE

## 2023-08-30 DIAGNOSIS — E55.9 VITAMIN D DEFICIENCY: ICD-10-CM

## 2023-08-30 DIAGNOSIS — I50.22 CHRONIC SYSTOLIC (CONGESTIVE) HEART FAILURE: ICD-10-CM

## 2023-08-30 DIAGNOSIS — R73.09 ELEVATED GLUCOSE: ICD-10-CM

## 2023-08-30 PROCEDURE — 80053 COMPREHEN METABOLIC PANEL: CPT

## 2023-08-30 PROCEDURE — 36415 COLL VENOUS BLD VENIPUNCTURE: CPT

## 2023-08-30 PROCEDURE — 83036 HEMOGLOBIN GLYCOSYLATED A1C: CPT

## 2023-08-30 PROCEDURE — 82306 VITAMIN D 25 HYDROXY: CPT

## 2023-08-30 PROCEDURE — 85025 COMPLETE CBC W/AUTO DIFF WBC: CPT

## 2023-08-31 DIAGNOSIS — E55.9 VITAMIN D DEFICIENCY: ICD-10-CM

## 2023-08-31 LAB
25(OH)D3 SERPL-MCNC: 22.4 NG/ML (ref 30–100)
ALBUMIN SERPL-MCNC: 4.6 G/DL (ref 3.5–5.2)
ALBUMIN/GLOB SERPL: 1.4 G/DL
ALP SERPL-CCNC: 32 U/L (ref 39–117)
ALT SERPL W P-5'-P-CCNC: 13 U/L (ref 1–33)
ANION GAP SERPL CALCULATED.3IONS-SCNC: 12.5 MMOL/L (ref 5–15)
AST SERPL-CCNC: 23 U/L (ref 1–32)
BASOPHILS # BLD AUTO: 0.03 10*3/MM3 (ref 0–0.2)
BASOPHILS NFR BLD AUTO: 0.8 % (ref 0–1.5)
BILIRUB SERPL-MCNC: 0.6 MG/DL (ref 0–1.2)
BUN SERPL-MCNC: 19 MG/DL (ref 8–23)
BUN/CREAT SERPL: 25.3 (ref 7–25)
CALCIUM SPEC-SCNC: 9.7 MG/DL (ref 8.6–10.5)
CHLORIDE SERPL-SCNC: 106 MMOL/L (ref 98–107)
CO2 SERPL-SCNC: 24.5 MMOL/L (ref 22–29)
CREAT SERPL-MCNC: 0.75 MG/DL (ref 0.57–1)
DEPRECATED RDW RBC AUTO: 48.9 FL (ref 37–54)
EGFRCR SERPLBLD CKD-EPI 2021: 80.1 ML/MIN/1.73
EOSINOPHIL # BLD AUTO: 0.18 10*3/MM3 (ref 0–0.4)
EOSINOPHIL NFR BLD AUTO: 4.6 % (ref 0.3–6.2)
ERYTHROCYTE [DISTWIDTH] IN BLOOD BY AUTOMATED COUNT: 14.8 % (ref 12.3–15.4)
GLOBULIN UR ELPH-MCNC: 3.2 GM/DL
GLUCOSE SERPL-MCNC: 87 MG/DL (ref 65–99)
HBA1C MFR BLD: 5.4 % (ref 4.8–5.6)
HCT VFR BLD AUTO: 39.9 % (ref 34–46.6)
HGB BLD-MCNC: 13.2 G/DL (ref 12–15.9)
IMM GRANULOCYTES # BLD AUTO: 0.01 10*3/MM3 (ref 0–0.05)
IMM GRANULOCYTES NFR BLD AUTO: 0.3 % (ref 0–0.5)
LYMPHOCYTES # BLD AUTO: 1.9 10*3/MM3 (ref 0.7–3.1)
LYMPHOCYTES NFR BLD AUTO: 48.8 % (ref 19.6–45.3)
MCH RBC QN AUTO: 29.8 PG (ref 26.6–33)
MCHC RBC AUTO-ENTMCNC: 33.1 G/DL (ref 31.5–35.7)
MCV RBC AUTO: 90.1 FL (ref 79–97)
MONOCYTES # BLD AUTO: 0.33 10*3/MM3 (ref 0.1–0.9)
MONOCYTES NFR BLD AUTO: 8.5 % (ref 5–12)
NEUTROPHILS NFR BLD AUTO: 1.44 10*3/MM3 (ref 1.7–7)
NEUTROPHILS NFR BLD AUTO: 37 % (ref 42.7–76)
NRBC BLD AUTO-RTO: 0 /100 WBC (ref 0–0.2)
PLATELET # BLD AUTO: 156 10*3/MM3 (ref 140–450)
PMV BLD AUTO: 11.1 FL (ref 6–12)
POTASSIUM SERPL-SCNC: 3.8 MMOL/L (ref 3.5–5.2)
PROT SERPL-MCNC: 7.8 G/DL (ref 6–8.5)
RBC # BLD AUTO: 4.43 10*6/MM3 (ref 3.77–5.28)
SODIUM SERPL-SCNC: 143 MMOL/L (ref 136–145)
WBC NRBC COR # BLD: 3.89 10*3/MM3 (ref 3.4–10.8)

## 2023-08-31 RX ORDER — ERGOCALCIFEROL 1.25 MG/1
50000 CAPSULE ORAL
Qty: 4 CAPSULE | Refills: 2 | Status: SHIPPED | OUTPATIENT
Start: 2023-08-31

## 2023-09-01 ENCOUNTER — TELEPHONE (OUTPATIENT)
Dept: FAMILY MEDICINE CLINIC | Facility: CLINIC | Age: 83
End: 2023-09-01
Payer: MEDICARE

## 2023-09-01 NOTE — TELEPHONE ENCOUNTER
Per Dr. Aguilar, Ms. Worley, Ms Lenz daughter has been called with recent lab results & recommendations.  Continue current medications and follow-up as planned or sooner if any problems.     ----- Message from Lo Aguilar MD sent at 8/31/2023  4:22 PM CDT -----  Vitamin D level low, needs to take weekly supplement.  Some mild abnormalities on CBC, will need to recheck with next visit.  Thanks, COLETTE Aguilar

## 2023-09-01 NOTE — PROGRESS NOTES
Per Dr. Aguilar, Ms. Worley, Ms Lenz daughter has been called with recent lab results & recommendations.  Continue current medications and follow-up as planned or sooner if any problems.

## 2023-09-02 NOTE — PATIENT INSTRUCTIONS
Medicare Wellness  Personal Prevention Plan of Service     Date of Office Visit:    Encounter Provider:  Lo Aguilar MD  Place of Service:  [facility]  Patient Name: Corina Lenz  :  1940    As part of the Medicare Wellness portion of your visit today, we are providing you with this personalized preventive plan of services (PPPS). This plan is based upon recommendations of the [place] Preventive Services Task Force (USPSTF) and the Advisory Committee on Immunization Practices (ACIP).    This lists the preventive care services that should be considered, and provides dates of when you are due. Items listed as completed are up-to-date and do not require any further intervention.    Health Maintenance   Topic Date Due    COVID-19 Vaccine (1) Never done    Pneumococcal Vaccine 65+ (1 - PCV) Never done    TDAP/TD VACCINES (1 - Tdap) Never done    ZOSTER VACCINE (1 of 2) Never done    ANNUAL WELLNESS VISIT  2023    INFLUENZA VACCINE  10/01/2023    LIPID PANEL  2024    DXA SCAN  2024       Orders Placed This Encounter   Procedures    Comprehensive Metabolic Panel     Standing Status:   Future     Number of Occurrences:   1     Standing Expiration Date:   2024     Order Specific Question:   Release to patient     Answer:   Routine Release [[id number]]    Hemoglobin A1c     Standing Status:   Future     Number of Occurrences:   1     Standing Expiration Date:   2024     Order Specific Question:   Release to patient     Answer:   Routine Release [[id number]]    Vitamin D,25-Hydroxy     Standing Status:   Future     Number of Occurrences:   1     Standing Expiration Date:   2024     Order Specific Question:   Release to patient     Answer:   Routine Release [[id number]]    CBC & Differential     Standing Status:   Future     Number of Occurrences:   1     Standing Expiration Date:   2024     Order Specific Question:    Manual Differential     Answer:   No     Order Specific Question:   Release to patient     Answer:   Routine Release [[id number]]       Return in about 4 months (around 12/18/2023) for Recheck.

## (undated) DEVICE — 6619 2 PTNT ISO SYS INCISE AREA&LT;(&GT;&&LT;)&GT;P: Brand: STERI-DRAPE™ IOBAN™ 2

## (undated) DEVICE — GLV SURG SENSICARE POLYISPRN W/ALOE PF LF 6.5 GRN STRL

## (undated) DEVICE — SUT VIC 3/0 SH 27IN J416H

## (undated) DEVICE — GLV SURG SIGNATURE ESSENTIAL PF LTX SZ7

## (undated) DEVICE — 6617 IOBAN II PATIENT ISOLATION DRAPE 5/BX,4BX/CS: Brand: STERI-DRAPE™ IOBAN™ 2

## (undated) DEVICE — ANTIBACTERIAL UNDYED BRAIDED (POLYGLACTIN 910), SYNTHETIC ABSORBABLE SUTURE: Brand: COATED VICRYL

## (undated) DEVICE — SOL IRR H2O BG 1000ML STRL

## (undated) DEVICE — PK HIP LF 60

## (undated) DEVICE — CVR SURG EQUIP BND RECTG 36X28

## (undated) DEVICE — STERILE POLYISOPRENE POWDER-FREE SURGICAL GLOVES WITH EMOLLIENT COATING: Brand: PROTEXIS

## (undated) DEVICE — OPTIFOAM GENTLE SA, POSTOP, 4X8: Brand: MEDLINE

## (undated) DEVICE — BIT DRL 3FLUT QC CALIB 4.2X330X100MM

## (undated) DEVICE — PROXIMATE SKIN STAPLERS (35 WIDE) CONTAINS 35 STAINLESS STEEL STAPLES (FIXED HEAD): Brand: PROXIMATE

## (undated) DEVICE — GLV SURG SIGNATURE ESSENTIAL PF LTX SZ6

## (undated) DEVICE — SOL IRR NACL 0.9PCT BT 1000ML

## (undated) DEVICE — PATIENT RETURN ELECTRODE, SINGLE-USE, CONTACT QUALITY MONITORING, ADULT, WITH 9FT CORD, FOR PATIENTS WEIGING OVER 33LBS. (15KG): Brand: MEGADYNE

## (undated) DEVICE — GLV SURG SIGNATURE ESSENTIAL PF LTX SZ7.5

## (undated) DEVICE — COVER,MAYO STAND,STERILE: Brand: MEDLINE

## (undated) DEVICE — SUT VIC 0 CT 36IN J958H

## (undated) DEVICE — MARKR SKIN W/RULR AND LBL

## (undated) DEVICE — CONTAINER,SPECIMEN,OR STERILE,4OZ: Brand: MEDLINE

## (undated) DEVICE — GLV SURG PREMIERPRO GAMMEX NEOPRN PF SZ8 GRN

## (undated) DEVICE — PENCL ES MEGADINE EZ/CLEAN BUTN W/HOLSTR 10FT

## (undated) DEVICE — Device

## (undated) DEVICE — ELECTRD BLD EZ CLN MOD XLNG 2.75IN

## (undated) DEVICE — GW FOR TROCH NAIL 3.2X400MM